# Patient Record
Sex: FEMALE | Race: WHITE | Employment: OTHER | URBAN - METROPOLITAN AREA
[De-identification: names, ages, dates, MRNs, and addresses within clinical notes are randomized per-mention and may not be internally consistent; named-entity substitution may affect disease eponyms.]

---

## 2021-04-29 ENCOUNTER — TRANSCRIBE ORDER (OUTPATIENT)
Dept: SCHEDULING | Age: 76
End: 2021-04-29

## 2021-04-29 DIAGNOSIS — R74.8 ELEVATED LIVER ENZYMES: Primary | ICD-10-CM

## 2021-05-10 ENCOUNTER — HOSPITAL ENCOUNTER (OUTPATIENT)
Dept: ULTRASOUND IMAGING | Age: 76
Discharge: HOME | End: 2021-05-10
Payer: MEDICARE

## 2021-05-10 DIAGNOSIS — R74.8 ELEVATED LIVER ENZYMES: ICD-10-CM

## 2021-05-10 PROCEDURE — 76700 US EXAM ABDOM COMPLETE: CPT

## 2021-06-07 ENCOUNTER — TRANSCRIBE ORDER (OUTPATIENT)
Dept: SCHEDULING | Age: 76
End: 2021-06-07

## 2021-06-07 DIAGNOSIS — R74.8 ACID PHOSPHATASE ELEVATED: Primary | ICD-10-CM

## 2021-06-23 ENCOUNTER — HOSPITAL ENCOUNTER (OUTPATIENT)
Dept: MRI IMAGING | Age: 76
Discharge: HOME | End: 2021-06-23
Payer: MEDICARE

## 2021-06-23 DIAGNOSIS — R74.8 ACID PHOSPHATASE ELEVATED: ICD-10-CM

## 2021-06-23 PROCEDURE — 74181 MRI ABDOMEN W/O CONTRAST: CPT

## 2021-08-16 ENCOUNTER — TRANSCRIBE ORDER (OUTPATIENT)
Dept: SCHEDULING | Age: 76
End: 2021-08-16

## 2021-08-16 DIAGNOSIS — R17 UNSPECIFIED JAUNDICE: ICD-10-CM

## 2021-08-16 DIAGNOSIS — R74.8 ELEVATED LIVER ENZYMES: Primary | ICD-10-CM

## 2021-10-21 ENCOUNTER — TRANSCRIBE ORDER (OUTPATIENT)
Dept: SCHEDULING | Age: 76
End: 2021-10-21

## 2021-10-21 DIAGNOSIS — R74.8 ELEVATED LIVER ENZYMES: Primary | ICD-10-CM

## 2021-10-21 DIAGNOSIS — R17 UNSPECIFIED JAUNDICE: ICD-10-CM

## 2021-11-02 RX ORDER — METFORMIN HYDROCHLORIDE 500 MG/1
500 TABLET ORAL
COMMUNITY
End: 2021-12-10

## 2021-11-02 RX ORDER — CANDESARTAN CILEXETIL AND HYDROCHLOROTHIAZIDE 32; 25 MG/1; MG/1
1 TABLET ORAL DAILY
COMMUNITY
End: 2021-12-10

## 2021-11-02 RX ORDER — METOPROLOL TARTRATE 100 MG/1
100 TABLET ORAL DAILY
COMMUNITY
End: 2021-12-10

## 2021-11-02 RX ORDER — HYDROXYCHLOROQUINE SULFATE 200 MG/1
200 TABLET, FILM COATED ORAL DAILY
COMMUNITY
End: 2022-10-19

## 2021-11-09 ENCOUNTER — HOSPITAL ENCOUNTER (OUTPATIENT)
Dept: INTERVENTIONAL RADIOLOGY/VASCULAR | Age: 76
Discharge: HOME | End: 2021-11-09
Payer: MEDICARE

## 2021-11-09 VITALS
WEIGHT: 130 LBS | BODY MASS INDEX: 25.52 KG/M2 | SYSTOLIC BLOOD PRESSURE: 102 MMHG | OXYGEN SATURATION: 96 % | DIASTOLIC BLOOD PRESSURE: 56 MMHG | TEMPERATURE: 97.8 F | RESPIRATION RATE: 16 BRPM | HEART RATE: 72 BPM | HEIGHT: 60 IN

## 2021-11-09 DIAGNOSIS — R79.89 ELEVATED LIVER FUNCTION TESTS: ICD-10-CM

## 2021-11-09 LAB
ANION GAP SERPL CALC-SCNC: 5 MMOL/L (ref 5–15)
BASOPHILS # BLD: 0.1 K/UL (ref 0–0.1)
BASOPHILS NFR BLD: 1 % (ref 0–1)
BUN SERPL-MCNC: 12 MG/DL (ref 6–20)
BUN/CREAT SERPL: 14 (ref 12–20)
CA-I BLD-MCNC: 8.8 MG/DL (ref 8.5–10.1)
CHLORIDE SERPL-SCNC: 106 MMOL/L (ref 97–108)
CO2 SERPL-SCNC: 25 MMOL/L (ref 21–32)
CREAT SERPL-MCNC: 0.84 MG/DL (ref 0.55–1.02)
DIFFERENTIAL METHOD BLD: ABNORMAL
EOSINOPHIL # BLD: 0.8 K/UL (ref 0–0.4)
EOSINOPHIL NFR BLD: 9 % (ref 0–7)
ERYTHROCYTE [DISTWIDTH] IN BLOOD BY AUTOMATED COUNT: 14.6 % (ref 11.5–14.5)
GLUCOSE BLD STRIP.AUTO-MCNC: 99 MG/DL (ref 65–117)
GLUCOSE SERPL-MCNC: 122 MG/DL (ref 65–100)
HCT VFR BLD AUTO: 37.5 % (ref 35–47)
HGB BLD-MCNC: 12.9 G/DL (ref 11.5–16)
IMM GRANULOCYTES # BLD AUTO: 0 K/UL (ref 0–0.04)
IMM GRANULOCYTES NFR BLD AUTO: 0 % (ref 0–0.5)
INR PPP: 1.4 (ref 0.9–1.1)
LYMPHOCYTES # BLD: 1.8 K/UL (ref 0.8–3.5)
LYMPHOCYTES NFR BLD: 20 % (ref 12–49)
MCH RBC QN AUTO: 32.9 PG (ref 26–34)
MCHC RBC AUTO-ENTMCNC: 34.4 G/DL (ref 30–36.5)
MCV RBC AUTO: 95.7 FL (ref 80–99)
MONOCYTES # BLD: 1.1 K/UL (ref 0–1)
MONOCYTES NFR BLD: 12 % (ref 5–13)
NEUTS SEG # BLD: 5.4 K/UL (ref 1.8–8)
NEUTS SEG NFR BLD: 58 % (ref 32–75)
NRBC # BLD: 0 K/UL (ref 0–0.01)
NRBC BLD-RTO: 0 PER 100 WBC
PERFORMED BY, TECHID: NORMAL
PLATELET # BLD AUTO: 210 K/UL (ref 150–400)
PMV BLD AUTO: 11 FL (ref 8.9–12.9)
POTASSIUM SERPL-SCNC: 3.9 MMOL/L (ref 3.5–5.1)
PROTHROMBIN TIME: 16.6 SEC (ref 11.9–14.7)
RBC # BLD AUTO: 3.92 M/UL (ref 3.8–5.2)
SODIUM SERPL-SCNC: 136 MMOL/L (ref 136–145)
WBC # BLD AUTO: 9.2 K/UL (ref 3.6–11)

## 2021-11-09 PROCEDURE — 88307 TISSUE EXAM BY PATHOLOGIST: CPT

## 2021-11-09 PROCEDURE — 99152 MOD SED SAME PHYS/QHP 5/>YRS: CPT

## 2021-11-09 PROCEDURE — 82962 GLUCOSE BLOOD TEST: CPT

## 2021-11-09 PROCEDURE — 80048 BASIC METABOLIC PNL TOTAL CA: CPT

## 2021-11-09 PROCEDURE — 74011250636 HC RX REV CODE- 250/636: Performed by: RADIOLOGY

## 2021-11-09 PROCEDURE — 36415 COLL VENOUS BLD VENIPUNCTURE: CPT

## 2021-11-09 PROCEDURE — 77030003503 IR BX LIVER PERCUTANEOUS

## 2021-11-09 PROCEDURE — 88333 PATH CONSLTJ SURG CYTO XM 1: CPT

## 2021-11-09 PROCEDURE — 85025 COMPLETE CBC W/AUTO DIFF WBC: CPT

## 2021-11-09 PROCEDURE — 85610 PROTHROMBIN TIME: CPT

## 2021-11-09 PROCEDURE — 88313 SPECIAL STAINS GROUP 2: CPT

## 2021-11-09 RX ORDER — FENTANYL CITRATE 50 UG/ML
12.5-5 INJECTION, SOLUTION INTRAMUSCULAR; INTRAVENOUS
Status: DISCONTINUED | OUTPATIENT
Start: 2021-11-09 | End: 2021-11-18 | Stop reason: HOSPADM

## 2021-11-09 RX ORDER — MIDAZOLAM HYDROCHLORIDE 1 MG/ML
.25-2 INJECTION, SOLUTION INTRAMUSCULAR; INTRAVENOUS
Status: DISCONTINUED | OUTPATIENT
Start: 2021-11-09 | End: 2021-11-18 | Stop reason: HOSPADM

## 2021-11-09 RX ADMIN — FENTANYL CITRATE 50 MCG: 0.05 INJECTION, SOLUTION INTRAMUSCULAR; INTRAVENOUS at 09:49

## 2021-11-09 RX ADMIN — MIDAZOLAM HYDROCHLORIDE 1 MG: 1 INJECTION, SOLUTION INTRAMUSCULAR; INTRAVENOUS at 09:49

## 2021-12-07 ENCOUNTER — HOSPITAL ENCOUNTER (INPATIENT)
Age: 76
LOS: 3 days | Discharge: HOME | DRG: 381 | End: 2021-12-10
Attending: EMERGENCY MEDICINE | Admitting: INTERNAL MEDICINE
Payer: MEDICARE

## 2021-12-07 ENCOUNTER — APPOINTMENT (OUTPATIENT)
Dept: GENERAL RADIOLOGY | Age: 76
DRG: 381 | End: 2021-12-07
Attending: EMERGENCY MEDICINE
Payer: MEDICARE

## 2021-12-07 DIAGNOSIS — K92.0 GASTROINTESTINAL HEMORRHAGE WITH HEMATEMESIS: Primary | ICD-10-CM

## 2021-12-07 PROBLEM — K92.2 GI BLEED: Status: ACTIVE | Noted: 2021-01-01

## 2021-12-07 LAB
ALBUMIN SERPL-MCNC: 1.9 G/DL (ref 3.5–5)
ALBUMIN/GLOB SERPL: 0.3 {RATIO} (ref 1.1–2.2)
ALP SERPL-CCNC: 183 U/L (ref 45–117)
ALT SERPL-CCNC: 345 U/L (ref 12–78)
ANION GAP SERPL CALC-SCNC: 12 MMOL/L (ref 5–15)
APPEARANCE UR: ABNORMAL
AST SERPL W P-5'-P-CCNC: 610 U/L (ref 15–37)
BACTERIA URNS QL MICRO: ABNORMAL /HPF
BASOPHILS # BLD: 0.1 K/UL (ref 0–0.1)
BASOPHILS NFR BLD: 1 % (ref 0–1)
BILIRUB SERPL-MCNC: 3.9 MG/DL (ref 0.2–1)
BILIRUB UR QL: NEGATIVE
BUN SERPL-MCNC: 43 MG/DL (ref 6–20)
BUN/CREAT SERPL: 38 (ref 12–20)
CA-I BLD-MCNC: 9.6 MG/DL (ref 8.5–10.1)
CHLORIDE SERPL-SCNC: 97 MMOL/L (ref 97–108)
CO2 SERPL-SCNC: 20 MMOL/L (ref 21–32)
COLOR UR: YELLOW
CREAT SERPL-MCNC: 1.14 MG/DL (ref 0.55–1.02)
DIFFERENTIAL METHOD BLD: ABNORMAL
EOSINOPHIL # BLD: 0 K/UL (ref 0–0.4)
EOSINOPHIL NFR BLD: 0 % (ref 0–7)
ERYTHROCYTE [DISTWIDTH] IN BLOOD BY AUTOMATED COUNT: 15.8 % (ref 11.5–14.5)
GLOBULIN SER CALC-MCNC: 6.8 G/DL (ref 2–4)
GLUCOSE SERPL-MCNC: 159 MG/DL (ref 65–100)
GLUCOSE UR STRIP.AUTO-MCNC: NEGATIVE MG/DL
HCT VFR BLD AUTO: 26.7 % (ref 35–47)
HCT VFR BLD AUTO: 30.5 % (ref 35–47)
HEMOCCULT SP1 STL QL: POSITIVE
HGB BLD-MCNC: 10 G/DL (ref 11.5–16)
HGB BLD-MCNC: 8.8 G/DL (ref 11.5–16)
HGB UR QL STRIP: ABNORMAL
IMM GRANULOCYTES # BLD AUTO: 0.2 K/UL (ref 0–0.04)
IMM GRANULOCYTES NFR BLD AUTO: 1 % (ref 0–0.5)
KETONES UR QL STRIP.AUTO: 5 MG/DL
LEUKOCYTE ESTERASE UR QL STRIP.AUTO: ABNORMAL
LYMPHOCYTES # BLD: 2 K/UL (ref 0.8–3.5)
LYMPHOCYTES NFR BLD: 11 % (ref 12–49)
MCH RBC QN AUTO: 33.1 PG (ref 26–34)
MCHC RBC AUTO-ENTMCNC: 32.8 G/DL (ref 30–36.5)
MCV RBC AUTO: 101 FL (ref 80–99)
MONOCYTES # BLD: 2 K/UL (ref 0–1)
MONOCYTES NFR BLD: 11 % (ref 5–13)
MUCOUS THREADS URNS QL MICRO: ABNORMAL /LPF
NEUTS SEG # BLD: 14 K/UL (ref 1.8–8)
NEUTS SEG NFR BLD: 76 % (ref 32–75)
NITRITE UR QL STRIP.AUTO: NEGATIVE
NRBC # BLD: 0.02 K/UL (ref 0–0.01)
NRBC BLD-RTO: 0.1 PER 100 WBC
PH UR STRIP: 5 [PH] (ref 5–8)
PLATELET # BLD AUTO: 208 K/UL (ref 150–400)
PMV BLD AUTO: 12.1 FL (ref 8.9–12.9)
POTASSIUM SERPL-SCNC: 4.9 MMOL/L (ref 3.5–5.1)
PROT SERPL-MCNC: 8.7 G/DL (ref 6.4–8.2)
PROT UR STRIP-MCNC: NEGATIVE MG/DL
RBC # BLD AUTO: 3.02 M/UL (ref 3.8–5.2)
RBC #/AREA URNS HPF: ABNORMAL /HPF (ref 0–5)
SODIUM SERPL-SCNC: 129 MMOL/L (ref 136–145)
SP GR UR REFRACTOMETRY: 1.02 (ref 1–1.03)
TROPONIN-HIGH SENSITIVITY: 45 NG/L (ref 0–51)
UROBILINOGEN UR QL STRIP.AUTO: 2 EU/DL (ref 0.1–1)
WBC # BLD AUTO: 18.3 K/UL (ref 3.6–11)
WBC URNS QL MICRO: ABNORMAL /HPF (ref 0–4)

## 2021-12-07 PROCEDURE — 99285 EMERGENCY DEPT VISIT HI MDM: CPT

## 2021-12-07 PROCEDURE — 85018 HEMOGLOBIN: CPT

## 2021-12-07 PROCEDURE — 96374 THER/PROPH/DIAG INJ IV PUSH: CPT

## 2021-12-07 PROCEDURE — 86901 BLOOD TYPING SEROLOGIC RH(D): CPT

## 2021-12-07 PROCEDURE — 84484 ASSAY OF TROPONIN QUANT: CPT

## 2021-12-07 PROCEDURE — 74011250636 HC RX REV CODE- 250/636: Performed by: EMERGENCY MEDICINE

## 2021-12-07 PROCEDURE — 94761 N-INVAS EAR/PLS OXIMETRY MLT: CPT

## 2021-12-07 PROCEDURE — 85025 COMPLETE CBC W/AUTO DIFF WBC: CPT

## 2021-12-07 PROCEDURE — 82272 OCCULT BLD FECES 1-3 TESTS: CPT

## 2021-12-07 PROCEDURE — 36415 COLL VENOUS BLD VENIPUNCTURE: CPT

## 2021-12-07 PROCEDURE — 71046 X-RAY EXAM CHEST 2 VIEWS: CPT

## 2021-12-07 PROCEDURE — 93005 ELECTROCARDIOGRAM TRACING: CPT

## 2021-12-07 PROCEDURE — 96375 TX/PRO/DX INJ NEW DRUG ADDON: CPT

## 2021-12-07 PROCEDURE — 65610000006 HC RM INTENSIVE CARE

## 2021-12-07 PROCEDURE — 80053 COMPREHEN METABOLIC PANEL: CPT

## 2021-12-07 PROCEDURE — 81001 URINALYSIS AUTO W/SCOPE: CPT

## 2021-12-07 PROCEDURE — 86923 COMPATIBILITY TEST ELECTRIC: CPT

## 2021-12-07 PROCEDURE — C9113 INJ PANTOPRAZOLE SODIUM, VIA: HCPCS | Performed by: EMERGENCY MEDICINE

## 2021-12-07 PROCEDURE — 74011000250 HC RX REV CODE- 250: Performed by: EMERGENCY MEDICINE

## 2021-12-07 RX ORDER — SODIUM CHLORIDE 0.9 % (FLUSH) 0.9 %
5-40 SYRINGE (ML) INJECTION EVERY 8 HOURS
Status: DISCONTINUED | OUTPATIENT
Start: 2021-12-07 | End: 2021-12-09

## 2021-12-07 RX ORDER — ACETAMINOPHEN 325 MG/1
650 TABLET ORAL
Status: DISCONTINUED | OUTPATIENT
Start: 2021-12-07 | End: 2021-12-10 | Stop reason: HOSPADM

## 2021-12-07 RX ORDER — SODIUM CHLORIDE 0.9 % (FLUSH) 0.9 %
5-40 SYRINGE (ML) INJECTION AS NEEDED
Status: DISCONTINUED | OUTPATIENT
Start: 2021-12-07 | End: 2021-12-10 | Stop reason: HOSPADM

## 2021-12-07 RX ORDER — HYDROXYCHLOROQUINE SULFATE 200 MG/1
200 TABLET, FILM COATED ORAL
Status: DISCONTINUED | OUTPATIENT
Start: 2021-12-08 | End: 2021-12-10 | Stop reason: HOSPADM

## 2021-12-07 RX ORDER — SODIUM CHLORIDE 0.9 % (FLUSH) 0.9 %
5-40 SYRINGE (ML) INJECTION AS NEEDED
Status: DISCONTINUED | OUTPATIENT
Start: 2021-12-07 | End: 2021-12-08

## 2021-12-07 RX ORDER — BUDESONIDE 3 MG/1
6 CAPSULE, COATED PELLETS ORAL
COMMUNITY
End: 2021-12-10

## 2021-12-07 RX ORDER — ACETAMINOPHEN 650 MG/1
650 SUPPOSITORY RECTAL
Status: DISCONTINUED | OUTPATIENT
Start: 2021-12-07 | End: 2021-12-10 | Stop reason: HOSPADM

## 2021-12-07 RX ORDER — SODIUM CHLORIDE 9 MG/ML
75 INJECTION, SOLUTION INTRAVENOUS AS NEEDED
Status: DISCONTINUED | OUTPATIENT
Start: 2021-12-07 | End: 2021-12-10 | Stop reason: HOSPADM

## 2021-12-07 RX ORDER — SODIUM CHLORIDE 9 MG/ML
250 INJECTION, SOLUTION INTRAVENOUS AS NEEDED
Status: DISCONTINUED | OUTPATIENT
Start: 2021-12-07 | End: 2021-12-10 | Stop reason: HOSPADM

## 2021-12-07 RX ORDER — LANOLIN ALCOHOL/MO/W.PET/CERES
3 CREAM (GRAM) TOPICAL
Status: DISCONTINUED | OUTPATIENT
Start: 2021-12-07 | End: 2021-12-10 | Stop reason: HOSPADM

## 2021-12-07 RX ORDER — ONDANSETRON 2 MG/ML
4 INJECTION INTRAMUSCULAR; INTRAVENOUS
Status: DISCONTINUED | OUTPATIENT
Start: 2021-12-07 | End: 2021-12-10 | Stop reason: HOSPADM

## 2021-12-07 RX ORDER — POLYETHYLENE GLYCOL 3350 17 G/17G
17 POWDER, FOR SOLUTION ORAL DAILY PRN
Status: DISCONTINUED | OUTPATIENT
Start: 2021-12-07 | End: 2021-12-10 | Stop reason: HOSPADM

## 2021-12-07 RX ORDER — SODIUM CHLORIDE 0.9 % (FLUSH) 0.9 %
5-40 SYRINGE (ML) INJECTION EVERY 8 HOURS
Status: DISCONTINUED | OUTPATIENT
Start: 2021-12-07 | End: 2021-12-08

## 2021-12-07 RX ORDER — ONDANSETRON 4 MG/1
4 TABLET, ORALLY DISINTEGRATING ORAL
Status: DISCONTINUED | OUTPATIENT
Start: 2021-12-07 | End: 2021-12-10 | Stop reason: HOSPADM

## 2021-12-07 RX ADMIN — SODIUM CHLORIDE 40 MG: 9 INJECTION, SOLUTION INTRAMUSCULAR; INTRAVENOUS; SUBCUTANEOUS at 19:53

## 2021-12-07 RX ADMIN — CEFTRIAXONE 1 G: 1 INJECTION, POWDER, FOR SOLUTION INTRAMUSCULAR; INTRAVENOUS at 19:53

## 2021-12-07 NOTE — ED TRIAGE NOTES
Pt recently started taken budesonide , 2 days ago developed right lower quad abd pain , bark stool and dark emesis, also states her urine is dark

## 2021-12-07 NOTE — Clinical Note
Status[de-identified] INPATIENT [101]   Type of Bed: Medical [8]   Inpatient Hospitalization Certified Necessary for the Following Reasons: 9.  Other (further clarification in H&P documentation)   Admitting Diagnosis: GI bleed [066416]   Admitting Physician: Sabrina Valerio   Attending Physician: Sabrina Valerio   Estimated Length of Stay: 2 Midnights   Discharge Plan[de-identified] Home with Office Follow-up

## 2021-12-07 NOTE — ED PROVIDER NOTES
EMERGENCY DEPARTMENT HISTORY AND PHYSICAL EXAM      Date: 12/7/2021  Patient Name: Mary Horton    History of Presenting Illness     Chief Complaint   Patient presents with    GI Problem       History Provided By: Patient    HPI: Mary Horton, 77 y.o. female with a past medical history significant diabetes and Lupus presents to the ED with cc of having black stools for the past couple of days and this morning vomited some black material that she is concerned might be blood. She denies being on a blood thinning medication or any recent trauma. She has no history of colon cancer at this point. She denies any fever, chills, nausea, vomiting otherwise, chest pain, shortness of breath, rash, diarrhea, headache, night sweats. There are no other complaints, changes, or physical findings at this time. PCP: Teo Cobos NP    No current facility-administered medications on file prior to encounter. Current Outpatient Medications on File Prior to Encounter   Medication Sig Dispense Refill    budesonide (ENTOCORT EC) 3 mg capsule Take 6 mg by mouth every morning.  metFORMIN (GLUCOPHAGE) 500 mg tablet Take 500 mg by mouth daily (with breakfast).  metoprolol tartrate (LOPRESSOR) 100 mg IR tablet Take 100 mg by mouth daily.  hydrOXYchloroQUINE (PlaqueniL) 200 mg tablet Take 200 mg by mouth daily.  candesartan-hydroCHLOROthiazide (ATACAND HCT) 32-25 mg tab per tablet Take 1 Tablet by mouth daily.          Past History     Past Medical History:  Past Medical History:   Diagnosis Date    Diabetes ([address]     Elevated liver enzymes     Hypertension     Lupus ([facility] [address]        Past Surgical History:  Past Surgical History:   Procedure Laterality Date    HX CARPAL TUNNEL RELEASE      HX ORTHOPAEDIC      back     HX OTHER SURGICAL      gallbladder removal    IR BX LIVER PERCUTANEOUS  11/9/2021    [place] BREAST SURGERY PROCEDURE UNLISTED Left     lumpectomy       Family History:  Family History   Problem Relation Age of Onset    Cancer Mother         breast    Lupus Mother     No Known Problems Father        Social History:  Social History     Tobacco Use    Smoking status: Never Smoker    Smokeless tobacco: Never Used   Vaping Use    Vaping Use: Never used   Substance Use Topics    Alcohol use: Yes     Comment: a glass of wine every 6 months    Drug use: Never       Allergies: Allergies   Allergen Reactions    Pcn [Penicillins] Rash         Review of Systems     Review of Systems   Constitutional: Negative. Negative for appetite change, chills, fatigue and fever. HENT: Negative. Negative for congestion and sinus pain. Eyes: Negative. Negative for pain and visual disturbance. Respiratory: Negative. Negative for chest tightness and shortness of breath. Cardiovascular: Negative. Negative for chest pain. Gastrointestinal: Positive for nausea and vomiting. Negative for abdominal pain and diarrhea. Black stools and black in her vomitus   Genitourinary: Negative. Negative for difficulty urinating. No discharge   Musculoskeletal: Negative. Negative for arthralgias. Skin: Negative. Negative for rash. Neurological: Negative. Negative for weakness and headaches. Hematological: Negative. Psychiatric/Behavioral: Negative. Negative for agitation. The patient is not nervous/anxious. All other systems reviewed and are negative. Physical Exam     Physical Exam  Vitals and nursing note reviewed. Constitutional:       General: She is not in acute distress. Appearance: She is well-developed. HENT:      Head: Normocephalic and atraumatic. Nose: Nose normal.      Mouth/Throat:      Mouth: Mucous membranes are moist.      Pharynx: Oropharynx is clear. No oropharyngeal exudate. Eyes:      General:         Right eye: No discharge. Left eye: No discharge.       Conjunctiva/sclera: Conjunctivae normal.      Pupils: Pupils are equal, round, and reactive to light. Cardiovascular:      Rate and Rhythm: Regular rhythm. Tachycardia present. Chest Wall: PMI is not displaced. No thrill. Heart sounds: Normal heart sounds. No murmur heard. No friction rub. No gallop. Pulmonary:      Effort: Pulmonary effort is normal. No respiratory distress. Breath sounds: Normal breath sounds. No wheezing or rales. Chest:      Chest wall: No tenderness. Abdominal:      General: Bowel sounds are normal. There is no distension. Palpations: Abdomen is soft. There is no mass. Tenderness: There is no abdominal tenderness. There is no guarding or rebound. Musculoskeletal:         General: Normal range of motion. Cervical back: Normal range of motion and neck supple. Lymphadenopathy:      Cervical: No cervical adenopathy. Skin:     General: Skin is warm and dry. Capillary Refill: Capillary refill takes less than 2 seconds. Findings: No erythema or rash. Neurological:      Mental Status: She is alert and oriented to person, place, and time. Cranial Nerves: No cranial nerve deficit.       Coordination: Coordination normal.   Psychiatric:         Mood and Affect: Mood normal.         Behavior: Behavior normal.         Lab and Diagnostic Study Results     Labs -     Recent Results (from the past 12 hour(s))   CBC WITH AUTOMATED DIFF    Collection Time: 12/07/21  1:15 PM   Result Value Ref Range    WBC 18.3 (H) 3.6 - 11.0 K/uL    RBC 3.02 (L) 3.80 - 5.20 M/uL    HGB 10.0 (L) 11.5 - 16.0 g/dL    HCT 30.5 (L) 35.0 - 47.0 %    .0 (H) 80.0 - 99.0 FL    MCH 33.1 26.0 - 34.0 PG    MCHC 32.8 30.0 - 36.5 g/dL    RDW 15.8 (H) 11.5 - 14.5 %    PLATELET 289 779 - 923 K/uL    MPV 12.1 8.9 - 12.9 FL    NRBC 0.1 (H) 0.0  WBC    ABSOLUTE NRBC 0.02 (H) 0.00 - 0.01 K/uL    NEUTROPHILS 76 (H) 32 - 75 %    LYMPHOCYTES 11 (L) 12 - 49 %    MONOCYTES 11 5 - 13 %    EOSINOPHILS 0 0 - 7 %    BASOPHILS 1 0 - 1 % IMMATURE GRANULOCYTES 1 (H) 0 - 0.5 %    ABS. NEUTROPHILS 14.0 (H) 1.8 - 8.0 K/UL    ABS. LYMPHOCYTES 2.0 0.8 - 3.5 K/UL    ABS. MONOCYTES 2.0 (H) 0.0 - 1.0 K/UL    ABS. EOSINOPHILS 0.0 0.0 - 0.4 K/UL    ABS. BASOPHILS 0.1 0.0 - 0.1 K/UL    ABS. IMM. GRANS. 0.2 (H) 0.00 - 0.04 K/UL    DF AUTOMATED     METABOLIC PANEL, COMPREHENSIVE    Collection Time: 12/07/21  1:15 PM   Result Value Ref Range    Sodium 129 (L) 136 - 145 mmol/L    Potassium 4.9 3.5 - 5.1 mmol/L    Chloride 97 97 - 108 mmol/L    CO2 20 (L) 21 - 32 mmol/L    Anion gap 12 5 - 15 mmol/L    Glucose 159 (H) 65 - 100 mg/dL    BUN 43 (H) 6 - 20 mg/dL    Creatinine 1.14 (H) 0.55 - 1.02 mg/dL    BUN/Creatinine ratio 38 (H) 12 - 20      GFR est AA 56 (L) >60 ml/min/1.73m2    GFR est non-AA 46 (L) >60 ml/min/1.73m2    Calcium 9.6 8.5 - 10.1 mg/dL    Bilirubin, total 3.9 (H) 0.2 - 1.0 mg/dL    AST (SGOT) 610 (H) 15 - 37 U/L    ALT (SGPT) 345 (H) 12 - 78 U/L    Alk.  phosphatase 183 (H) 45 - 117 U/L    Protein, total 8.7 (H) 6.4 - 8.2 g/dL    Albumin 1.9 (L) 3.5 - 5.0 g/dL    Globulin 6.8 (H) 2.0 - 4.0 g/dL    A-G Ratio 0.3 (L) 1.1 - 2.2     TYPE & SCREEN    Collection Time: 12/07/21  1:25 PM   Result Value Ref Range    Crossmatch Expiration 12/10/2021,2359     ABO/Rh(D) A Positive     Antibody screen Negative    TROPONIN-HIGH SENSITIVITY    Collection Time: 12/07/21  1:25 PM   Result Value Ref Range    Troponin-High Sensitivity 45 0 - 51 ng/L   OCCULT BLOOD, STOOL    Collection Time: 12/07/21  5:55 PM   Result Value Ref Range    Occult Blood,day 1 Positive (A) Negative     URINALYSIS W/MICROSCOPIC    Collection Time: 12/07/21  6:02 PM   Result Value Ref Range    Color Yellow      Appearance Turbid (A) Clear      Specific gravity 1.020 1.003 - 1.030      pH (UA) 5.0 5.0 - 8.0      Protein Negative Negative mg/dL    Glucose Negative Negative mg/dL    Ketone 5 (A) Negative mg/dL    Bilirubin Negative Negative      Blood Small (A) Negative      Urobilinogen 2.0 (H) 0.1 - 1.0 EU/dL    Nitrites Negative Negative      Leukocyte Esterase Small (A) Negative      WBC 0-5 0 - 4 /hpf    RBC 0-5 0 - 5 /hpf    Bacteria 1+ (A) Negative /hpf    Mucus Trace /lpf       Radiologic Studies -   @lastxrresult@  CT Results  (Last 48 hours)    None        CXR Results  (Last 48 hours)               12/07/21 1335  XR CHEST PA LAT Final result    Impression:  No acute cardiopulmonary abnormality. Narrative:  Clinical history: Shortness of breath       Comparison: None. Findings:    2 view chest. The lungs are adequately expanded and clear. No pleural effusion   or pneumothorax. The cardiac silhouette is normal in size. No pulmonary edema. The osseous structures are intact. Cholecystectomy clips noted. Medical Decision Making   - I am the first provider for this patient. - I reviewed the vital signs, available nursing notes, past medical history, past surgical history, family history and social history. - Initial assessment performed. The patients presenting problems have been discussed, and they are in agreement with the care plan formulated and outlined with them. I have encouraged them to ask questions as they arise throughout their visit. Vital Signs-Reviewed the patient's vital signs. Patient Vitals for the past 12 hrs:   Temp Pulse Resp BP SpO2   12/07/21 1909 -- 91 15 109/68 100 %   12/07/21 1729 -- 94 21 109/63 100 %   12/07/21 1315 -- -- -- -- 99 %   12/07/21 1311 98.4 °F (36.9 °C) (!) 122 18 133/75 99 %       Records Reviewed: Nursing Notes    The patient presents with dark stool with a differential diagnosis of GI bleed upper versus lower. She denies any other symptoms at this time. We will proceed with work-up as well as occult blood testing.       ED Course:          Provider Notes (Medical Decision Making):   78-year-old female dropped her hemoglobin 3.7 less than 30 days now presents with black tarry stools that is heme positive as well as vomiting of blood. Patient is hemodynamically stable with the exception of the fact that she has a heart rate of in the 120s. Will be admitting her given this and her other comorbid status which make her increased risk for morbidity and mortality. I discussed the case with Dr. Apoorva Barone. I reached out to GI for consultation. MDM       Procedures   Medical Decision Makingedical Decision Making  Performed by: Mansi Nesbitt MD  PROCEDURES:  Procedures       Disposition   Disposition: Condition stable    Admitted    DISCHARGE PLAN:  1. Current Discharge Medication List      CONTINUE these medications which have NOT CHANGED    Details   budesonide (ENTOCORT EC) 3 mg capsule Take 6 mg by mouth every morning. metFORMIN (GLUCOPHAGE) 500 mg tablet Take 500 mg by mouth daily (with breakfast). metoprolol tartrate (LOPRESSOR) 100 mg IR tablet Take 100 mg by mouth daily. hydrOXYchloroQUINE (PlaqueniL) 200 mg tablet Take 200 mg by mouth daily. candesartan-hydroCHLOROthiazide (ATACAND HCT) 32-25 mg tab per tablet Take 1 Tablet by mouth daily. 2.   Follow-up Information    None       3. Return to ED if worse   4. Current Discharge Medication List            Diagnosis     Clinical Impression:   1. Gastrointestinal hemorrhage with hematemesis        Attestations:    Mansi Nesbitt MD    Please note that this dictation was completed with thinkingphones, the computer voice recognition software. Quite often unanticipated grammatical, syntax, homophones, and other interpretive errors are inadvertently transcribed by the computer software. Please disregard these errors. Please excuse any errors that have escaped final proofreading. Thank you.

## 2021-12-08 ENCOUNTER — ANESTHESIA (OUTPATIENT)
Dept: ENDOSCOPY | Age: 76
DRG: 381 | End: 2021-12-08
Payer: MEDICARE

## 2021-12-08 ENCOUNTER — APPOINTMENT (OUTPATIENT)
Dept: ENDOSCOPY | Age: 76
DRG: 381 | End: 2021-12-08
Attending: INTERNAL MEDICINE
Payer: MEDICARE

## 2021-12-08 ENCOUNTER — ANESTHESIA EVENT (OUTPATIENT)
Dept: ENDOSCOPY | Age: 76
DRG: 381 | End: 2021-12-08
Payer: MEDICARE

## 2021-12-08 LAB
ALBUMIN SERPL-MCNC: 1.6 G/DL (ref 3.5–5)
ALBUMIN/GLOB SERPL: 0.3 {RATIO} (ref 1.1–2.2)
ALP SERPL-CCNC: 138 U/L (ref 45–117)
ALT SERPL-CCNC: 328 U/L (ref 12–78)
ANION GAP SERPL CALC-SCNC: 6 MMOL/L (ref 5–15)
AST SERPL W P-5'-P-CCNC: 550 U/L (ref 15–37)
BILIRUB SERPL-MCNC: 2.6 MG/DL (ref 0.2–1)
BUN SERPL-MCNC: 41 MG/DL (ref 6–20)
BUN/CREAT SERPL: 48 (ref 12–20)
CA-I BLD-MCNC: 8.7 MG/DL (ref 8.5–10.1)
CHLORIDE SERPL-SCNC: 99 MMOL/L (ref 97–108)
CO2 SERPL-SCNC: 27 MMOL/L (ref 21–32)
CREAT SERPL-MCNC: 0.86 MG/DL (ref 0.55–1.02)
GLOBULIN SER CALC-MCNC: 5.4 G/DL (ref 2–4)
GLUCOSE BLD STRIP.AUTO-MCNC: 145 MG/DL (ref 65–117)
GLUCOSE BLD STRIP.AUTO-MCNC: 176 MG/DL (ref 65–117)
GLUCOSE SERPL-MCNC: 151 MG/DL (ref 65–100)
HCT VFR BLD AUTO: 21.9 % (ref 35–47)
HCT VFR BLD AUTO: 29.4 % (ref 35–47)
HCT VFR BLD AUTO: 36.3 % (ref 35–47)
HGB BLD-MCNC: 12.3 G/DL (ref 11.5–16)
HGB BLD-MCNC: 7.3 G/DL (ref 11.5–16)
HGB BLD-MCNC: 9.7 G/DL (ref 11.5–16)
INR PPP: 1.8 (ref 0.9–1.1)
MRSA DNA SPEC QL NAA+PROBE: NOT DETECTED
PERFORMED BY, TECHID: ABNORMAL
PERFORMED BY, TECHID: ABNORMAL
POTASSIUM SERPL-SCNC: 4.3 MMOL/L (ref 3.5–5.1)
PROT SERPL-MCNC: 7 G/DL (ref 6.4–8.2)
PROTHROMBIN TIME: 21 SEC (ref 11.9–14.7)
SODIUM SERPL-SCNC: 132 MMOL/L (ref 136–145)

## 2021-12-08 PROCEDURE — 80053 COMPREHEN METABOLIC PANEL: CPT

## 2021-12-08 PROCEDURE — 30233N1 TRANSFUSION OF NONAUTOLOGOUS RED BLOOD CELLS INTO PERIPHERAL VEIN, PERCUTANEOUS APPROACH: ICD-10-PCS | Performed by: INTERNAL MEDICINE

## 2021-12-08 PROCEDURE — 2709999900 HC NON-CHARGEABLE SUPPLY: Performed by: INTERNAL MEDICINE

## 2021-12-08 PROCEDURE — 74011250636 HC RX REV CODE- 250/636: Performed by: NURSE ANESTHETIST, CERTIFIED REGISTERED

## 2021-12-08 PROCEDURE — 85014 HEMATOCRIT: CPT

## 2021-12-08 PROCEDURE — 82962 GLUCOSE BLOOD TEST: CPT

## 2021-12-08 PROCEDURE — 74011250636 HC RX REV CODE- 250/636: Performed by: INTERNAL MEDICINE

## 2021-12-08 PROCEDURE — 77030021593 HC FCPS BIOP ENDOSC BSC -A: Performed by: INTERNAL MEDICINE

## 2021-12-08 PROCEDURE — 74011000250 HC RX REV CODE- 250: Performed by: INTERNAL MEDICINE

## 2021-12-08 PROCEDURE — 76040000019: Performed by: INTERNAL MEDICINE

## 2021-12-08 PROCEDURE — 0DB58ZX EXCISION OF ESOPHAGUS, VIA NATURAL OR ARTIFICIAL OPENING ENDOSCOPIC, DIAGNOSTIC: ICD-10-PCS | Performed by: INTERNAL MEDICINE

## 2021-12-08 PROCEDURE — P9016 RBC LEUKOCYTES REDUCED: HCPCS

## 2021-12-08 PROCEDURE — 74011000250 HC RX REV CODE- 250: Performed by: NURSE ANESTHETIST, CERTIFIED REGISTERED

## 2021-12-08 PROCEDURE — 65610000006 HC RM INTENSIVE CARE

## 2021-12-08 PROCEDURE — 36430 TRANSFUSION BLD/BLD COMPNT: CPT

## 2021-12-08 PROCEDURE — 85610 PROTHROMBIN TIME: CPT

## 2021-12-08 PROCEDURE — 88305 TISSUE EXAM BY PATHOLOGIST: CPT

## 2021-12-08 PROCEDURE — C9113 INJ PANTOPRAZOLE SODIUM, VIA: HCPCS | Performed by: INTERNAL MEDICINE

## 2021-12-08 PROCEDURE — 87641 MR-STAPH DNA AMP PROBE: CPT

## 2021-12-08 PROCEDURE — 76060000031 HC ANESTHESIA FIRST 0.5 HR: Performed by: INTERNAL MEDICINE

## 2021-12-08 RX ORDER — FENTANYL CITRATE 50 UG/ML
50 INJECTION, SOLUTION INTRAMUSCULAR; INTRAVENOUS
Status: DISCONTINUED | OUTPATIENT
Start: 2021-12-08 | End: 2021-12-08

## 2021-12-08 RX ORDER — PHYTONADIONE 10 MG/ML
10 INJECTION, EMULSION INTRAMUSCULAR; INTRAVENOUS; SUBCUTANEOUS ONCE
Status: COMPLETED | OUTPATIENT
Start: 2021-12-08 | End: 2021-12-08

## 2021-12-08 RX ORDER — SODIUM CHLORIDE 0.9 % (FLUSH) 0.9 %
5-40 SYRINGE (ML) INJECTION EVERY 8 HOURS
Status: DISCONTINUED | OUTPATIENT
Start: 2021-12-08 | End: 2021-12-08

## 2021-12-08 RX ORDER — SODIUM CHLORIDE 0.9 % (FLUSH) 0.9 %
5-40 SYRINGE (ML) INJECTION AS NEEDED
Status: DISCONTINUED | OUTPATIENT
Start: 2021-12-08 | End: 2021-12-08

## 2021-12-08 RX ORDER — DIPHENHYDRAMINE HYDROCHLORIDE 50 MG/ML
12.5 INJECTION, SOLUTION INTRAMUSCULAR; INTRAVENOUS AS NEEDED
Status: DISCONTINUED | OUTPATIENT
Start: 2021-12-08 | End: 2021-12-08

## 2021-12-08 RX ORDER — PROPOFOL 10 MG/ML
INJECTION, EMULSION INTRAVENOUS AS NEEDED
Status: DISCONTINUED | OUTPATIENT
Start: 2021-12-08 | End: 2021-12-08 | Stop reason: HOSPADM

## 2021-12-08 RX ORDER — MIDAZOLAM HYDROCHLORIDE 1 MG/ML
1 INJECTION, SOLUTION INTRAMUSCULAR; INTRAVENOUS AS NEEDED
Status: DISCONTINUED | OUTPATIENT
Start: 2021-12-08 | End: 2021-12-08

## 2021-12-08 RX ORDER — MIDAZOLAM HYDROCHLORIDE 1 MG/ML
0.5 INJECTION, SOLUTION INTRAMUSCULAR; INTRAVENOUS
Status: DISCONTINUED | OUTPATIENT
Start: 2021-12-08 | End: 2021-12-08

## 2021-12-08 RX ORDER — HYDROCODONE BITARTRATE AND ACETAMINOPHEN 5; 325 MG/1; MG/1
1 TABLET ORAL AS NEEDED
Status: DISCONTINUED | OUTPATIENT
Start: 2021-12-08 | End: 2021-12-08

## 2021-12-08 RX ORDER — LIDOCAINE HYDROCHLORIDE 10 MG/ML
0.1 INJECTION, SOLUTION EPIDURAL; INFILTRATION; INTRACAUDAL; PERINEURAL AS NEEDED
Status: DISCONTINUED | OUTPATIENT
Start: 2021-12-08 | End: 2021-12-08

## 2021-12-08 RX ORDER — LIDOCAINE HYDROCHLORIDE 20 MG/ML
INJECTION, SOLUTION EPIDURAL; INFILTRATION; INTRACAUDAL; PERINEURAL AS NEEDED
Status: DISCONTINUED | OUTPATIENT
Start: 2021-12-08 | End: 2021-12-08 | Stop reason: HOSPADM

## 2021-12-08 RX ORDER — HYDROMORPHONE HYDROCHLORIDE 1 MG/ML
0.4 INJECTION, SOLUTION INTRAMUSCULAR; INTRAVENOUS; SUBCUTANEOUS
Status: DISCONTINUED | OUTPATIENT
Start: 2021-12-08 | End: 2021-12-08

## 2021-12-08 RX ORDER — ONDANSETRON 2 MG/ML
4 INJECTION INTRAMUSCULAR; INTRAVENOUS AS NEEDED
Status: DISCONTINUED | OUTPATIENT
Start: 2021-12-08 | End: 2021-12-08

## 2021-12-08 RX ORDER — EPHEDRINE SULFATE/0.9% NACL/PF 50 MG/5 ML
5 SYRINGE (ML) INTRAVENOUS AS NEEDED
Status: DISCONTINUED | OUTPATIENT
Start: 2021-12-08 | End: 2021-12-08

## 2021-12-08 RX ORDER — FENTANYL CITRATE 50 UG/ML
50 INJECTION, SOLUTION INTRAMUSCULAR; INTRAVENOUS AS NEEDED
Status: DISCONTINUED | OUTPATIENT
Start: 2021-12-08 | End: 2021-12-08

## 2021-12-08 RX ORDER — SODIUM CHLORIDE 9 MG/ML
250 INJECTION, SOLUTION INTRAVENOUS AS NEEDED
Status: DISCONTINUED | OUTPATIENT
Start: 2021-12-08 | End: 2021-12-08

## 2021-12-08 RX ADMIN — PHYTONADIONE 10 MG: 10 INJECTION, EMULSION INTRAMUSCULAR; INTRAVENOUS; SUBCUTANEOUS at 18:09

## 2021-12-08 RX ADMIN — SODIUM CHLORIDE 40 MG: 9 INJECTION, SOLUTION INTRAMUSCULAR; INTRAVENOUS; SUBCUTANEOUS at 23:20

## 2021-12-08 RX ADMIN — LIDOCAINE HYDROCHLORIDE 100 MG: 20 INJECTION, SOLUTION EPIDURAL; INFILTRATION; INTRACAUDAL; PERINEURAL at 15:00

## 2021-12-08 RX ADMIN — SODIUM CHLORIDE 40 MG: 9 INJECTION, SOLUTION INTRAMUSCULAR; INTRAVENOUS; SUBCUTANEOUS at 09:00

## 2021-12-08 RX ADMIN — PROPOFOL 70 MG: 10 INJECTION, EMULSION INTRAVENOUS at 15:00

## 2021-12-08 RX ADMIN — CEFTRIAXONE 1 G: 1 INJECTION, POWDER, FOR SOLUTION INTRAMUSCULAR; INTRAVENOUS at 20:51

## 2021-12-08 RX ADMIN — SODIUM CHLORIDE: 9 INJECTION, SOLUTION INTRAVENOUS at 14:55

## 2021-12-08 NOTE — ANESTHESIA PREPROCEDURE EVALUATION
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

## 2021-12-08 NOTE — ROUTINE PROCESS
TRANSFER - OUT REPORT:    Verbal report given to maynor on Josemanuel Knight  being transferred to icu nurse for routine progression of care       Report consisted of patients Situation, Background, Assessment and   Recommendations(SBAR). Information from the following report(s) SBAR was reviewed with the receiving nurse. Lines:   Peripheral IV 12/08/21 Right Antecubital (Active)       Peripheral IV 12/08/21 Anterior; Left Forearm (Active)        Opportunity for questions and clarification was provided.       Patient transported with:   Registered Nurse

## 2021-12-08 NOTE — ACP (ADVANCE CARE PLANNING)
Advance Care Planning   Healthcare Decision Maker:       Primary Decision Maker: Bryce Mcclain - [facility] - [phone]

## 2021-12-08 NOTE — PROGRESS NOTES
Reason for Admission:    GIB                   RUR Score:    12%                 Plan for utilizing home health: None/uses no DME/no home health. PCP: First and Last name:  Sona Ayala NP     Name of Practice:    Are you a current patient: Yes/No: Yes    Approximate date of last visit: Seen 4 mos ago. Can you participate in a virtual visit with your PCP: Yes/Call/Has cell phone. Current Advanced Directive/Advance Care Plan: Full Code      Healthcare Decision Maker:              Primary Decision Maker: Kinjal Yu - Spouse - [phone]                  Transition of Care Plan:   D/C Plan is home with  & he will transport pt home upon discharge.

## 2021-12-08 NOTE — CONSULTS
Consult    Patient: Mel Gama MRN: [id number]  SSN: [id number]    YOB: 1945  Age: 77 y.o. Sex: female      Subjective:      Mel Gama is a 77 y.o. female who is being seen for   Hematemesis, melena    Patient was seen in emergency room,   25 room. Able to give a detailed history, reliable.         Past Medical History:   Diagnosis Date    Diabetes ([address]     Elevated liver enzymes     Hypertension     Lupus ([facility])      Past Surgical History:   Procedure Laterality Date    HX CARPAL TUNNEL RELEASE      HX ORTHOPAEDIC      back     HX OTHER SURGICAL      gallbladder removal    IR BX LIVER PERCUTANEOUS  11/9/2021    [place] BREAST SURGERY PROCEDURE UNLISTED Left     lumpectomy      Family History   Problem Relation Age of Onset    Cancer Mother         breast    Lupus Mother     No Known Problems Father      Social History     Tobacco Use    Smoking status: Never Smoker    Smokeless tobacco: Never Used   Substance Use Topics    Alcohol use: Yes     Comment: a glass of wine every 6 months      Current Facility-Administered Medications   Medication Dose Route Frequency Provider Last Rate Last Admin    0.9% sodium chloride infusion 250 mL  250 mL IntraVENous PRN Radha CORONADO MD        sodium chloride (NS) flush 5-40 mL  5-40 mL IntraVENous Q8H Raymon Carrasco MD        sodium chloride (NS) flush 5-40 mL  5-40 mL IntraVENous PRN Radha CORONADO MD        ondansetron (ZOFRAN) injection 4 mg  4 mg IntraVENous Q4H PRN Radha CORONADO MD        pantoprazole (PROTONIX) 40 mg in 0.9% sodium chloride 10 mL injection  40 mg IntraVENous Q12H Radha CORONADO MD   40 mg at 12/08/21 0900    melatonin tablet 3 mg  3 mg Oral QHS PRN Radha CORONADO MD        0.9% sodium chloride infusion  75 mL/hr IntraVENous PRN Precious Vanegas MD   New Bag at 12/08/21 1455    cefTRIAXone (ROCEPHIN) 1 g in sterile water (preservative free) 10 mL IV syringe  1 g IntraVENous Q24H Parminder Bella MD        sodium chloride (NS) flush 5-40 mL  5-40 mL IntraVENous Q8H Kvng CORONADO MD        sodium chloride (NS) flush 5-40 mL  5-40 mL IntraVENous PRN Kvng CORONADO MD        acetaminophen (TYLENOL) tablet 650 mg  650 mg Oral Q6H PRN Parminder Bella MD        Or   [facility] acetaminophen (TYLENOL) suppository 650 mg  650 mg Rectal Q6H PRN Parminder Bella MD        polyethylene glycol (MIRALAX) packet 17 g  17 g Oral DAILY PRN Parminder Bella MD        ondansetron (ZOFRAN ODT) tablet 4 mg  4 mg Oral Q8H PRN Kvng CORONADO MD        Or    ondansetron (ZOFRAN) injection 4 mg  4 mg IntraVENous Q6H PRN Parminder Bella MD        hydrOXYchloroQUINE (PLAQUENIL) tablet 200 mg  200 mg Oral DAILY WITH BREAKFAST Kvng CORONADO MD        0.9% sodium chloride infusion 250 mL  250 mL IntraVENous PRN Kvng CORONADO MD            Allergies   Allergen Reactions    Pcn [Penicillins] Rash       Review of Systems:  ROS     Objective:     Vitals:    12/08/21 1340 12/08/21 1430 12/08/21 1505 12/08/21 1519   BP: 119/67 122/64 (!) 143/97 (!) 169/79   Pulse: 81 68 89 88   Resp: 20 20 16 18   Temp: 97.6 °F (36.4 °C) 97.8 °F (36.6 °C) 97 °F (36.1 °C) 97 °F (36.1 °C)   SpO2: 100% 100% 100% 100%   Weight:       Height:            Physical Exam:  Physical Exam     Recent Results (from the past 24 hour(s))   OCCULT BLOOD, STOOL    Collection Time: 12/07/21  5:55 PM   Result Value Ref Range    Occult Blood,day 1 Positive (A) Negative     URINALYSIS W/MICROSCOPIC    Collection Time: 12/07/21  6:02 PM   Result Value Ref Range    Color Yellow      Appearance Turbid (A) Clear      Specific gravity 1.020 1.003 - 1.030      pH (UA) 5.0 5.0 - 8.0      Protein Negative Negative mg/dL    Glucose Negative Negative mg/dL    Ketone 5 (A) Negative mg/dL    Bilirubin Negative Negative      Blood Small (A) Negative      Urobilinogen 2.0 (H) 0.1 - 1.0 EU/dL    Nitrites Negative Negative      Leukocyte Esterase Small (A) Negative      WBC 0-5 0 - 4 /hpf    RBC 0-5 0 - 5 /hpf    Bacteria 1+ (A) Negative /hpf    Mucus Trace /lpf   HGB & HCT    Collection Time: 12/07/21 10:23 PM   Result Value Ref Range    HGB 8.8 (L) 11.5 - 16.0 g/dL    HCT 26.7 (L) 35.0 - 35.0 %   METABOLIC PANEL, COMPREHENSIVE    Collection Time: 12/08/21  4:19 AM   Result Value Ref Range    Sodium 132 (L) 136 - 145 mmol/L    Potassium 4.3 3.5 - 5.1 mmol/L    Chloride 99 97 - 108 mmol/L    CO2 27 21 - 32 mmol/L    Anion gap 6 5 - 15 mmol/L    Glucose 151 (H) 65 - 100 mg/dL    BUN 41 (H) 6 - 20 mg/dL    Creatinine 0.86 0.55 - 1.02 mg/dL    BUN/Creatinine ratio 48 (H) 12 - 20      GFR est AA >60 >60 ml/min/1.73m2    GFR est non-AA >60 >60 ml/min/1.73m2    Calcium 8.7 8.5 - 10.1 mg/dL    Bilirubin, total 2.6 (H) 0.2 - 1.0 mg/dL    AST (SGOT) 550 (H) 15 - 37 U/L    ALT (SGPT) 328 (H) 12 - 78 U/L    Alk. phosphatase 138 (H) 45 - 117 U/L    Protein, total 7.0 6.4 - 8.2 g/dL    Albumin 1.6 (L) 3.5 - 5.0 g/dL    Globulin 5.4 (H) 2.0 - 4.0 g/dL    A-G Ratio 0.3 (L) 1.1 - 2.2     PROTHROMBIN TIME + INR    Collection Time: 12/08/21  4:19 AM   Result Value Ref Range    Prothrombin time 21.0 (H) 11.9 - 14.7 sec    INR 1.8 (H) 0.9 - 1.1     HGB & HCT    Collection Time: 12/08/21  4:19 AM   Result Value Ref Range    HGB 7.3 (L) 11.5 - 16.0 g/dL    HCT 21.9 (L) 35.0 - 47.0 %   GLUCOSE, POC    Collection Time: 12/08/21  8:19 AM   Result Value Ref Range    Glucose (POC) 176 (H) 65 - 117 mg/dL    Performed by Enmanuel Lauren    HGB & HCT    Collection Time: 12/08/21 11:18 AM   Result Value Ref Range    HGB 9.7 (L) 11.5 - 16.0 g/dL    HCT 29.4 (L) 35.0 - 47.0 %   GLUCOSE, POC    Collection Time: 12/08/21  2:28 PM   Result Value Ref Range    Glucose (POC) 145 (H) 65 - 117 mg/dL    Performed by NONA AZEVEDO         XR CHEST PA LAT   Final Result   No acute cardiopulmonary abnormality.          Assessment:     Hospital Problems  Date Reviewed: 12/8/2021          Codes Class Noted POA GI bleed ICD-10-CM: K92.2  ICD-9-CM: 578.9  12/7/2021 Unknown              Plan:       Signed By: Mike Arellano MD     December 8, 2021         Thank you for allowing me to participate in this patients care  Cc Referring Physician   Caitlin Horne NP

## 2021-12-08 NOTE — ANESTHESIA POSTPROCEDURE EVALUATION
Procedure(s):  ESOPHAGOGASTRODUODENOSCOPY (EGD)  ESOPHAGOGASTRODUODENAL (EGD) BIOPSY.     total IV anesthesia, MAC    Anesthesia Post Evaluation      Multimodal analgesia: multimodal analgesia not used between 6 hours prior to anesthesia start to PACU discharge  Patient participation: complete - patient participated  Level of consciousness: awake and alert  Pain score: 0  Pain management: adequate  Airway patency: patent  Anesthetic complications: no  Cardiovascular status: acceptable, blood pressure returned to baseline and hemodynamically stable  Hydration status: acceptable  Post anesthesia nausea and vomiting:  none  Final Post Anesthesia Temperature Assessment:  Normothermia (36.0-37.5 degrees C)      INITIAL Post-op Vital signs:   Vitals Value Taken Time   /97 12/08/21 1505   Temp 36.1 °C (97 °F) 12/08/21 1505   Pulse 89 12/08/21 1505   Resp 16 12/08/21 1505   SpO2 100 % 12/08/21 1505

## 2021-12-08 NOTE — ED NOTES
Bedside and Verbal shift change report given to Ivy Tolbert RN (oncoming nurse) by Lucia Mandel RN (offgoing nurse). Report included the following information SBAR, ED Summary and MAR.

## 2021-12-08 NOTE — CONSULTS
Consult    Patient: Lubna Sheikh MRN: [id number]  SSN: [id number]    YOB: 1945  Age: 77 y.o. Sex: female      Subjective:      Lubna Sheikh is a 77 y.o. female who is being seen for melena and hematemesis. History from patient, is reliable      Patient has a history of lupus, autoimmune hepatitis,who presented emergency room with several black bowel movement, one-time hematemesis, no similar episode before. patient has been budesonide  on for couple days for the treatment of autoimmune hepatitis, with increase of transaminase, after starting medication patient have some abdominal discomfort, started has melena day before the patient not on NSAIDs, not on aspirin, not on any anticoagulations,  In emergency patient was found to have anemia, hemoglobin dropped 3 g from the baseline, with mild abdominal discomfort but no severe abdominal, chest pain, no dysphagia, no fever no chills. Patient has been with the ICU due to the hematemesis episode, tachycardia episode in the emergency room.   ER evaluation was noted, no orthostatic episode,  Past Medical History:   Diagnosis Date    Diabetes ([address]     Elevated liver enzymes     Hypertension     Lupus (HCC)      Past Surgical History:   Procedure Laterality Date    HX CARPAL TUNNEL RELEASE      HX ORTHOPAEDIC      back     HX OTHER SURGICAL      gallbladder removal    IR BX LIVER PERCUTANEOUS  11/9/2021    [place] BREAST SURGERY PROCEDURE UNLISTED Left     lumpectomy      Family History   Problem Relation Age of Onset    Cancer Mother         breast    Lupus Mother     No Known Problems Father      Social History     Tobacco Use    Smoking status: Never Smoker    Smokeless tobacco: Never Used   Substance Use Topics    Alcohol use: Yes     Comment: a glass of wine every 6 months      Current Facility-Administered Medications   Medication Dose Route Frequency Provider Last Rate Last Admin    0.9% sodium chloride infusion 250 mL  250 mL IntraVENous PRN Wyatt Collet I, MD        sodium chloride (NS) flush 5-40 mL  5-40 mL IntraVENous Q8H Wyatt Collet I, MD        sodium chloride (NS) flush 5-40 mL  5-40 mL IntraVENous PRN Wyatt Collet I, MD        ondansetron [facility]) injection 4 mg  4 mg IntraVENous Q4H PRN Elvi Fabian MD        pantoprazole (PROTONIX) 40 mg in 0.9% sodium chloride 10 mL injection  40 mg IntraVENous Q12H Wyatt Collet I, MD   40 mg at 12/08/21 0900    melatonin tablet 3 mg  3 mg Oral QHS PRN Elvi Fabian MD        0.9% sodium chloride infusion  75 mL/hr IntraVENous PRN Elvi Fabian MD   New Bag at 12/08/21 1455    cefTRIAXone (ROCEPHIN) 1 g in sterile water (preservative free) 10 mL IV syringe  1 g IntraVENous Q24H Wyatt Collet I, MD        sodium chloride (NS) flush 5-40 mL  5-40 mL IntraVENous Q8H Wyatt Collet I, MD        sodium chloride (NS) flush 5-40 mL  5-40 mL IntraVENous PRN Elvi Fabian MD        acetaminophen (TYLENOL) tablet 650 mg  650 mg Oral Q6H PRN Elvi Fabian MD        Or   [employer] acetaminophen (TYLENOL) suppository 650 mg  650 mg Rectal Q6H PRN Elvi Fabian MD        polyethylene glycol (MIRALAX) packet 17 g  17 g Oral DAILY PRN Wyatt Collet I, MD        ondansetron (ZOFRAN ODT) tablet 4 mg  4 mg Oral Q8H PRN Wyatt Collet I, MD        Or    ondansetron (ZOFRAN) injection 4 mg  4 mg IntraVENous Q6H PRN Wyatt Collet I, MD        hydrOXYchloroQUINE (PLAQUENIL) tablet 200 mg  200 mg Oral DAILY WITH BREAKFAST Raymon Carrasco MD        0.9% sodium chloride infusion 250 mL  250 mL IntraVENous PRN Elvi Fabian MD            Allergies   Allergen Reactions    Pcn [Penicillins] Rash       Review of Systems:  Constitutional: Negative. Negative for appetite change, chills, fatigue and fever. HENT: Negative. Negative for congestion and sinus pain. Eyes: Negative. Negative for pain and visual disturbance. Respiratory: Negative.   Negative for chest tightness and shortness of breath. Cardiovascular: Negative. Negative for chest pain. Gastrointestinal: Positive for nausea and vomiting. Negative for abdominal pain and diarrhea. + melena       Black stools and black in her vomitus   Genitourinary: Negative. Negative for difficulty urinating. No discharge   Musculoskeletal:  Negative for arthralgias. Skin: Negative. Negative for rash. Neurological: Negative. Negative for weakness and headaches. Hematological:  no prior  bleeding episode     Objective:     Vitals:    12/08/21 1340 12/08/21 1430 12/08/21 1505 12/08/21 1519   BP: 119/67 122/64 (!) 143/97 (!) 169/79   Pulse: 81 68 89 88   Resp: 20 20 16 18   Temp: 97.6 °F (36.4 °C) 97.8 °F (36.6 °C) 97 °F (36.1 °C) 97 °F (36.1 °C)   SpO2: 100% 100% 100% 100%   Weight:       Height:            Physical Exam:  Physical Exam  Constitutional:       Appearance: She is ill-appearing. HENT:      Head: Atraumatic. Mouth/Throat:      Mouth: Mucous membranes are dry. Eyes:      Extraocular Movements: Extraocular movements intact. Pupils: Pupils are equal, round, and reactive to light. Cardiovascular:      Rate and Rhythm: Normal rate and regular rhythm. Pulses: Normal pulses. Pulmonary:      Effort: Pulmonary effort is normal.      Breath sounds: No wheezing, rhonchi or rales. Abdominal:      General: Bowel sounds are normal.      Tenderness: There is abdominal tenderness. There is no rebound. Hernia: No hernia is present. Musculoskeletal:         General: Normal range of motion. Cervical back: Normal range of motion and neck supple. Skin:     General: Skin is warm. Neurological:      General: No focal deficit present. Mental Status: She is oriented to person, place, and time.    Psychiatric:         Mood and Affect: Mood normal.          Recent Results (from the past 24 hour(s))   OCCULT BLOOD, STOOL    Collection Time: 12/07/21  5:55 PM   Result Value Ref Range    Occult Blood,day 1 Positive (A) Negative     URINALYSIS W/MICROSCOPIC    Collection Time: 12/07/21  6:02 PM   Result Value Ref Range    Color Yellow      Appearance Turbid (A) Clear      Specific gravity 1.020 1.003 - 1.030      pH (UA) 5.0 5.0 - 8.0      Protein Negative Negative mg/dL    Glucose Negative Negative mg/dL    Ketone 5 (A) Negative mg/dL    Bilirubin Negative Negative      Blood Small (A) Negative      Urobilinogen 2.0 (H) 0.1 - 1.0 EU/dL    Nitrites Negative Negative      Leukocyte Esterase Small (A) Negative      WBC 0-5 0 - 4 /hpf    RBC 0-5 0 - 5 /hpf    Bacteria 1+ (A) Negative /hpf    Mucus Trace /lpf   HGB & HCT    Collection Time: 12/07/21 10:23 PM   Result Value Ref Range    HGB 8.8 (L) 11.5 - 16.0 g/dL    HCT 26.7 (L) 35.0 - 88.5 %   METABOLIC PANEL, COMPREHENSIVE    Collection Time: 12/08/21  4:19 AM   Result Value Ref Range    Sodium 132 (L) 136 - 145 mmol/L    Potassium 4.3 3.5 - 5.1 mmol/L    Chloride 99 97 - 108 mmol/L    CO2 27 21 - 32 mmol/L    Anion gap 6 5 - 15 mmol/L    Glucose 151 (H) 65 - 100 mg/dL    BUN 41 (H) 6 - 20 mg/dL    Creatinine 0.86 0.55 - 1.02 mg/dL    BUN/Creatinine ratio 48 (H) 12 - 20      GFR est AA >60 >60 ml/min/1.73m2    GFR est non-AA >60 >60 ml/min/1.73m2    Calcium 8.7 8.5 - 10.1 mg/dL    Bilirubin, total 2.6 (H) 0.2 - 1.0 mg/dL    AST (SGOT) 550 (H) 15 - 37 U/L    ALT (SGPT) 328 (H) 12 - 78 U/L    Alk.  phosphatase 138 (H) 45 - 117 U/L    Protein, total 7.0 6.4 - 8.2 g/dL    Albumin 1.6 (L) 3.5 - 5.0 g/dL    Globulin 5.4 (H) 2.0 - 4.0 g/dL    A-G Ratio 0.3 (L) 1.1 - 2.2     PROTHROMBIN TIME + INR    Collection Time: 12/08/21  4:19 AM   Result Value Ref Range    Prothrombin time 21.0 (H) 11.9 - 14.7 sec    INR 1.8 (H) 0.9 - 1.1     HGB & HCT    Collection Time: 12/08/21  4:19 AM   Result Value Ref Range    HGB 7.3 (L) 11.5 - 16.0 g/dL    HCT 21.9 (L) 35.0 - 47.0 %   GLUCOSE, POC    Collection Time: 12/08/21  8:19 AM   Result Value Ref Range    Glucose (POC) 176 (H) 65 - 117 mg/dL    Performed by Cori Single    HGB & HCT    Collection Time: 12/08/21 11:18 AM   Result Value Ref Range    HGB 9.7 (L) 11.5 - 16.0 g/dL    HCT 29.4 (L) 35.0 - 47.0 %   GLUCOSE, POC    Collection Time: 12/08/21  2:28 PM   Result Value Ref Range    Glucose (POC) 145 (H) 65 - 117 mg/dL    Performed by NONA AZEVEDO         XR CHEST PA LAT   Final Result   No acute cardiopulmonary abnormality. Assessment:   5/21  US      IMPRESSION  Nodular, heterogeneous liver which could indicate hepatic  parenchymal disease. Status post cholecystectomy. Common bile duct dilatation  noted. MRI    IMPRESSION  Dilatation of the common bile duct likely represents this patient's  postoperative baseline. Suspect early diffuse liver disease      Hospital Problems  Date Reviewed: 12/8/2021          Codes Class Noted POA    GI bleed ICD-10-CM: K92.2  ICD-9-CM: 578.9  12/7/2021 Unknown          Melena, hematemesis, upper GI bleeding likely,    Long  autoimmune hepatitis, chronic, possible liver failure, with slight increase of PT/INR    MELD score 20, unknown baseline score, she was not on liver transplant list,   no documented portal hypertension, varices, mild history of hepatic encephalopathy,    History of lupus, increased INR from antiphospholipid syndrome not necessary from liver failure.       Jaundice, likely from the intrahepatic cholestasis, small bile duct inflammatory process,  part of autoimmune hepatc  disease,  No extrahepatic bile obstruction, based on the ultrasound MRI which performed earlier this year    Plan:   Patient admitted to ICU for monitoring  Follow the patient hemoglobin closely, may need blood transfusion if it drops further   Hold budesonide for now, may start on IV Solu-Medrol if needed if transaminase increase dramatically  Will give vit k 10 mg subcu, to differentiate if INR elevations is  from intrahepatic cholestasis or from liver cell dysfunction    Monitoring patient PT/INR, liver panel, renal function close, daily MELD score, if patient has MELD score increasing dramatically, may need liver center referral    Continue IV Protonix  1 g Rocephin has been given, continue on daily basis  N.p.o. for now    An EGD is scheduled for  further evaluation for treatment of upper GI bleeding urgently,  The indications, risks, options, limitation of the test discussed with patient.     Case discussed with ER, admitting physician   Signed By: Nila Mcgee MD     December 8, 2021         Thank you for allowing me to participate in this patients care  Cc Referring Physician   Lexus Montaño, SRIDHAR

## 2021-12-08 NOTE — PROGRESS NOTES
12/8/21. PCP is JIMMY Mitchell - seen 4 mos ago. D/C Plan is home with  Jabari Martinez @ [phone]) & he will transport pt home upon discharge. Pt uses no DME/no home health.

## 2021-12-08 NOTE — ED NOTES
1900 Assumed care of pt. Pt denies pain currently. No signs of acute or respiratory distress noted. Pt resting comfortably, bed in lowest position and call bell within reach. Pt daughter at bedside.

## 2021-12-08 NOTE — H&P
History and Physical    Patient: Kvng Danielson MRN: [id number]  SSN: [id number]    YOB: 1945  Age: 77 y.o. Sex: female      Subjective:      Kvng Danielson is a 77 y.o. female who has a history of diabetes, elevated liver enzymes due to autoimmune hepatitis hypertension and lupus recently started on budesonide about 5 days ago and developed passage of black stool with a one-time hematemesis black substance hemoglobin is down to 10 with a 3 g drop. White blood cell count is elevated and UA suggestive of UTI. Patient had multiple episodes of diarrhea with black stool    Past Medical History:   Diagnosis Date    Diabetes ([address]     Elevated liver enzymes     Hypertension     [facility]      Past Surgical History:   Procedure Laterality Date    HX CARPAL TUNNEL RELEASE      HX ORTHOPAEDIC      back     HX OTHER SURGICAL      gallbladder removal    IR BX LIVER PERCUTANEOUS  11/9/2021    [place] BREAST SURGERY PROCEDURE UNLISTED Left     lumpectomy      Family History   Problem Relation Age of Onset    Cancer Mother         breast    Lupus Mother     No Known Problems Father      Social History     Tobacco Use    Smoking status: Never Smoker    Smokeless tobacco: Never Used   Substance Use Topics    Alcohol use: Yes     Comment: a glass of wine every 6 months      Prior to Admission medications    Medication Sig Start Date End Date Taking? Authorizing Provider   budesonide (ENTOCORT EC) 3 mg capsule Take 6 mg by mouth every morning. Yes Other, MD Nuvia   metFORMIN (GLUCOPHAGE) 500 mg tablet Take 500 mg by mouth daily (with breakfast). Provider, Historical   metoprolol tartrate (LOPRESSOR) 100 mg IR tablet Take 100 mg by mouth daily. Provider, Historical   hydrOXYchloroQUINE (PlaqueniL) 200 mg tablet Take 200 mg by mouth daily. Provider, Historical   candesartan-hydroCHLOROthiazide (ATACAND HCT) 32-25 mg tab per tablet Take 1 Tablet by mouth daily.     Provider, Historical        Allergies   Allergen Reactions    Pcn [Penicillins] Rash       Review of Systems:  A comprehensive review of systems was negative except for that written in the History of Present Illness. Objective:     Vitals:    12/07/21 1311 12/07/21 1315 12/07/21 1729 12/07/21 1909   BP: 133/75  109/63 109/68   Pulse: (!) 122  94 91   Resp: 18  21 15   Temp: 98.4 °F (36.9 °C)      SpO2: 99% 99% 100% 100%   Weight: 58.5 kg (129 lb)      Height: 5' (1.524 m)           Physical Exam:  General:  Alert, cooperative, no distress, appears stated age. Eyes:  Conjunctivae/corneas clear. PERRL, EOMs intact. Fundi benign   Ears:  Normal TMs and external ear canals both ears. Nose: Nares normal. Septum midline. Mucosa normal. No drainage or sinus tenderness. Mouth/Throat: Lips, mucosa, and tongue dry. Teeth and gums normal.   Neck: Supple, symmetrical, trachea midline, no adenopathy, thyroid: no enlargment/tenderness/nodules, no carotid bruit and no JVD. Back:   Symmetric, no curvature. ROM normal. No CVA tenderness. Lungs:   Clear to auscultation bilaterally. Heart:  Regular rate and rhythm, S1, S2 normal, no murmur, click, rub or gallop. Abdomen:   Soft, non-tender. Bowel sounds normal. No masses,  No organomegaly. Extremities: Extremities normal, atraumatic, no cyanosis or edema. Pulses: 2+ and symmetric all extremities. Skin: Skin color, texture, turgor normal. No rashes or lesions   Lymph nodes: Cervical, supraclavicular, and axillary nodes normal.   Neurologic: CNII-XII intact. Normal strength, sensation and reflexes throughout.      Recent Results (from the past 24 hour(s))   CBC WITH AUTOMATED DIFF    Collection Time: 12/07/21  1:15 PM   Result Value Ref Range    WBC 18.3 (H) 3.6 - 11.0 K/uL    RBC 3.02 (L) 3.80 - 5.20 M/uL    HGB 10.0 (L) 11.5 - 16.0 g/dL    HCT 30.5 (L) 35.0 - 47.0 %    .0 (H) 80.0 - 99.0 FL    MCH 33.1 26.0 - 34.0 PG    MCHC 32.8 30.0 - 36.5 g/dL    RDW 15.8 (H) 11.5 - 14.5 %    PLATELET 658 412 - 715 K/uL    MPV 12.1 8.9 - 12.9 FL    NRBC 0.1 (H) 0.0  WBC    ABSOLUTE NRBC 0.02 (H) 0.00 - 0.01 K/uL    NEUTROPHILS 76 (H) 32 - 75 %    LYMPHOCYTES 11 (L) 12 - 49 %    MONOCYTES 11 5 - 13 %    EOSINOPHILS 0 0 - 7 %    BASOPHILS 1 0 - 1 %    IMMATURE GRANULOCYTES 1 (H) 0 - 0.5 %    ABS. NEUTROPHILS 14.0 (H) 1.8 - 8.0 K/UL    ABS. LYMPHOCYTES 2.0 0.8 - 3.5 K/UL    ABS. MONOCYTES 2.0 (H) 0.0 - 1.0 K/UL    ABS. EOSINOPHILS 0.0 0.0 - 0.4 K/UL    ABS. BASOPHILS 0.1 0.0 - 0.1 K/UL    ABS. IMM. GRANS. 0.2 (H) 0.00 - 0.04 K/UL    DF AUTOMATED     METABOLIC PANEL, COMPREHENSIVE    Collection Time: 12/07/21  1:15 PM   Result Value Ref Range    Sodium 129 (L) 136 - 145 mmol/L    Potassium 4.9 3.5 - 5.1 mmol/L    Chloride 97 97 - 108 mmol/L    CO2 20 (L) 21 - 32 mmol/L    Anion gap 12 5 - 15 mmol/L    Glucose 159 (H) 65 - 100 mg/dL    BUN 43 (H) 6 - 20 mg/dL    Creatinine 1.14 (H) 0.55 - 1.02 mg/dL    BUN/Creatinine ratio 38 (H) 12 - 20      GFR est AA 56 (L) >60 ml/min/1.73m2    GFR est non-AA 46 (L) >60 ml/min/1.73m2    Calcium 9.6 8.5 - 10.1 mg/dL    Bilirubin, total 3.9 (H) 0.2 - 1.0 mg/dL    AST (SGOT) 610 (H) 15 - 37 U/L    ALT (SGPT) 345 (H) 12 - 78 U/L    Alk.  phosphatase 183 (H) 45 - 117 U/L    Protein, total 8.7 (H) 6.4 - 8.2 g/dL    Albumin 1.9 (L) 3.5 - 5.0 g/dL    Globulin 6.8 (H) 2.0 - 4.0 g/dL    A-G Ratio 0.3 (L) 1.1 - 2.2     TYPE & SCREEN    Collection Time: 12/07/21  1:25 PM   Result Value Ref Range    Crossmatch Expiration 12/10/2021,2359     ABO/Rh(D) A Positive     Antibody screen Negative    TROPONIN-HIGH SENSITIVITY    Collection Time: 12/07/21  1:25 PM   Result Value Ref Range    Troponin-High Sensitivity 45 0 - 51 ng/L   OCCULT BLOOD, STOOL    Collection Time: 12/07/21  5:55 PM   Result Value Ref Range    Occult Blood,day 1 Positive (A) Negative     URINALYSIS W/MICROSCOPIC    Collection Time: 12/07/21  6:02 PM   Result Value Ref Range    Color Yellow Appearance Turbid (A) Clear      Specific gravity 1.020 1.003 - 1.030      pH (UA) 5.0 5.0 - 8.0      Protein Negative Negative mg/dL    Glucose Negative Negative mg/dL    Ketone 5 (A) Negative mg/dL    Bilirubin Negative Negative      Blood Small (A) Negative      Urobilinogen 2.0 (H) 0.1 - 1.0 EU/dL    Nitrites Negative Negative      Leukocyte Esterase Small (A) Negative      WBC 0-5 0 - 4 /hpf    RBC 0-5 0 - 5 /hpf    Bacteria 1+ (A) Negative /hpf    Mucus Trace /lpf         Assessment:     Hospital Problems  Never Reviewed          Codes Class Noted POA    GI bleed ICD-10-CM: K92.2  ICD-9-CM: 578.9  12/7/2021 Unknown          Acute GI bleed upper possibly due to oral steroids  Anemia secondary to upper GI bleed acute  Hematemesis x1  UTI  Volume depletion from recurrent passage of black stool  History of lupus  Autoimmune hepatitis recently started on budesonide we will keep that on hold      Plan:     Admit patient to ICU group and crossmatch 2 units of packed red blood cells  IV normal saline  IV antibiotics  GI doctor was already consulted by the emergency room physician  Resume hydroxychloroquine by critical care time of 60 minutes  I discussed with the patient and the daughter at the bedside extensively    Signed By: Iza Funez MD     December 7, 2021

## 2021-12-09 LAB
ABO + RH BLD: NORMAL
ALBUMIN SERPL-MCNC: 1.7 G/DL (ref 3.5–5)
ALBUMIN/GLOB SERPL: 0.3 {RATIO} (ref 1.1–2.2)
ALP SERPL-CCNC: 157 U/L (ref 45–117)
ALT SERPL-CCNC: 320 U/L (ref 12–78)
ANION GAP SERPL CALC-SCNC: 6 MMOL/L (ref 5–15)
AST SERPL W P-5'-P-CCNC: 431 U/L (ref 15–37)
BILIRUB SERPL-MCNC: 3.6 MG/DL (ref 0.2–1)
BLD PROD TYP BPU: NORMAL
BLD PROD TYP BPU: NORMAL
BLOOD GROUP ANTIBODIES SERPL: NEGATIVE
BPU ID: NORMAL
BPU ID: NORMAL
BUN SERPL-MCNC: 27 MG/DL (ref 6–20)
BUN/CREAT SERPL: 36 (ref 12–20)
CA-I BLD-MCNC: 8.6 MG/DL (ref 8.5–10.1)
CHLORIDE SERPL-SCNC: 109 MMOL/L (ref 97–108)
CO2 SERPL-SCNC: 24 MMOL/L (ref 21–32)
CREAT SERPL-MCNC: 0.75 MG/DL (ref 0.55–1.02)
CROSSMATCH RESULT,%XM: NORMAL
CROSSMATCH RESULT,%XM: NORMAL
GLOBULIN SER CALC-MCNC: 5.7 G/DL (ref 2–4)
GLUCOSE SERPL-MCNC: 107 MG/DL (ref 65–100)
HCT VFR BLD AUTO: 33.2 % (ref 35–47)
HCT VFR BLD AUTO: 34.2 % (ref 35–47)
HCT VFR BLD AUTO: 34.6 % (ref 35–47)
HGB BLD-MCNC: 11.4 G/DL (ref 11.5–16)
HGB BLD-MCNC: 11.6 G/DL (ref 11.5–16)
HGB BLD-MCNC: 11.7 G/DL (ref 11.5–16)
INR PPP: 1.7 (ref 0.9–1.1)
POTASSIUM SERPL-SCNC: 4 MMOL/L (ref 3.5–5.1)
PROT SERPL-MCNC: 7.4 G/DL (ref 6.4–8.2)
PROTHROMBIN TIME: 19.9 SEC (ref 11.9–14.7)
SODIUM SERPL-SCNC: 139 MMOL/L (ref 136–145)
SPECIMEN EXP DATE BLD: NORMAL
STATUS OF UNIT,%ST: NORMAL
STATUS OF UNIT,%ST: NORMAL
TRANSFUSION STATUS PATIENT QL: NORMAL
TRANSFUSION STATUS PATIENT QL: NORMAL
UNIT DIVISION, %UDIV: 0
UNIT DIVISION, %UDIV: 0

## 2021-12-09 PROCEDURE — C9113 INJ PANTOPRAZOLE SODIUM, VIA: HCPCS | Performed by: INTERNAL MEDICINE

## 2021-12-09 PROCEDURE — 74011250636 HC RX REV CODE- 250/636: Performed by: INTERNAL MEDICINE

## 2021-12-09 PROCEDURE — 85014 HEMATOCRIT: CPT

## 2021-12-09 PROCEDURE — 74011250637 HC RX REV CODE- 250/637: Performed by: INTERNAL MEDICINE

## 2021-12-09 PROCEDURE — 80053 COMPREHEN METABOLIC PANEL: CPT

## 2021-12-09 PROCEDURE — 65270000029 HC RM PRIVATE

## 2021-12-09 PROCEDURE — 85610 PROTHROMBIN TIME: CPT

## 2021-12-09 PROCEDURE — 36415 COLL VENOUS BLD VENIPUNCTURE: CPT

## 2021-12-09 PROCEDURE — 74011000250 HC RX REV CODE- 250: Performed by: INTERNAL MEDICINE

## 2021-12-09 RX ORDER — FAMOTIDINE 20 MG/1
20 TABLET, FILM COATED ORAL 2 TIMES DAILY
Status: DISCONTINUED | OUTPATIENT
Start: 2021-12-09 | End: 2021-12-10 | Stop reason: HOSPADM

## 2021-12-09 RX ADMIN — HYDROXYCHLOROQUINE SULFATE 200 MG: 200 TABLET, FILM COATED ORAL at 09:00

## 2021-12-09 RX ADMIN — Medication 5 ML: at 06:00

## 2021-12-09 RX ADMIN — CEFTRIAXONE 1 G: 1 INJECTION, POWDER, FOR SOLUTION INTRAMUSCULAR; INTRAVENOUS at 21:20

## 2021-12-09 RX ADMIN — FAMOTIDINE 20 MG: 20 TABLET ORAL at 21:20

## 2021-12-09 RX ADMIN — SODIUM CHLORIDE 40 MG: 9 INJECTION, SOLUTION INTRAMUSCULAR; INTRAVENOUS; SUBCUTANEOUS at 09:00

## 2021-12-09 NOTE — PROGRESS NOTES
Progress Note    Patient: Shawnee Barakat MRN: [id number]  SSN: [id number]    YOB: 1945  Age: 77 y.o.   Sex: female      Admit Date: 12/7/2021    LOS: 2 days     Subjective:   Patient examined, seen, no hematemesis, mild nausea, no vomiting, no abdominal pain,  Past Medical History:   Diagnosis Date    Diabetes ([facility]     Elevated liver enzymes     Hypertension     Lupus ([facility]         Current Facility-Administered Medications:     ondansetron (ZOFRAN) injection 4 mg, 4 mg, IntraVENous, Q4H PRN, Raymon Young MD    pantoprazole (PROTONIX) 40 mg in 0.9% sodium chloride 10 mL injection, 40 mg, IntraVENous, Q12H, Raymon Carrasco MD, 40 mg at 12/09/21 0900    melatonin tablet 3 mg, 3 mg, Oral, QHS PRN, Ashley CORONADO MD    0.9% sodium chloride infusion, 75 mL/hr, IntraVENous, PRN, Ashley CORONADO MD, Stopped at 12/09/21 0000    cefTRIAXone (ROCEPHIN) 1 g in sterile water (preservative free) 10 mL IV syringe, 1 g, IntraVENous, Q24H, Raymon Carrasco MD, 1 g at 12/08/21 2051    sodium chloride (NS) flush 5-40 mL, 5-40 mL, IntraVENous, Q8H, Raymon Carrasco MD, 5 mL at 12/09/21 0600    sodium chloride (NS) flush 5-40 mL, 5-40 mL, IntraVENous, PRN, Ashley CORONADO MD    acetaminophen (TYLENOL) tablet 650 mg, 650 mg, Oral, Q6H PRN **OR** acetaminophen (TYLENOL) suppository 650 mg, 650 mg, Rectal, Q6H PRN, Raymon Young MD    polyethylene glycol (MIRALAX) packet 17 g, 17 g, Oral, DAILY PRN, Raymon Young MD    ondansetron (ZOFRAN ODT) tablet 4 mg, 4 mg, Oral, Q8H PRN **OR** ondansetron (ZOFRAN) injection 4 mg, 4 mg, IntraVENous, Q6H PRN, Raymon Carrasco MD    hydrOXYchloroQUINE (PLAQUENIL) tablet 200 mg, 200 mg, Oral, DAILY WITH BREAKFAST, Raymon Carrasco MD, 200 mg at 12/09/21 0900    0.9% sodium chloride infusion 250 mL, 250 mL, IntraVENous, PRN, Ashley CORONADO MD    Objective:     Vitals:    12/09/21 1200 12/09/21 1300 12/09/21 1400 12/09/21 1500   BP: 126/70 111/70 120/63 119/73   Pulse: 73      Resp: 17 14 16 16   Temp:    97.7 °F (36.5 °C)   SpO2: 99% 99% 99% 99%   Weight:       Height:            Intake and Output:  Current Shift: 12/09 0701 - 12/09 1900  In: 400 [P.O.:400]  Out: -   Last three shifts: 12/07 1901 - 12/09 0700  In: 1360 [I.V.:60]  Out: -     Physical Exam:   Physical Exam  HENT:      Head: Atraumatic. Mouth/Throat:      Mouth: Mucous membranes are dry. Eyes:      General:         Left eye: No discharge. Cardiovascular:      Rate and Rhythm: Regular rhythm. Heart sounds: Normal heart sounds. Pulmonary:      Effort: Pulmonary effort is normal.      Breath sounds: Normal breath sounds. Abdominal:      General: Abdomen is flat. Tenderness: There is no abdominal tenderness. Hernia: No hernia is present. Skin:     General: Skin is warm. Neurological:      General: No focal deficit present. Mental Status: Mental status is at baseline. Sensory: No sensory deficit. Motor: No weakness.       Coordination: Coordination normal.   Psychiatric:         Mood and Affect: Mood normal.          Lab/Data Review:  Recent Results (from the past 24 hour(s))   HGB & HCT    Collection Time: 12/08/21  6:04 PM   Result Value Ref Range    HGB 12.3 11.5 - 16.0 g/dL    HCT 36.3 35.0 - 47.0 %   HGB & HCT    Collection Time: 12/08/21 11:40 PM   Result Value Ref Range    HGB 11.6 11.5 - 16.0 g/dL    HCT 34.2 (L) 35.0 - 38.5 %   METABOLIC PANEL, COMPREHENSIVE    Collection Time: 12/09/21  5:45 AM   Result Value Ref Range    Sodium 139 136 - 145 mmol/L    Potassium 4.0 3.5 - 5.1 mmol/L    Chloride 109 (H) 97 - 108 mmol/L    CO2 24 21 - 32 mmol/L    Anion gap 6 5 - 15 mmol/L    Glucose 107 (H) 65 - 100 mg/dL    BUN 27 (H) 6 - 20 mg/dL    Creatinine 0.75 0.55 - 1.02 mg/dL    BUN/Creatinine ratio 36 (H) 12 - 20      GFR est AA >60 >60 ml/min/1.73m2    GFR est non-AA >60 >60 ml/min/1.73m2    Calcium 8.6 8.5 - 10.1 mg/dL    Bilirubin, total 3.6 (H) 0.2 - 1.0 mg/dL    AST (SGOT) 431 (H) 15 - 37 U/L    ALT (SGPT) 320 (H) 12 - 78 U/L    Alk. phosphatase 157 (H) 45 - 117 U/L    Protein, total 7.4 6.4 - 8.2 g/dL    Albumin 1.7 (L) 3.5 - 5.0 g/dL    Globulin 5.7 (H) 2.0 - 4.0 g/dL    A-G Ratio 0.3 (L) 1.1 - 2.2     HGB & HCT    Collection Time: 12/09/21  5:45 AM   Result Value Ref Range    HGB 11.4 (L) 11.5 - 16.0 g/dL    HCT 33.2 (L) 35.0 - 47.0 %   PROTHROMBIN TIME + INR    Collection Time: 12/09/21  5:45 AM   Result Value Ref Range    Prothrombin time 19.9 (H) 11.9 - 14.7 sec    INR 1.7 (H) 0.9 - 1.1          XR CHEST PA LAT   Final Result   No acute cardiopulmonary abnormality.        Melena, hematemesis, large esophageal ulcer, biopsy taken yesterday to rule out CMV infection,   hemoglobin stable   Long  autoimmune hepatitis, chronic, possible liver failure, with slight Improvement PT/INR after vitamin K injection, indicated patient hyper coagulopathy is from liver failure,mild to moderate rather than from the intrahepatic cholestasis     MELD score 20, unknown baseline score, she was not on liver transplant list,   no documented portal hypertension, varices, mild history of hepatic encephalopathy,     History of lupus, increased INR from antiphospholipid syndrome not necessary from liver failure.        Jaundice, likely from the intrahepatic cholestasis, small bile duct inflammatory process,  part of autoimmune hepatc  disease,  No extrahepatic bile obstruction, based on the ultrasound MRI which performed earlier this year     Plan:     Follow the patient hemoglobin  Hold budesonide for now, may start on IV Solu-Medrol if needed if transaminase increase dramatically    Monitoring patient PT/INR, liver panel, renal function close, daily MELD score, if patient has MELD score increasing dramatically, may need liver center referral     Continue po Protonix  1 g Rocephin has been given, continue on daily basis  Full liquid diet, advance as tolerated  Follow the biopsy report  Patient can be transferred out of the ICU         Active Problems:    GI bleed (12/7/2021)        Plan:       Signed By: Melvi Mcleod MD     December 9, 2021        Thank you for allowing me to participate in this patients care  Cc Referring Physician   Albin Esteves NP

## 2021-12-09 NOTE — PROGRESS NOTES
Problem: Falls - Risk of  Goal: *Absence of Falls  Description: Document Constantino Dias Fall Risk and appropriate interventions in the flowsheet.   Outcome: Progressing Towards Goal  Note: Fall Risk Interventions:  Mobility Interventions: Bed/chair exit alarm         Medication Interventions: Evaluate medications/consider consulting pharmacy         History of Falls Interventions: Bed/chair exit alarm         Problem: Patient Education: Go to Patient Education Activity  Goal: Patient/Family Education  Outcome: Progressing Towards Goal

## 2021-12-09 NOTE — PROGRESS NOTES
Progress Note    Patient: Abimael Mejia MRN: [id number]  SSN: [id number]    YOB: 1945  Age: 77 y.o. Sex: female      Admit Date: 12/7/2021    LOS: 1 day     Subjective:     77years old with GI bleed, acute severe anemia, history of autoimmune hepatitis recently started on steroids. Feels better today had 2 units of packed red blood cells yesterday hemoglobin fell down to 7 g from 10 g so patient was admitted to ICU for intensive monitoring and upper endoscopy    Objective:     Vitals:    12/08/21 1600 12/08/21 1700 12/08/21 1800 12/08/21 1920   BP: (!) 109/94 134/69 120/67    Pulse: 73 72 79    Resp: 18 16 17    Temp:    97.6 °F (36.4 °C)   SpO2: 100% 99% 100%    Weight:       Height:            Intake and Output:  Current Shift: No intake/output data recorded. Last three shifts: 12/07 0701 - 12/08 1900  In: 1350 [I.V.:50]  Out: -     Physical Exam:   General:  Alert, cooperative, no distress, appears stated age. Eyes:  Conjunctivae/corneas clear. PERRL, EOMs intact. Fundi benign   Ears:  Normal TMs and external ear canals both ears. Nose: Nares normal. Septum midline. Mucosa normal. No drainage or sinus tenderness. Mouth/Throat: Lips, mucosa, and tongue normal. Teeth and gums normal.   Neck: Supple, symmetrical, trachea midline, no adenopathy, thyroid: no enlargment/tenderness/nodules, no carotid bruit and no JVD. Back:   Symmetric, no curvature. ROM normal. No CVA tenderness. Lungs:   Clear to auscultation bilaterally. Heart:  Regular rate and rhythm, S1, S2 normal, no murmur, click, rub or gallop. Abdomen:   Soft, non-tender. Bowel sounds normal. No masses,  No organomegaly. Extremities: Extremities normal, atraumatic, no cyanosis or edema. Pulses: 2+ and symmetric all extremities. Skin: Skin color, texture, turgor normal. No rashes or lesions   Lymph nodes: Cervical, supraclavicular, and axillary nodes normal.   Neurologic: CNII-XII intact.  Normal strength, sensation and reflexes throughout. Lab/Data Review: All lab results for the last 24 hours reviewed. Recent Results (from the past 24 hour(s))   HGB & HCT    Collection Time: 12/07/21 10:23 PM   Result Value Ref Range    HGB 8.8 (L) 11.5 - 16.0 g/dL    HCT 26.7 (L) 35.0 - 12.7 %   METABOLIC PANEL, COMPREHENSIVE    Collection Time: 12/08/21  4:19 AM   Result Value Ref Range    Sodium 132 (L) 136 - 145 mmol/L    Potassium 4.3 3.5 - 5.1 mmol/L    Chloride 99 97 - 108 mmol/L    CO2 27 21 - 32 mmol/L    Anion gap 6 5 - 15 mmol/L    Glucose 151 (H) 65 - 100 mg/dL    BUN 41 (H) 6 - 20 mg/dL    Creatinine 0.86 0.55 - 1.02 mg/dL    BUN/Creatinine ratio 48 (H) 12 - 20      GFR est AA >60 >60 ml/min/1.73m2    GFR est non-AA >60 >60 ml/min/1.73m2    Calcium 8.7 8.5 - 10.1 mg/dL    Bilirubin, total 2.6 (H) 0.2 - 1.0 mg/dL    AST (SGOT) 550 (H) 15 - 37 U/L    ALT (SGPT) 328 (H) 12 - 78 U/L    Alk.  phosphatase 138 (H) 45 - 117 U/L    Protein, total 7.0 6.4 - 8.2 g/dL    Albumin 1.6 (L) 3.5 - 5.0 g/dL    Globulin 5.4 (H) 2.0 - 4.0 g/dL    A-G Ratio 0.3 (L) 1.1 - 2.2     PROTHROMBIN TIME + INR    Collection Time: 12/08/21  4:19 AM   Result Value Ref Range    Prothrombin time 21.0 (H) 11.9 - 14.7 sec    INR 1.8 (H) 0.9 - 1.1     HGB & HCT    Collection Time: 12/08/21  4:19 AM   Result Value Ref Range    HGB 7.3 (L) 11.5 - 16.0 g/dL    HCT 21.9 (L) 35.0 - 47.0 %   GLUCOSE, POC    Collection Time: 12/08/21  8:19 AM   Result Value Ref Range    Glucose (POC) 176 (H) 65 - 117 mg/dL    Performed by Jade Madera    HGB & HCT    Collection Time: 12/08/21 11:18 AM   Result Value Ref Range    HGB 9.7 (L) 11.5 - 16.0 g/dL    HCT 29.4 (L) 35.0 - 47.0 %   GLUCOSE, POC    Collection Time: 12/08/21  2:28 PM   Result Value Ref Range    Glucose (POC) 145 (H) 65 - 117 mg/dL    Performed by Jade Madera    HGB & HCT    Collection Time: 12/08/21  6:04 PM   Result Value Ref Range    HGB 12.3 11.5 - 16.0 g/dL    HCT 36.3 35.0 - 47.0 % Assessment:     Active Problems:    GI bleed (12/7/2021)      Acute GI bleed  Acute severe anemia  Autoimmune hepatitis  Plan:     CBC in a.m.     Signed By: Ignacio Barreto MD     December 8, 2021

## 2021-12-10 VITALS
BODY MASS INDEX: 25.32 KG/M2 | HEIGHT: 60 IN | RESPIRATION RATE: 16 BRPM | HEART RATE: 111 BPM | DIASTOLIC BLOOD PRESSURE: 71 MMHG | WEIGHT: 129 LBS | SYSTOLIC BLOOD PRESSURE: 125 MMHG | TEMPERATURE: 97.5 F | OXYGEN SATURATION: 97 %

## 2021-12-10 LAB
ALBUMIN SERPL-MCNC: 1.5 G/DL (ref 3.5–5)
ALBUMIN/GLOB SERPL: 0.3 {RATIO} (ref 1.1–2.2)
ALP SERPL-CCNC: 148 U/L (ref 45–117)
ALT SERPL-CCNC: 246 U/L (ref 12–78)
ANION GAP SERPL CALC-SCNC: 5 MMOL/L (ref 5–15)
AST SERPL W P-5'-P-CCNC: 271 U/L (ref 15–37)
BILIRUB SERPL-MCNC: 3.2 MG/DL (ref 0.2–1)
BUN SERPL-MCNC: 19 MG/DL (ref 6–20)
BUN/CREAT SERPL: 26 (ref 12–20)
CA-I BLD-MCNC: 8 MG/DL (ref 8.5–10.1)
CHLORIDE SERPL-SCNC: 109 MMOL/L (ref 97–108)
CO2 SERPL-SCNC: 22 MMOL/L (ref 21–32)
CREAT SERPL-MCNC: 0.72 MG/DL (ref 0.55–1.02)
GLOBULIN SER CALC-MCNC: 5.4 G/DL (ref 2–4)
GLUCOSE SERPL-MCNC: 89 MG/DL (ref 65–100)
HCT VFR BLD AUTO: 32.2 % (ref 35–47)
HGB BLD-MCNC: 11 G/DL (ref 11.5–16)
POTASSIUM SERPL-SCNC: 4.2 MMOL/L (ref 3.5–5.1)
PROT SERPL-MCNC: 6.9 G/DL (ref 6.4–8.2)
SODIUM SERPL-SCNC: 136 MMOL/L (ref 136–145)

## 2021-12-10 PROCEDURE — 74011250637 HC RX REV CODE- 250/637: Performed by: INTERNAL MEDICINE

## 2021-12-10 PROCEDURE — 80053 COMPREHEN METABOLIC PANEL: CPT

## 2021-12-10 PROCEDURE — 36415 COLL VENOUS BLD VENIPUNCTURE: CPT

## 2021-12-10 PROCEDURE — 85018 HEMOGLOBIN: CPT

## 2021-12-10 RX ORDER — POLYETHYLENE GLYCOL 3350 17 G/17G
17 POWDER, FOR SOLUTION ORAL DAILY
Qty: 30 PACKET | Refills: 0 | Status: SHIPPED | OUTPATIENT
Start: 2021-12-10 | End: 2022-06-24

## 2021-12-10 RX ORDER — LANOLIN ALCOHOL/MO/W.PET/CERES
3 CREAM (GRAM) TOPICAL
Qty: 30 TABLET | Refills: 0 | Status: SHIPPED | OUTPATIENT
Start: 2021-12-10 | End: 2022-06-24

## 2021-12-10 RX ORDER — PANTOPRAZOLE SODIUM 40 MG/1
40 TABLET, DELAYED RELEASE ORAL DAILY
Qty: 30 TABLET | Refills: 0 | Status: SHIPPED | OUTPATIENT
Start: 2021-12-10

## 2021-12-10 RX ADMIN — HYDROXYCHLOROQUINE SULFATE 200 MG: 200 TABLET, FILM COATED ORAL at 08:36

## 2021-12-10 RX ADMIN — FAMOTIDINE 20 MG: 20 TABLET ORAL at 08:36

## 2021-12-10 NOTE — DISCHARGE SUMMARY
Admit date: 12/7/2021   Admitting Provider: Sean Avila MD    Discharge date: 12/10/2021  Discharging Provider: Sean Avila MD      * Admission Diagnoses: GI bleed [K92.2]    * Discharge Diagnoses:    Hospital Problems as of 12/10/2021 Date Reviewed: 12/8/2021          Codes Class Noted - Resolved POA    GI bleed ICD-10-CM: K92.2  ICD-9-CM: 578.9  12/7/2021 - Present Unknown          Acute severe anemia  Severe esophageal ulcer  Gastritis  Hiatal hernia  Autoimmune hepatitis    * Hospital Course: Patient was admitted with severe anemia. She was seen by the gastroenterologist and had upper endoscopy done which showed the findings above. Patient was on desonide for autoimmune hepatitis she has been told to hold the medication until she is seen by hepatologist    * Procedures:   Procedure(s):  ESOPHAGOGASTRODUODENOSCOPY (EGD)  ESOPHAGOGASTRODUODENAL (EGD) BIOPSY      Consults: GI    Significant Diagnostic Studies: labs:   Recent Results (from the past 24 hour(s))   HGB & HCT    Collection Time: 12/09/21  8:14 PM   Result Value Ref Range    HGB 11.7 11.5 - 16.0 g/dL    HCT 34.6 (L) 35.0 - 57.2 %   METABOLIC PANEL, COMPREHENSIVE    Collection Time: 12/10/21  7:17 AM   Result Value Ref Range    Sodium 136 136 - 145 mmol/L    Potassium 4.2 3.5 - 5.1 mmol/L    Chloride 109 (H) 97 - 108 mmol/L    CO2 22 21 - 32 mmol/L    Anion gap 5 5 - 15 mmol/L    Glucose 89 65 - 100 mg/dL    BUN 19 6 - 20 mg/dL    Creatinine 0.72 0.55 - 1.02 mg/dL    BUN/Creatinine ratio 26 (H) 12 - 20      GFR est AA >60 >60 ml/min/1.73m2    GFR est non-AA >60 >60 ml/min/1.73m2    Calcium 8.0 (L) 8.5 - 10.1 mg/dL    Bilirubin, total 3.2 (H) 0.2 - 1.0 mg/dL    AST (SGOT) 271 (H) 15 - 37 U/L    ALT (SGPT) 246 (H) 12 - 78 U/L    Alk.  phosphatase 148 (H) 45 - 117 U/L    Protein, total 6.9 6.4 - 8.2 g/dL    Albumin 1.5 (L) 3.5 - 5.0 g/dL    Globulin 5.4 (H) 2.0 - 4.0 g/dL    A-G Ratio 0.3 (L) 1.1 - 2.2     HGB & HCT    Collection Time: 12/10/21 8:16 AM   Result Value Ref Range    HGB 11.0 (L) 11.5 - 16.0 g/dL    HCT 32.2 (L) 35.0 - 47.0 %         Discharge Exam:  Visit Vitals  /76 (BP 1 Location: Left upper arm, BP Patient Position: At rest)   Pulse 91   Temp 98 °F (36.7 °C)   Resp 18   Ht 5' (1.524 m)   Wt 58.5 kg (129 lb)   SpO2 98%   BMI 25.19 kg/m²     General:  Alert, cooperative, no distress, appears stated age. Head:  Normocephalic, without obvious abnormality, atraumatic. Eyes:  Conjunctivae/corneas clear. PERRL, EOMs intact. Fundi benign. Ears:  Normal TMs and external ear canals both ears. Nose: Nares normal. Septum midline. Mucosa normal. No drainage or sinus tenderness. Throat: Lips, mucosa, and tongue normal. Teeth and gums normal.   Neck: Supple, symmetrical, trachea midline, no adenopathy, thyroid: no enlargement/tenderness/nodules, no carotid bruit and no JVD. Back:   Symmetric, no curvature. ROM normal. No CVA tenderness. Lungs:   Clear to auscultation bilaterally. Chest wall:  No tenderness or deformity. Heart:  Regular rate and rhythm, S1, S2 normal, no murmur, click, rub or gallop. Breast Exam:  No tenderness, masses, or nipple abnormality. Abdomen:   Soft, non-tender. Bowel sounds normal. No masses,  No organomegaly. Genitalia:  Normal female without lesion, discharge or tenderness. Rectal:  Normal tone,  no masses or tenderness  Guaiac negative stool. Extremities: Extremities normal, atraumatic, no cyanosis or edema. Pulses: 2+ and symmetric all extremities. Skin: Skin color, texture, turgor normal. No rashes or lesions. Lymph nodes: Cervical, supraclavicular, and axillary nodes normal.   Neurologic: CNII-XII intact. Normal strength, sensation and reflexes throughout. * Discharge Condition: good  * Disposition: Home    Discharge Medications:  Current Discharge Medication List          * Follow-up Care/Patient Instructions:   Activity: Activity as tolerated  Diet: Resume previous diet  Wound Care: None needed  Discharge if okay with GI  Patient has been advised to follow-up with the hepatologist at [facility] which she acknowledges she will do as soon as possible  Follow-up Information    None         Signed:  Eduardo Willett MD  12/10/2021  10:37 AM .

## 2021-12-10 NOTE — PROGRESS NOTES
Problem: Falls - Risk of  Goal: *Absence of Falls  Description: Document Berry Rojas Fall Risk and appropriate interventions in the flowsheet.   Note: Fall Risk Interventions:  Mobility Interventions: Bed/chair exit alarm         Medication Interventions: Bed/chair exit alarm         History of Falls Interventions: Bed/chair exit alarm

## 2021-12-10 NOTE — PROGRESS NOTES
CM discussed discharge plans with patient and . Patient will discharge home with  transport. Patient able to complete ADL/IADL care.  will assist as needed.

## 2021-12-10 NOTE — MED STUDENT NOTES
Medical Progress Note      NAME: Mary Horton   :  1945  MRM:  [id number]    Date/Time: 12/10/2021  10:08 AM         Subjective:   Ms. Татьяна Tomlin is doing much better, she is in good spirits and denies any compliants such as fever, diahreaa, constipation, nausea, vomitting, dizziness, chest pain, palpitations and shortness of breath. Waiting on recent H/H labs, EGD shows hiatal hernia at the level of the esophagus, and a 3cm large deep ulcer at the level of the esophagus above the hiatal hernia. Past Medical History reviewed and unchanged from Admission History and Physical    Review of Systems   Constitutional: Negative. HENT: Negative. Eyes: Negative. Respiratory: Negative. Cardiovascular: Negative. Gastrointestinal: Negative. Endocrine: Negative. Genitourinary: Negative. Musculoskeletal: Negative. Skin: Positive for pallor. Allergic/Immunologic: Negative. Neurological: Negative. Hematological: Negative. Psychiatric/Behavioral: Negative. Objective:       Vitals:      Last 24hrs VS reviewed since prior progress note. Most recent are:    Visit Vitals  /76 (BP 1 Location: Left upper arm, BP Patient Position: At rest)   Pulse 91   Temp 98 °F (36.7 °C)   Resp 18   Ht 5' (1.524 m)   Wt 129 lb (58.5 kg)   SpO2 98%   BMI 25.19 kg/m²     SpO2 Readings from Last 6 Encounters:   12/10/21 98%   21 96%            Intake/Output Summary (Last 24 hours) at 12/10/2021 1008  Last data filed at 2021 1300  Gross per 24 hour   Intake 400 ml   Output --   Net 400 ml          Exam:      Physical Exam  Vitals and nursing note reviewed. Constitutional:       General: She is awake. Appearance: Normal appearance. She is well-developed and well-groomed. Pulmonary:      Effort: Pulmonary effort is normal.   Skin:     General: Skin is warm and dry. Coloration: Skin is pale (patient is anemic). Neurological:      General: No focal deficit present. Mental Status: She is alert and oriented to person, place, and time. Mental status is at baseline. Cranial Nerves: Cranial nerves are intact. Psychiatric:         Attention and Perception: Attention and perception normal.         Mood and Affect: Mood normal.         Speech: Speech normal.         Behavior: Behavior normal. Behavior is cooperative. Thought Content: Thought content normal.         Cognition and Memory: Cognition normal.         Judgment: Judgment normal.             Lab Data Reviewed: (see below)      Medications:  Current Facility-Administered Medications   Medication Dose Route Frequency    famotidine (PEPCID) tablet 20 mg  20 mg Oral BID    ondansetron (ZOFRAN) injection 4 mg  4 mg IntraVENous Q4H PRN    melatonin tablet 3 mg  3 mg Oral QHS PRN    0.9% sodium chloride infusion  75 mL/hr IntraVENous PRN    cefTRIAXone (ROCEPHIN) 1 g in sterile water (preservative free) 10 mL IV syringe  1 g IntraVENous Q24H    sodium chloride (NS) flush 5-40 mL  5-40 mL IntraVENous PRN    acetaminophen (TYLENOL) tablet 650 mg  650 mg Oral Q6H PRN    Or    acetaminophen (TYLENOL) suppository 650 mg  650 mg Rectal Q6H PRN    polyethylene glycol (MIRALAX) packet 17 g  17 g Oral DAILY PRN    ondansetron (ZOFRAN ODT) tablet 4 mg  4 mg Oral Q8H PRN    Or    ondansetron (ZOFRAN) injection 4 mg  4 mg IntraVENous Q6H PRN    hydrOXYchloroQUINE (PLAQUENIL) tablet 200 mg  200 mg Oral DAILY WITH BREAKFAST    0.9% sodium chloride infusion 250 mL  250 mL IntraVENous PRN       ______________________________________________________________________      Lab Review:     Recent Labs     12/10/21  0816 12/09/21 2014 12/09/21  0545 12/07/21 2223 12/07/21  1315   WBC  --   --   --   --  18.3*   HGB 11.0* 11.7 11.4*   < > 10.0*   HCT 32.2* 34.6* 33.2*   < > 30.5*   PLT  --   --   --   --  208    < > = values in this interval not displayed.      Recent Labs     12/10/21  0717 12/09/21  0545 12/08/21  0419  139 132*   K 4.2 4.0 4.3   * 109* 99   CO2 22 24 27   GLU 89 107* 151*   BUN 19 27* 41*   CREA 0.72 0.75 0.86   CA 8.0* 8.6 8.7   ALB 1.5* 1.7* 1.6*   * 320* 328*   INR  --  1.7* 1.8*     No components found for: GLPOC  No results for input(s): PH, PCO2, PO2, HCO3, FIO2 in the last 72 hours. Recent Labs     12/09/21  0545 12/08/21  0419   INR 1.7* 1.8*                Assessment:     Active Problems:    GI bleed (12/7/2021)           Plan:         1. Continue to monitor, start on PPI for gastritis, plan for discharge and schedule outpatient appointments with PCP, and GI.             ___________________________________________________    Medical Student: Talon Colby         *ATTENTION:  This note has been created by a medical student for educational purposes only. Please do not refer to the content of this note for clinical decision-making, billing, or other purposes. Please see attending physicians note to obtain clinical information on this patient. *

## 2021-12-10 NOTE — PROGRESS NOTES
Progress Note    Patient: Navarro Patel MRN: [id number]  SSN: [id number]    YOB: 1945  Age: 77 y.o.   Sex: female      Admit Date: 12/7/2021    LOS: 3 days     Subjective:   Patient examined, seen, no hematemesis,nausea, no vomiting, no abdominal pain,  Has been discharged by admitting physician  Past Medical History:   Diagnosis Date    Diabetes ([facility]     Elevated liver enzymes     Hypertension     Lupus ([facility]         Current Facility-Administered Medications:     famotidine (PEPCID) tablet 20 mg, 20 mg, Oral, BID, Mickey Montana MD, 20 mg at 12/10/21 0836    ondansetron (ZOFRAN) injection 4 mg, 4 mg, IntraVENous, Q4H PRN, Raymon Gutiérrez MD    melatonin tablet 3 mg, 3 mg, Oral, QHS PRN, Terry CORONADO MD    0.9% sodium chloride infusion, 75 mL/hr, IntraVENous, PRN, Terry CORONADO MD, Stopped at 12/09/21 0000    cefTRIAXone (ROCEPHIN) 1 g in sterile water (preservative free) 10 mL IV syringe, 1 g, IntraVENous, Q24H, Raymon Carrasco MD, 1 g at 12/09/21 2120    sodium chloride (NS) flush 5-40 mL, 5-40 mL, IntraVENous, PRN, Terry CORONADO MD    acetaminophen (TYLENOL) tablet 650 mg, 650 mg, Oral, Q6H PRN **OR** acetaminophen (TYLENOL) suppository 650 mg, 650 mg, Rectal, Q6H PRN, Terry CORONADO MD    polyethylene glycol (MIRALAX) packet 17 g, 17 g, Oral, DAILY PRN, Terry CORONADO MD    ondansetron (ZOFRAN ODT) tablet 4 mg, 4 mg, Oral, Q8H PRN **OR** ondansetron (ZOFRAN) injection 4 mg, 4 mg, IntraVENous, Q6H PRN, Raymon Gutiérrez MD    hydrOXYchloroQUINE (PLAQUENIL) tablet 200 mg, 200 mg, Oral, DAILY WITH BREAKFAST, Raymon Carrasco MD, 200 mg at 12/10/21 0836    0.9% sodium chloride infusion 250 mL, 250 mL, IntraVENous, PRN, Terry CORONADO MD    Objective:     Vitals:    12/09/21 1908 12/09/21 2337 12/10/21 0820 12/10/21 1548   BP: (!) 132/92 (!) 152/78 129/76 125/71   Pulse:   91 (!) 111   Resp: 20 20 18 16   Temp: 97.6 °F (36.4 °C) 98.1 °F (36.7 °C) 98 °F (36.7 °C) 97.5 °F (36.4 °C)   SpO2: 100% 100% 98% 97%   Weight:       Height:            Intake and Output:  Current Shift: No intake/output data recorded. Last three shifts: 12/08 1901 - 12/10 0700  In: 410 [P.O.:400; I.V.:10]  Out: -     Physical Exam:   Physical Exam  HENT:      Head: Atraumatic. Mouth/Throat:      Mouth: Mucous membranes are dry. Eyes:      General:         Left eye: No discharge. Cardiovascular:      Rate and Rhythm: Regular rhythm. Heart sounds: Normal heart sounds. Pulmonary:      Effort: Pulmonary effort is normal.      Breath sounds: Normal breath sounds. Abdominal:      General: Abdomen is flat. Tenderness: There is no abdominal tenderness. Hernia: No hernia is present. Skin:     General: Skin is warm. Neurological:      General: No focal deficit present. Mental Status: Mental status is at baseline. Sensory: No sensory deficit. Motor: No weakness. Coordination: Coordination normal.   Psychiatric:         Mood and Affect: Mood normal.          Lab/Data Review:  Recent Results (from the past 24 hour(s))   HGB & HCT    Collection Time: 12/09/21  8:14 PM   Result Value Ref Range    HGB 11.7 11.5 - 16.0 g/dL    HCT 34.6 (L) 35.0 - 49.5 %   METABOLIC PANEL, COMPREHENSIVE    Collection Time: 12/10/21  7:17 AM   Result Value Ref Range    Sodium 136 136 - 145 mmol/L    Potassium 4.2 3.5 - 5.1 mmol/L    Chloride 109 (H) 97 - 108 mmol/L    CO2 22 21 - 32 mmol/L    Anion gap 5 5 - 15 mmol/L    Glucose 89 65 - 100 mg/dL    BUN 19 6 - 20 mg/dL    Creatinine 0.72 0.55 - 1.02 mg/dL    BUN/Creatinine ratio 26 (H) 12 - 20      GFR est AA >60 >60 ml/min/1.73m2    GFR est non-AA >60 >60 ml/min/1.73m2    Calcium 8.0 (L) 8.5 - 10.1 mg/dL    Bilirubin, total 3.2 (H) 0.2 - 1.0 mg/dL    AST (SGOT) 271 (H) 15 - 37 U/L    ALT (SGPT) 246 (H) 12 - 78 U/L    Alk.  phosphatase 148 (H) 45 - 117 U/L    Protein, total 6.9 6.4 - 8.2 g/dL    Albumin 1.5 (L) 3.5 - 5.0 g/dL Globulin 5.4 (H) 2.0 - 4.0 g/dL    A-G Ratio 0.3 (L) 1.1 - 2.2     HGB & HCT    Collection Time: 12/10/21  8:16 AM   Result Value Ref Range    HGB 11.0 (L) 11.5 - 16.0 g/dL    HCT 32.2 (L) 35.0 - 47.0 %        XR CHEST PA LAT   Final Result   No acute cardiopulmonary abnormality. Melena, hematemesis,   large esophageal ulcer,            Acutely and chronically inflamed gastric cardia- type mucosa with   hemorrhage and reactive epithelial changes. No evidence of intestinal metaplasia or dysplasia. Talked with Dr. Dr. Elaine Henao pathologist,  he was sent sample for CMV infection stain     hemoglobin stable, no new episode of bleeding, may go home today      Long  autoimmune hepatitis, chronic, possible liver failure, with slight Improvement PT/INR   MELD score 20, unknown baseline score, stable in last 3 daysHistory of lupus, increased INR from antiphospholipid syndrome not necessary from liver failure.     Jaundice, likely from the intrahepatic cholestasis, small bile duct inflammatory process,  part of autoimmune hepatc  disease,  No extrahepatic bile obstruction, based on the ultrasound MRI which performed earlier this year     Plan:   Continue on soft diet  Continue po Protonix  Follow-up with primary care primary, primary gastroenterologist in 1 week,   may need to go back to  budesonide soon if no signs of the CMV infection             Signed By: Ashwini Limon MD     December 10, 2021        Thank you for allowing me to participate in this patients care  Cc Referring Physician   Daniel Altamirano NP

## 2021-12-10 NOTE — PROGRESS NOTES
Pt transferred to room 562 after report given to [facility]. Pt has patent IVL in right AC. Belongings sent with pt and family went with pt to room.

## 2021-12-10 NOTE — PROGRESS NOTES
Discussed discharge instructions with patient including medications and follow up appointments. Patient verbalized understanding. Primary nurse at bedside and aware of discharge. Patient discharged via wheelchair with  to transport home. Discharge plan of care/case management plan validated with provider discharge order.

## 2021-12-30 NOTE — PERIOP NOTES
Patient name stated on voicemail; Left generalized message regarding COVID requirements, a COVID test needs to be completed in order to proceed with procedures at [facility]. Left message regarding curbside COVID swabbing at [facility] Monday, January 3rd from [phone]. Call [facility] Endoscopy at [phone] Monday thru Friday with questions or concerns.

## 2022-01-01 ENCOUNTER — APPOINTMENT (OUTPATIENT)
Dept: MRI IMAGING | Age: 77
DRG: 871 | End: 2022-01-01
Attending: PSYCHIATRY & NEUROLOGY
Payer: MEDICARE

## 2022-01-01 ENCOUNTER — TELEPHONE (OUTPATIENT)
Dept: HEMATOLOGY | Age: 77
End: 2022-01-01

## 2022-01-01 ENCOUNTER — APPOINTMENT (OUTPATIENT)
Dept: GENERAL RADIOLOGY | Age: 77
DRG: 871 | End: 2022-01-01
Attending: PSYCHIATRY & NEUROLOGY
Payer: MEDICARE

## 2022-01-01 ENCOUNTER — TELEPHONE (OUTPATIENT)
Dept: NEUROLOGY | Age: 77
End: 2022-01-01

## 2022-01-01 ENCOUNTER — HOSPITAL ENCOUNTER (OUTPATIENT)
Dept: LAB | Age: 77
Discharge: HOME | End: 2022-11-02

## 2022-01-01 ENCOUNTER — HOSPITAL ENCOUNTER (OUTPATIENT)
Dept: LAB | Age: 77
Discharge: HOME | End: 2022-11-05

## 2022-01-01 ENCOUNTER — APPOINTMENT (OUTPATIENT)
Dept: GENERAL RADIOLOGY | Age: 77
DRG: 871 | End: 2022-01-01
Attending: STUDENT IN AN ORGANIZED HEALTH CARE EDUCATION/TRAINING PROGRAM
Payer: MEDICARE

## 2022-01-01 ENCOUNTER — HOSPITAL ENCOUNTER (OUTPATIENT)
Dept: LAB | Age: 77
Discharge: HOME | End: 2022-10-31

## 2022-01-01 ENCOUNTER — HOSPITAL ENCOUNTER (INPATIENT)
Age: 77
LOS: 9 days | Discharge: NURSING, REHAB OR LONG-TERM CARE | DRG: 871 | End: 2022-10-27
Attending: STUDENT IN AN ORGANIZED HEALTH CARE EDUCATION/TRAINING PROGRAM | Admitting: INTERNAL MEDICINE
Payer: MEDICARE

## 2022-01-01 VITALS
BODY MASS INDEX: 22.58 KG/M2 | WEIGHT: 115 LBS | OXYGEN SATURATION: 100 % | SYSTOLIC BLOOD PRESSURE: 143 MMHG | TEMPERATURE: 97.8 F | RESPIRATION RATE: 18 BRPM | DIASTOLIC BLOOD PRESSURE: 75 MMHG | HEIGHT: 60 IN | HEART RATE: 95 BPM

## 2022-01-01 DIAGNOSIS — G61.0 GBS (GUILLAIN BARRE SYNDROME) (HCC): ICD-10-CM

## 2022-01-01 DIAGNOSIS — K75.4 AUTOIMMUNE HEPATITIS (HCC): ICD-10-CM

## 2022-01-01 DIAGNOSIS — E11.65 TYPE 2 DIABETES MELLITUS WITH HYPERGLYCEMIA, WITHOUT LONG-TERM CURRENT USE OF INSULIN (HCC): ICD-10-CM

## 2022-01-01 DIAGNOSIS — R53.83 MALAISE AND FATIGUE: ICD-10-CM

## 2022-01-01 DIAGNOSIS — R53.81 MALAISE AND FATIGUE: ICD-10-CM

## 2022-01-01 DIAGNOSIS — J18.9 PNEUMONIA OF BOTH LOWER LOBES DUE TO INFECTIOUS ORGANISM: ICD-10-CM

## 2022-01-01 DIAGNOSIS — R73.9 HYPERGLYCEMIA: ICD-10-CM

## 2022-01-01 DIAGNOSIS — R65.10 SIRS (SYSTEMIC INFLAMMATORY RESPONSE SYNDROME) (HCC): Primary | ICD-10-CM

## 2022-01-01 LAB
25(OH)D2 SERPL-MCNC: <1 NG/ML
25(OH)D3 SERPL-MCNC: 14 NG/ML
25(OH)D3+25(OH)D2 SERPL-MCNC: 14 NG/ML
ALBUMIN SERPL-MCNC: 1.9 G/DL (ref 3.5–5)
ALBUMIN SERPL-MCNC: 3.1 G/DL (ref 3.5–5)
ALBUMIN/GLOB SERPL: 0.4 {RATIO} (ref 1.1–2.2)
ALBUMIN/GLOB SERPL: 0.7 {RATIO} (ref 1.1–2.2)
ALP SERPL-CCNC: 167 U/L (ref 45–117)
ALP SERPL-CCNC: 169 U/L (ref 45–117)
ALT SERPL-CCNC: 77 U/L (ref 12–78)
ALT SERPL-CCNC: 89 U/L (ref 12–78)
AMMONIA PLAS-SCNC: 12 UMOL/L
ANION GAP SERPL CALC-SCNC: 12 MMOL/L (ref 5–15)
ANION GAP SERPL CALC-SCNC: 4 MMOL/L (ref 5–15)
ANION GAP SERPL CALC-SCNC: 5 MMOL/L (ref 5–15)
ANION GAP SERPL CALC-SCNC: 5 MMOL/L (ref 5–15)
ANION GAP SERPL CALC-SCNC: 6 MMOL/L (ref 5–15)
ANION GAP SERPL CALC-SCNC: 8 MMOL/L (ref 5–15)
APPEARANCE CSF: CLEAR
APPEARANCE UR: CLEAR
AST SERPL W P-5'-P-CCNC: 67 U/L (ref 15–37)
AST SERPL-CCNC: 38 U/L (ref 15–37)
ATRIAL RATE: 109 BPM
BACTERIA SPEC CULT: NORMAL
BACTERIA SPEC CULT: NORMAL
BACTERIA URNS QL MICRO: NEGATIVE /HPF
BASOPHILS # BLD: 0 K/UL (ref 0–0.1)
BASOPHILS # BLD: 0.1 K/UL (ref 0–0.1)
BASOPHILS NFR BLD: 0 % (ref 0–1)
BASOPHILS NFR BLD: 1 % (ref 0–1)
BILIRUB SERPL-MCNC: 1.1 MG/DL (ref 0.2–1)
BILIRUB SERPL-MCNC: 1.5 MG/DL (ref 0.2–1)
BILIRUB UR QL: NEGATIVE
BUN SERPL-MCNC: 24 MG/DL (ref 6–20)
BUN SERPL-MCNC: 26 MG/DL (ref 6–20)
BUN SERPL-MCNC: 28 MG/DL (ref 6–20)
BUN SERPL-MCNC: 29 MG/DL (ref 6–20)
BUN SERPL-MCNC: 36 MG/DL (ref 6–20)
BUN SERPL-MCNC: 37 MG/DL (ref 6–20)
BUN SERPL-MCNC: 38 MG/DL (ref 6–20)
BUN SERPL-MCNC: 41 MG/DL (ref 6–20)
BUN/CREAT SERPL: 28 (ref 12–20)
BUN/CREAT SERPL: 29 (ref 12–20)
BUN/CREAT SERPL: 34 (ref 12–20)
BUN/CREAT SERPL: 35 (ref 12–20)
BUN/CREAT SERPL: 39 (ref 12–20)
BUN/CREAT SERPL: 51 (ref 12–20)
BUN/CREAT SERPL: 51 (ref 12–20)
BUN/CREAT SERPL: 58 (ref 12–20)
CA-I BLD-MCNC: 8.6 MG/DL (ref 8.5–10.1)
CA-I BLD-MCNC: 8.9 MG/DL (ref 8.5–10.1)
CA-I BLD-MCNC: 9.1 MG/DL (ref 8.5–10.1)
CALCIUM SERPL-MCNC: 8.1 MG/DL (ref 8.5–10.1)
CALCIUM SERPL-MCNC: 8.5 MG/DL (ref 8.5–10.1)
CALCIUM SERPL-MCNC: 9.7 MG/DL (ref 8.5–10.1)
CALCULATED P AXIS, ECG09: 47 DEGREES
CALCULATED R AXIS, ECG10: -22 DEGREES
CALCULATED T AXIS, ECG11: 43 DEGREES
CHLORIDE SERPL-SCNC: 100 MMOL/L (ref 97–108)
CHLORIDE SERPL-SCNC: 103 MMOL/L (ref 97–108)
CHLORIDE SERPL-SCNC: 104 MMOL/L (ref 97–108)
CHLORIDE SERPL-SCNC: 104 MMOL/L (ref 97–108)
CHLORIDE SERPL-SCNC: 105 MMOL/L (ref 97–108)
CHLORIDE SERPL-SCNC: 106 MMOL/L (ref 97–108)
CHLORIDE SERPL-SCNC: 96 MMOL/L (ref 97–108)
CHLORIDE SERPL-SCNC: 99 MMOL/L (ref 97–108)
CO2 SERPL-SCNC: 18 MMOL/L (ref 21–32)
CO2 SERPL-SCNC: 19 MMOL/L (ref 21–32)
CO2 SERPL-SCNC: 20 MMOL/L (ref 21–32)
CO2 SERPL-SCNC: 22 MMOL/L (ref 21–32)
CO2 SERPL-SCNC: 23 MMOL/L (ref 21–32)
CO2 SERPL-SCNC: 25 MMOL/L (ref 21–32)
CO2 SERPL-SCNC: 27 MMOL/L (ref 21–32)
CO2 SERPL-SCNC: 28 MMOL/L (ref 21–32)
COLOR CSF: ABNORMAL
COLOR SPUN CSF: ABNORMAL
COLOR UR: ABNORMAL
COMMENT, HOLDF: NORMAL
CREAT SERPL-MCNC: 0.62 MG/DL (ref 0.55–1.02)
CREAT SERPL-MCNC: 0.65 MG/DL (ref 0.55–1.02)
CREAT SERPL-MCNC: 0.7 MG/DL (ref 0.55–1.02)
CREAT SERPL-MCNC: 0.81 MG/DL (ref 0.55–1.02)
CREAT SERPL-MCNC: 0.83 MG/DL (ref 0.55–1.02)
CREAT SERPL-MCNC: 0.84 MG/DL (ref 0.55–1.02)
CREAT SERPL-MCNC: 0.94 MG/DL (ref 0.55–1.02)
CREAT SERPL-MCNC: 1.28 MG/DL (ref 0.55–1.02)
CRYPTOCOCCUS NEOFORMANS/GATTII, CRNEOG: NOT DETECTED
CYTOMEGALOVIRUS, CYMEG: NOT DETECTED
DIAGNOSIS, 93000: NORMAL
DIFFERENTIAL METHOD BLD: ABNORMAL
ENTEROVIRUS, ENTVIR: NOT DETECTED
EOSINOPHIL # BLD: 0 K/UL (ref 0–0.4)
EOSINOPHIL # BLD: 0 K/UL (ref 0–0.4)
EOSINOPHIL # BLD: 0.1 K/UL (ref 0–0.4)
EOSINOPHIL # BLD: 0.2 K/UL (ref 0–0.4)
EOSINOPHIL # BLD: 0.3 K/UL (ref 0–0.4)
EOSINOPHIL NFR BLD: 0 % (ref 0–7)
EOSINOPHIL NFR BLD: 0 % (ref 0–7)
EOSINOPHIL NFR BLD: 1 % (ref 0–7)
EOSINOPHIL NFR BLD: 1 % (ref 0–7)
EOSINOPHIL NFR BLD: 2 % (ref 0–7)
EPITH CASTS URNS QL MICRO: ABNORMAL /LPF
ERYTHROCYTE [DISTWIDTH] IN BLOOD BY AUTOMATED COUNT: 14.6 % (ref 11.5–14.5)
ERYTHROCYTE [DISTWIDTH] IN BLOOD BY AUTOMATED COUNT: 14.6 % (ref 11.5–14.5)
ERYTHROCYTE [DISTWIDTH] IN BLOOD BY AUTOMATED COUNT: 14.7 % (ref 11.5–14.5)
ERYTHROCYTE [DISTWIDTH] IN BLOOD BY AUTOMATED COUNT: 14.7 % (ref 11.5–14.5)
ERYTHROCYTE [DISTWIDTH] IN BLOOD BY AUTOMATED COUNT: 14.9 % (ref 11.5–14.5)
ESCHERICHIA COLI K1, ECK1: NOT DETECTED
EST. AVERAGE GLUCOSE BLD GHB EST-MCNC: 189 MG/DL
FOLATE SERPL-MCNC: 12.1 NG/ML (ref 5–21)
GLOBULIN SER CALC-MCNC: 4.5 G/DL (ref 2–4)
GLOBULIN SER CALC-MCNC: 4.7 G/DL (ref 2–4)
GLUCOSE BLD STRIP.AUTO-MCNC: 102 MG/DL (ref 65–117)
GLUCOSE BLD STRIP.AUTO-MCNC: 106 MG/DL (ref 65–117)
GLUCOSE BLD STRIP.AUTO-MCNC: 118 MG/DL (ref 65–117)
GLUCOSE BLD STRIP.AUTO-MCNC: 129 MG/DL (ref 65–117)
GLUCOSE BLD STRIP.AUTO-MCNC: 143 MG/DL (ref 65–117)
GLUCOSE BLD STRIP.AUTO-MCNC: 158 MG/DL (ref 65–117)
GLUCOSE BLD STRIP.AUTO-MCNC: 162 MG/DL (ref 65–117)
GLUCOSE BLD STRIP.AUTO-MCNC: 164 MG/DL (ref 65–117)
GLUCOSE BLD STRIP.AUTO-MCNC: 164 MG/DL (ref 65–117)
GLUCOSE BLD STRIP.AUTO-MCNC: 176 MG/DL (ref 65–117)
GLUCOSE BLD STRIP.AUTO-MCNC: 185 MG/DL (ref 65–117)
GLUCOSE BLD STRIP.AUTO-MCNC: 187 MG/DL (ref 65–117)
GLUCOSE BLD STRIP.AUTO-MCNC: 191 MG/DL (ref 65–117)
GLUCOSE BLD STRIP.AUTO-MCNC: 205 MG/DL (ref 65–117)
GLUCOSE BLD STRIP.AUTO-MCNC: 207 MG/DL (ref 65–117)
GLUCOSE BLD STRIP.AUTO-MCNC: 212 MG/DL (ref 65–117)
GLUCOSE BLD STRIP.AUTO-MCNC: 239 MG/DL (ref 65–117)
GLUCOSE BLD STRIP.AUTO-MCNC: 247 MG/DL (ref 65–117)
GLUCOSE BLD STRIP.AUTO-MCNC: 249 MG/DL (ref 65–117)
GLUCOSE BLD STRIP.AUTO-MCNC: 254 MG/DL (ref 65–117)
GLUCOSE BLD STRIP.AUTO-MCNC: 258 MG/DL (ref 65–117)
GLUCOSE BLD STRIP.AUTO-MCNC: 260 MG/DL (ref 65–117)
GLUCOSE BLD STRIP.AUTO-MCNC: 264 MG/DL (ref 65–117)
GLUCOSE BLD STRIP.AUTO-MCNC: 265 MG/DL (ref 65–117)
GLUCOSE BLD STRIP.AUTO-MCNC: 302 MG/DL (ref 65–117)
GLUCOSE BLD STRIP.AUTO-MCNC: 307 MG/DL (ref 65–117)
GLUCOSE BLD STRIP.AUTO-MCNC: 307 MG/DL (ref 65–117)
GLUCOSE BLD STRIP.AUTO-MCNC: 333 MG/DL (ref 65–117)
GLUCOSE BLD STRIP.AUTO-MCNC: 350 MG/DL (ref 65–117)
GLUCOSE BLD STRIP.AUTO-MCNC: 393 MG/DL (ref 65–117)
GLUCOSE BLD STRIP.AUTO-MCNC: 412 MG/DL (ref 65–117)
GLUCOSE BLD STRIP.AUTO-MCNC: 457 MG/DL (ref 65–117)
GLUCOSE BLD STRIP.AUTO-MCNC: 81 MG/DL (ref 65–117)
GLUCOSE BLD STRIP.AUTO-MCNC: 82 MG/DL (ref 65–117)
GLUCOSE CSF-MCNC: 111 MG/DL (ref 40–70)
GLUCOSE SERPL-MCNC: 132 MG/DL (ref 65–100)
GLUCOSE SERPL-MCNC: 175 MG/DL (ref 65–100)
GLUCOSE SERPL-MCNC: 201 MG/DL (ref 65–100)
GLUCOSE SERPL-MCNC: 239 MG/DL (ref 65–100)
GLUCOSE SERPL-MCNC: 272 MG/DL (ref 65–100)
GLUCOSE SERPL-MCNC: 325 MG/DL (ref 65–100)
GLUCOSE SERPL-MCNC: 65 MG/DL (ref 65–100)
GLUCOSE SERPL-MCNC: 88 MG/DL (ref 65–100)
GLUCOSE UR STRIP.AUTO-MCNC: >1000 MG/DL
GRAM STN SPEC: NORMAL
GRAM STN SPEC: NORMAL
HAEMOPHILUS INFLUENZAE, HAEFLU: NOT DETECTED
HAV IGM SER QL: NONREACTIVE
HBA1C MFR BLD: 8.2 % (ref 4–5.6)
HBV CORE IGM SER QL: NONREACTIVE
HBV SURFACE AG SER QL: <0.1 INDEX
HBV SURFACE AG SER QL: NEGATIVE
HCT VFR BLD AUTO: 32.5 % (ref 35–47)
HCT VFR BLD AUTO: 34.8 % (ref 35–47)
HCT VFR BLD AUTO: 34.8 % (ref 35–47)
HCT VFR BLD AUTO: 35.7 % (ref 35–47)
HCT VFR BLD AUTO: 42.9 % (ref 35–47)
HCV AB SER IA-ACNC: 0.11 INDEX
HCV AB SERPL QL IA: NONREACTIVE
HERPES SIMPLEX VIRUS 2, HSIMV2: NOT DETECTED
HGB BLD-MCNC: 11.2 G/DL (ref 11.5–16)
HGB BLD-MCNC: 11.8 G/DL (ref 11.5–16)
HGB BLD-MCNC: 12.2 G/DL (ref 11.5–16)
HGB BLD-MCNC: 12.3 G/DL (ref 11.5–16)
HGB BLD-MCNC: 14.9 G/DL (ref 11.5–16)
HGB UR QL STRIP: ABNORMAL
HSV1 DNA CSF QL NAA+PROBE: NOT DETECTED
HUMAN HERPESVIRUS 6, HUHV6: NOT DETECTED
HUMAN PARECHOVIRUS, HUPARV: NOT DETECTED
HYALINE CASTS URNS QL MICRO: ABNORMAL /LPF (ref 0–5)
IGA SERPL-MCNC: 454 MG/DL (ref 70–400)
IMM GRANULOCYTES # BLD AUTO: 0 K/UL
IMM GRANULOCYTES # BLD AUTO: 0.2 K/UL (ref 0–0.04)
IMM GRANULOCYTES NFR BLD AUTO: 0 %
IMM GRANULOCYTES NFR BLD AUTO: 2 % (ref 0–0.5)
KETONES UR QL STRIP.AUTO: NEGATIVE MG/DL
LACTATE SERPL-SCNC: 2.1 MMOL/L (ref 0.4–2)
LACTATE SERPL-SCNC: 4.8 MMOL/L (ref 0.4–2)
LEUKOCYTE ESTERASE UR QL STRIP.AUTO: NEGATIVE
LISTERIA MONOCYTOGENES, LISMON: NOT DETECTED
LYMPHOCYTES # BLD: 0.4 K/UL (ref 0.8–3.5)
LYMPHOCYTES # BLD: 0.6 K/UL (ref 0.8–3.5)
LYMPHOCYTES # BLD: 0.9 K/UL (ref 0.8–3.5)
LYMPHOCYTES # BLD: 1 K/UL (ref 0.8–3.5)
LYMPHOCYTES # BLD: 1.2 K/UL (ref 0.8–3.5)
LYMPHOCYTES NFR BLD: 3 % (ref 12–49)
LYMPHOCYTES NFR BLD: 5 % (ref 12–49)
LYMPHOCYTES NFR BLD: 7 % (ref 12–49)
LYMPHOCYTES NFR BLD: 8 % (ref 12–49)
LYMPHOCYTES NFR BLD: 8 % (ref 12–49)
MAGNESIUM SERPL-MCNC: 1.5 MG/DL (ref 1.6–2.4)
MAGNESIUM SERPL-MCNC: 1.7 MG/DL (ref 1.6–2.4)
MAGNESIUM SERPL-MCNC: 1.7 MG/DL (ref 1.6–2.4)
MAGNESIUM SERPL-MCNC: 1.8 MG/DL (ref 1.6–2.4)
MAGNESIUM SERPL-MCNC: 1.9 MG/DL (ref 1.6–2.4)
MAGNESIUM SERPL-MCNC: 1.9 MG/DL (ref 1.6–2.4)
MCH RBC QN AUTO: 32.6 PG (ref 26–34)
MCH RBC QN AUTO: 32.6 PG (ref 26–34)
MCH RBC QN AUTO: 33.2 PG (ref 26–34)
MCH RBC QN AUTO: 33.2 PG (ref 26–34)
MCH RBC QN AUTO: 33.4 PG (ref 26–34)
MCHC RBC AUTO-ENTMCNC: 33.9 G/DL (ref 30–36.5)
MCHC RBC AUTO-ENTMCNC: 34.5 G/DL (ref 30–36.5)
MCHC RBC AUTO-ENTMCNC: 34.5 G/DL (ref 30–36.5)
MCHC RBC AUTO-ENTMCNC: 34.7 G/DL (ref 30–36.5)
MCHC RBC AUTO-ENTMCNC: 35.1 G/DL (ref 30–36.5)
MCV RBC AUTO: 93.9 FL (ref 80–99)
MCV RBC AUTO: 94.7 FL (ref 80–99)
MCV RBC AUTO: 95.3 FL (ref 80–99)
MCV RBC AUTO: 96.4 FL (ref 80–99)
MCV RBC AUTO: 98 FL (ref 80–99)
METAMYELOCYTES NFR BLD MANUAL: 1 %
MONOCYTES # BLD: 0.3 K/UL (ref 0–1)
MONOCYTES # BLD: 0.5 K/UL (ref 0–1)
MONOCYTES # BLD: 0.6 K/UL (ref 0–1)
MONOCYTES # BLD: 0.8 K/UL (ref 0–1)
MONOCYTES # BLD: 1.3 K/UL (ref 0–1)
MONOCYTES NFR BLD: 3 % (ref 5–13)
MONOCYTES NFR BLD: 4 % (ref 5–13)
MONOCYTES NFR BLD: 4 % (ref 5–13)
MONOCYTES NFR BLD: 7 % (ref 5–13)
MONOCYTES NFR BLD: 9 % (ref 5–13)
MYELOCYTES NFR BLD MANUAL: 1 %
MYELOCYTES NFR BLD MANUAL: 1 %
MYELOCYTES NFR BLD MANUAL: 2 %
NEISSERIA MENINGITIDIS, NEIMEN: NOT DETECTED
NEUTS BAND NFR BLD MANUAL: 3 % (ref 0–6)
NEUTS SEG # BLD: 10.2 K/UL (ref 1.8–8)
NEUTS SEG # BLD: 10.8 K/UL (ref 1.8–8)
NEUTS SEG # BLD: 11.8 K/UL (ref 1.8–8)
NEUTS SEG # BLD: 12.9 K/UL (ref 1.8–8)
NEUTS SEG # BLD: 9.1 K/UL (ref 1.8–8)
NEUTS SEG NFR BLD: 77 % (ref 32–75)
NEUTS SEG NFR BLD: 81 % (ref 32–75)
NEUTS SEG NFR BLD: 86 % (ref 32–75)
NEUTS SEG NFR BLD: 90 % (ref 32–75)
NEUTS SEG NFR BLD: 93 % (ref 32–75)
NITRITE UR QL STRIP.AUTO: NEGATIVE
NRBC # BLD: 0 K/UL (ref 0–0.01)
NRBC BLD-RTO: 0 PER 100 WBC
P-R INTERVAL, ECG05: 136 MS
PH UR STRIP: 5 [PH] (ref 5–8)
PHOSPHATE SERPL-MCNC: 1.9 MG/DL (ref 2.6–4.7)
PHOSPHATE SERPL-MCNC: 2.2 MG/DL (ref 2.6–4.7)
PHOSPHATE SERPL-MCNC: 2.3 MG/DL (ref 2.6–4.7)
PHOSPHATE SERPL-MCNC: 2.4 MG/DL (ref 2.6–4.7)
PHOSPHATE SERPL-MCNC: 2.4 MG/DL (ref 2.6–4.7)
PHOSPHATE SERPL-MCNC: 2.9 MG/DL (ref 2.6–4.7)
PHOSPHATE SERPL-MCNC: 3.1 MG/DL (ref 2.6–4.7)
PLATELET # BLD AUTO: 101 K/UL (ref 150–400)
PLATELET # BLD AUTO: 101 K/UL (ref 150–400)
PLATELET # BLD AUTO: 105 K/UL (ref 150–400)
PLATELET # BLD AUTO: 158 K/UL (ref 150–400)
PLATELET # BLD AUTO: 94 K/UL (ref 150–400)
PMV BLD AUTO: 10.3 FL (ref 8.9–12.9)
PMV BLD AUTO: 9.8 FL (ref 8.9–12.9)
PMV BLD AUTO: 9.8 FL (ref 8.9–12.9)
PMV BLD AUTO: 9.9 FL (ref 8.9–12.9)
PMV BLD AUTO: 9.9 FL (ref 8.9–12.9)
POTASSIUM SERPL-SCNC: 4.4 MMOL/L (ref 3.5–5.1)
POTASSIUM SERPL-SCNC: 4.5 MMOL/L (ref 3.5–5.1)
POTASSIUM SERPL-SCNC: 4.7 MMOL/L (ref 3.5–5.1)
POTASSIUM SERPL-SCNC: 4.8 MMOL/L (ref 3.5–5.1)
POTASSIUM SERPL-SCNC: 4.8 MMOL/L (ref 3.5–5.1)
POTASSIUM SERPL-SCNC: 4.9 MMOL/L (ref 3.5–5.1)
POTASSIUM SERPL-SCNC: 4.9 MMOL/L (ref 3.5–5.1)
POTASSIUM SERPL-SCNC: 5 MMOL/L (ref 3.5–5.1)
PROT CSF-MCNC: 107 MG/DL (ref 15–45)
PROT SERPL-MCNC: 6.6 G/DL (ref 6.4–8.2)
PROT SERPL-MCNC: 7.6 G/DL (ref 6.4–8.2)
PROT UR STRIP-MCNC: NEGATIVE MG/DL
Q-T INTERVAL, ECG07: 310 MS
QRS DURATION, ECG06: 76 MS
QTC CALCULATION (BEZET), ECG08: 417 MS
RBC # BLD AUTO: 3.37 M/UL (ref 3.8–5.2)
RBC # BLD AUTO: 3.55 M/UL (ref 3.8–5.2)
RBC # BLD AUTO: 3.65 M/UL (ref 3.8–5.2)
RBC # BLD AUTO: 3.77 M/UL (ref 3.8–5.2)
RBC # BLD AUTO: 4.57 M/UL (ref 3.8–5.2)
RBC # CSF: 49 /CU MM
RBC #/AREA URNS HPF: ABNORMAL /HPF (ref 0–5)
RBC MORPH BLD: ABNORMAL
SAMPLES BEING HELD,HOLD: NORMAL
SERVICE CMNT-IMP: ABNORMAL
SERVICE CMNT-IMP: NORMAL
SODIUM SERPL-SCNC: 128 MMOL/L (ref 136–145)
SODIUM SERPL-SCNC: 130 MMOL/L (ref 136–145)
SODIUM SERPL-SCNC: 130 MMOL/L (ref 136–145)
SODIUM SERPL-SCNC: 131 MMOL/L (ref 136–145)
SODIUM SERPL-SCNC: 132 MMOL/L (ref 136–145)
SODIUM SERPL-SCNC: 133 MMOL/L (ref 136–145)
SODIUM SERPL-SCNC: 133 MMOL/L (ref 136–145)
SODIUM SERPL-SCNC: 134 MMOL/L (ref 136–145)
SP GR UR REFRACTOMETRY: 1.03 (ref 1–1.03)
SP1: NORMAL
SP2: NORMAL
SP3: NORMAL
STREPTOCOCCUS AGALACTIAE, SAGA: NOT DETECTED
STREPTOCOCCUS PNEUMONIAE, STRPNE: NOT DETECTED
TROPONIN-HIGH SENSITIVITY: 48 NG/L (ref 0–51)
TROPONIN-HIGH SENSITIVITY: 56 NG/L (ref 0–51)
TSH SERPL DL<=0.05 MIU/L-ACNC: 2.11 UIU/ML (ref 0.36–3.74)
TUBE # CSF: 1
UR CULT HOLD, URHOLD: NORMAL
UROBILINOGEN UR QL STRIP.AUTO: 0.2 EU/DL (ref 0.2–1)
VARICELLA ZOSTER VIRUS, VAZOVI: NOT DETECTED
VENTRICULAR RATE, ECG03: 109 BPM
VIT B12 SERPL-MCNC: 753 PG/ML (ref 232–1245)
VIT B12 SERPL-MCNC: 797 PG/ML (ref 193–986)
VITAMIN B12 DEFICIENCY INTERPRETATION, RINT1T: NORMAL
WBC # BLD AUTO: 11.2 K/UL (ref 3.6–11)
WBC # BLD AUTO: 11.3 K/UL (ref 3.6–11)
WBC # BLD AUTO: 11.7 K/UL (ref 3.6–11)
WBC # BLD AUTO: 14.7 K/UL (ref 3.6–11)
WBC # BLD AUTO: 15 K/UL (ref 3.6–11)
WBC # CSF: 0 /CU MM (ref 0–5)
WBC URNS QL MICRO: ABNORMAL /HPF (ref 0–4)

## 2022-01-01 PROCEDURE — 80074 ACUTE HEPATITIS PANEL: CPT

## 2022-01-01 PROCEDURE — 85025 COMPLETE CBC W/AUTO DIFF WBC: CPT

## 2022-01-01 PROCEDURE — 74011636637 HC RX REV CODE- 636/637: Performed by: INTERNAL MEDICINE

## 2022-01-01 PROCEDURE — 82962 GLUCOSE BLOOD TEST: CPT

## 2022-01-01 PROCEDURE — 74011250636 HC RX REV CODE- 250/636: Performed by: INTERNAL MEDICINE

## 2022-01-01 PROCEDURE — 83735 ASSAY OF MAGNESIUM: CPT

## 2022-01-01 PROCEDURE — 36415 COLL VENOUS BLD VENIPUNCTURE: CPT

## 2022-01-01 PROCEDURE — 80053 COMPREHEN METABOLIC PANEL: CPT

## 2022-01-01 PROCEDURE — 74011250636 HC RX REV CODE- 250/636: Performed by: PSYCHIATRY & NEUROLOGY

## 2022-01-01 PROCEDURE — 82945 GLUCOSE OTHER FLUID: CPT

## 2022-01-01 PROCEDURE — 84100 ASSAY OF PHOSPHORUS: CPT

## 2022-01-01 PROCEDURE — 99232 SBSQ HOSP IP/OBS MODERATE 35: CPT | Performed by: NURSE PRACTITIONER

## 2022-01-01 PROCEDURE — 65270000029 HC RM PRIVATE

## 2022-01-01 PROCEDURE — 74011000250 HC RX REV CODE- 250: Performed by: INTERNAL MEDICINE

## 2022-01-01 PROCEDURE — 74011250637 HC RX REV CODE- 250/637: Performed by: STUDENT IN AN ORGANIZED HEALTH CARE EDUCATION/TRAINING PROGRAM

## 2022-01-01 PROCEDURE — 80048 BASIC METABOLIC PNL TOTAL CA: CPT

## 2022-01-01 PROCEDURE — 97535 SELF CARE MNGMENT TRAINING: CPT

## 2022-01-01 PROCEDURE — 94760 N-INVAS EAR/PLS OXIMETRY 1: CPT

## 2022-01-01 PROCEDURE — 99233 SBSQ HOSP IP/OBS HIGH 50: CPT | Performed by: CLINICAL NURSE SPECIALIST

## 2022-01-01 PROCEDURE — 97530 THERAPEUTIC ACTIVITIES: CPT

## 2022-01-01 PROCEDURE — 77030038269 HC DRN EXT URIN PURWCK BARD -A

## 2022-01-01 PROCEDURE — 99231 SBSQ HOSP IP/OBS SF/LOW 25: CPT | Performed by: CLINICAL NURSE SPECIALIST

## 2022-01-01 PROCEDURE — 84443 ASSAY THYROID STIM HORMONE: CPT

## 2022-01-01 PROCEDURE — 96365 THER/PROPH/DIAG IV INF INIT: CPT

## 2022-01-01 PROCEDURE — 99223 1ST HOSP IP/OBS HIGH 75: CPT | Performed by: PSYCHIATRY & NEUROLOGY

## 2022-01-01 PROCEDURE — 74011250636 HC RX REV CODE- 250/636: Performed by: STUDENT IN AN ORGANIZED HEALTH CARE EDUCATION/TRAINING PROGRAM

## 2022-01-01 PROCEDURE — 74011636637 HC RX REV CODE- 636/637: Performed by: STUDENT IN AN ORGANIZED HEALTH CARE EDUCATION/TRAINING PROGRAM

## 2022-01-01 PROCEDURE — 99233 SBSQ HOSP IP/OBS HIGH 50: CPT | Performed by: PSYCHIATRY & NEUROLOGY

## 2022-01-01 PROCEDURE — 74011250637 HC RX REV CODE- 250/637: Performed by: INTERNAL MEDICINE

## 2022-01-01 PROCEDURE — 97116 GAIT TRAINING THERAPY: CPT

## 2022-01-01 PROCEDURE — 87483 CNS DNA AMP PROBE TYPE 12-25: CPT

## 2022-01-01 PROCEDURE — 74011000250 HC RX REV CODE- 250: Performed by: STUDENT IN AN ORGANIZED HEALTH CARE EDUCATION/TRAINING PROGRAM

## 2022-01-01 PROCEDURE — 009U3ZX DRAINAGE OF SPINAL CANAL, PERCUTANEOUS APPROACH, DIAGNOSTIC: ICD-10-PCS | Performed by: RADIOLOGY

## 2022-01-01 PROCEDURE — 76937 US GUIDE VASCULAR ACCESS: CPT

## 2022-01-01 PROCEDURE — 82140 ASSAY OF AMMONIA: CPT

## 2022-01-01 PROCEDURE — 81001 URINALYSIS AUTO W/SCOPE: CPT

## 2022-01-01 PROCEDURE — 74011636637 HC RX REV CODE- 636/637: Performed by: PHYSICIAN ASSISTANT

## 2022-01-01 PROCEDURE — 77030020365 HC SOL INJ SOD CL 0.9% 50ML

## 2022-01-01 PROCEDURE — 89050 BODY FLUID CELL COUNT: CPT

## 2022-01-01 PROCEDURE — 72158 MRI LUMBAR SPINE W/O & W/DYE: CPT

## 2022-01-01 PROCEDURE — 71046 X-RAY EXAM CHEST 2 VIEWS: CPT

## 2022-01-01 PROCEDURE — 87205 SMEAR GRAM STAIN: CPT

## 2022-01-01 PROCEDURE — 83605 ASSAY OF LACTIC ACID: CPT

## 2022-01-01 PROCEDURE — 97110 THERAPEUTIC EXERCISES: CPT

## 2022-01-01 PROCEDURE — 96375 TX/PRO/DX INJ NEW DRUG ADDON: CPT

## 2022-01-01 PROCEDURE — A9576 INJ PROHANCE MULTIPACK: HCPCS

## 2022-01-01 PROCEDURE — 77030014143 XR SPINAL PUNC LUMB DX

## 2022-01-01 PROCEDURE — 97165 OT EVAL LOW COMPLEX 30 MIN: CPT

## 2022-01-01 PROCEDURE — 82784 ASSAY IGA/IGD/IGG/IGM EACH: CPT

## 2022-01-01 PROCEDURE — 82746 ASSAY OF FOLIC ACID SERUM: CPT

## 2022-01-01 PROCEDURE — 84484 ASSAY OF TROPONIN QUANT: CPT

## 2022-01-01 PROCEDURE — 77030037878 HC DRSG MEPILEX >48IN BORD MOLN -B

## 2022-01-01 PROCEDURE — 82607 VITAMIN B-12: CPT

## 2022-01-01 PROCEDURE — C1751 CATH, INF, PER/CENT/MIDLINE: HCPCS

## 2022-01-01 PROCEDURE — 97161 PT EVAL LOW COMPLEX 20 MIN: CPT

## 2022-01-01 PROCEDURE — 84157 ASSAY OF PROTEIN OTHER: CPT

## 2022-01-01 PROCEDURE — 99285 EMERGENCY DEPT VISIT HI MDM: CPT

## 2022-01-01 PROCEDURE — 87040 BLOOD CULTURE FOR BACTERIA: CPT

## 2022-01-01 PROCEDURE — 82306 VITAMIN D 25 HYDROXY: CPT

## 2022-01-01 PROCEDURE — 65270000032 HC RM SEMIPRIVATE

## 2022-01-01 PROCEDURE — 83036 HEMOGLOBIN GLYCOSYLATED A1C: CPT

## 2022-01-01 PROCEDURE — 74011250636 HC RX REV CODE- 250/636

## 2022-01-01 PROCEDURE — 93005 ELECTROCARDIOGRAM TRACING: CPT

## 2022-01-01 RX ORDER — INSULIN GLARGINE 100 [IU]/ML
0.2 INJECTION, SOLUTION SUBCUTANEOUS DAILY
Status: DISCONTINUED | OUTPATIENT
Start: 2022-01-01 | End: 2022-01-01

## 2022-01-01 RX ORDER — MYCOPHENOLATE MOFETIL 250 MG/1
500 CAPSULE ORAL
Status: DISCONTINUED | OUTPATIENT
Start: 2022-01-01 | End: 2022-01-01 | Stop reason: HOSPADM

## 2022-01-01 RX ORDER — ONDANSETRON 4 MG/1
4 TABLET, ORALLY DISINTEGRATING ORAL
Status: CANCELLED | OUTPATIENT
Start: 2022-01-01

## 2022-01-01 RX ORDER — TRAMADOL HYDROCHLORIDE 50 MG/1
50 TABLET ORAL
Status: DISCONTINUED | OUTPATIENT
Start: 2022-01-01 | End: 2022-01-01 | Stop reason: HOSPADM

## 2022-01-01 RX ORDER — METFORMIN HYDROCHLORIDE 500 MG/1
500 TABLET ORAL
Status: SHIPPED | COMMUNITY
Start: 2022-01-01

## 2022-01-01 RX ORDER — SODIUM CHLORIDE 9 MG/ML
100 INJECTION, SOLUTION INTRAVENOUS CONTINUOUS
Status: DISCONTINUED | OUTPATIENT
Start: 2022-01-01 | End: 2022-01-01

## 2022-01-01 RX ORDER — INSULIN LISPRO 100 [IU]/ML
6 INJECTION, SOLUTION INTRAVENOUS; SUBCUTANEOUS
Status: COMPLETED | OUTPATIENT
Start: 2022-01-01 | End: 2022-01-01

## 2022-01-01 RX ORDER — SODIUM CHLORIDE 0.9 % (FLUSH) 0.9 %
10 SYRINGE (ML) INJECTION
Status: COMPLETED | OUTPATIENT
Start: 2022-01-01 | End: 2022-01-01

## 2022-01-01 RX ORDER — SODIUM CHLORIDE 0.9 % (FLUSH) 0.9 %
5-40 SYRINGE (ML) INJECTION EVERY 8 HOURS
Status: CANCELLED | OUTPATIENT
Start: 2022-01-01

## 2022-01-01 RX ORDER — SODIUM CHLORIDE 0.9 % (FLUSH) 0.9 %
5-40 SYRINGE (ML) INJECTION EVERY 8 HOURS
Status: DISCONTINUED | OUTPATIENT
Start: 2022-01-01 | End: 2022-01-01 | Stop reason: HOSPADM

## 2022-01-01 RX ORDER — SPIRONOLACTONE 100 MG/1
100 TABLET, FILM COATED ORAL DAILY
Status: DISCONTINUED | OUTPATIENT
Start: 2022-01-01 | End: 2022-01-01 | Stop reason: HOSPADM

## 2022-01-01 RX ORDER — HYDROXYCHLOROQUINE SULFATE 200 MG/1
200 TABLET, FILM COATED ORAL DAILY
Status: DISCONTINUED | OUTPATIENT
Start: 2022-01-01 | End: 2022-01-01

## 2022-01-01 RX ORDER — SODIUM CHLORIDE 0.9 % (FLUSH) 0.9 %
5-40 SYRINGE (ML) INJECTION AS NEEDED
Status: CANCELLED | OUTPATIENT
Start: 2022-01-01

## 2022-01-01 RX ORDER — SODIUM CHLORIDE 9 MG/ML
30 INJECTION, SOLUTION INTRAVENOUS ONCE
Status: COMPLETED | OUTPATIENT
Start: 2022-01-01 | End: 2022-01-01

## 2022-01-01 RX ORDER — ENOXAPARIN SODIUM 100 MG/ML
40 INJECTION SUBCUTANEOUS DAILY
Status: CANCELLED | OUTPATIENT
Start: 2022-01-01

## 2022-01-01 RX ORDER — INSULIN LISPRO 100 [IU]/ML
0.05 INJECTION, SOLUTION INTRAVENOUS; SUBCUTANEOUS
Status: DISCONTINUED | OUTPATIENT
Start: 2022-01-01 | End: 2022-01-01

## 2022-01-01 RX ORDER — ACETAMINOPHEN 650 MG/1
650 SUPPOSITORY RECTAL
Status: CANCELLED | OUTPATIENT
Start: 2022-01-01

## 2022-01-01 RX ORDER — INSULIN GLARGINE 100 [IU]/ML
20 INJECTION, SOLUTION SUBCUTANEOUS DAILY
Status: DISCONTINUED | OUTPATIENT
Start: 2022-01-01 | End: 2022-01-01 | Stop reason: HOSPADM

## 2022-01-01 RX ORDER — ACETAMINOPHEN 325 MG/1
650 TABLET ORAL
Status: CANCELLED | OUTPATIENT
Start: 2022-01-01

## 2022-01-01 RX ORDER — ACETAMINOPHEN 650 MG/1
650 SUPPOSITORY RECTAL
Status: DISCONTINUED | OUTPATIENT
Start: 2022-01-01 | End: 2022-01-01 | Stop reason: HOSPADM

## 2022-01-01 RX ORDER — SODIUM BICARBONATE 42 MG/ML
1 INJECTION, SOLUTION INTRAVENOUS
Status: ACTIVE | OUTPATIENT
Start: 2022-01-01 | End: 2022-01-01

## 2022-01-01 RX ORDER — SODIUM CHLORIDE 0.9 % (FLUSH) 0.9 %
5-40 SYRINGE (ML) INJECTION AS NEEDED
Status: DISCONTINUED | OUTPATIENT
Start: 2022-01-01 | End: 2022-01-01 | Stop reason: HOSPADM

## 2022-01-01 RX ORDER — INSULIN LISPRO 100 [IU]/ML
13 INJECTION, SOLUTION INTRAVENOUS; SUBCUTANEOUS ONCE
Status: COMPLETED | OUTPATIENT
Start: 2022-01-01 | End: 2022-01-01

## 2022-01-01 RX ORDER — POLYETHYLENE GLYCOL 3350 17 G/17G
17 POWDER, FOR SOLUTION ORAL DAILY PRN
Status: CANCELLED | OUTPATIENT
Start: 2022-01-01

## 2022-01-01 RX ORDER — LANOLIN ALCOHOL/MO/W.PET/CERES
6 CREAM (GRAM) TOPICAL
Status: DISCONTINUED | OUTPATIENT
Start: 2022-01-01 | End: 2022-01-01 | Stop reason: HOSPADM

## 2022-01-01 RX ORDER — HYDROXYCHLOROQUINE SULFATE 200 MG/1
200 TABLET, FILM COATED ORAL DAILY
COMMUNITY
End: 2022-01-01

## 2022-01-01 RX ORDER — INSULIN LISPRO 100 [IU]/ML
INJECTION, SOLUTION INTRAVENOUS; SUBCUTANEOUS
Status: DISCONTINUED | OUTPATIENT
Start: 2022-01-01 | End: 2022-01-01 | Stop reason: HOSPADM

## 2022-01-01 RX ORDER — LIDOCAINE HYDROCHLORIDE 20 MG/ML
5 INJECTION, SOLUTION EPIDURAL; INFILTRATION; INTRACAUDAL; PERINEURAL
Status: ACTIVE | OUTPATIENT
Start: 2022-01-01 | End: 2022-01-01

## 2022-01-01 RX ORDER — MAGNESIUM SULFATE 100 %
4 CRYSTALS MISCELLANEOUS AS NEEDED
Status: DISCONTINUED | OUTPATIENT
Start: 2022-01-01 | End: 2022-01-01 | Stop reason: HOSPADM

## 2022-01-01 RX ORDER — INSULIN GLARGINE 100 [IU]/ML
20 INJECTION, SOLUTION SUBCUTANEOUS DAILY
Status: DISCONTINUED | OUTPATIENT
Start: 2022-01-01 | End: 2022-01-01

## 2022-01-01 RX ORDER — INSULIN LISPRO 100 [IU]/ML
6 INJECTION, SOLUTION INTRAVENOUS; SUBCUTANEOUS
Status: DISCONTINUED | OUTPATIENT
Start: 2022-01-01 | End: 2022-01-01 | Stop reason: HOSPADM

## 2022-01-01 RX ORDER — ONDANSETRON 2 MG/ML
4 INJECTION INTRAMUSCULAR; INTRAVENOUS
Status: CANCELLED | OUTPATIENT
Start: 2022-01-01

## 2022-01-01 RX ORDER — INSULIN GLARGINE 100 [IU]/ML
INJECTION, SOLUTION SUBCUTANEOUS
Qty: 1 ML | Status: SHIPPED | COMMUNITY
Start: 2022-01-01

## 2022-01-01 RX ORDER — LIDOCAINE HYDROCHLORIDE 20 MG/ML
INJECTION, SOLUTION EPIDURAL; INFILTRATION; INTRACAUDAL; PERINEURAL
Status: DISPENSED
Start: 2022-01-01 | End: 2022-01-01

## 2022-01-01 RX ORDER — ONDANSETRON 2 MG/ML
4 INJECTION INTRAMUSCULAR; INTRAVENOUS
Status: DISCONTINUED | OUTPATIENT
Start: 2022-01-01 | End: 2022-01-01 | Stop reason: HOSPADM

## 2022-01-01 RX ORDER — TRAMADOL HYDROCHLORIDE 50 MG/1
50 TABLET ORAL
Qty: 12 TABLET | Refills: 0 | Status: SHIPPED | OUTPATIENT
Start: 2022-01-01 | End: 2022-01-01

## 2022-01-01 RX ORDER — POLYETHYLENE GLYCOL 3350 17 G/17G
17 POWDER, FOR SOLUTION ORAL DAILY PRN
Status: DISCONTINUED | OUTPATIENT
Start: 2022-01-01 | End: 2022-01-01 | Stop reason: HOSPADM

## 2022-01-01 RX ORDER — GLIMEPIRIDE 1 MG/1
1 TABLET ORAL
Status: SHIPPED | COMMUNITY
Start: 2022-01-01

## 2022-01-01 RX ORDER — BUDESONIDE 3 MG/1
9 CAPSULE, COATED PELLETS ORAL DAILY
Status: DISCONTINUED | OUTPATIENT
Start: 2022-01-01 | End: 2022-01-01 | Stop reason: HOSPADM

## 2022-01-01 RX ORDER — PANTOPRAZOLE SODIUM 40 MG/1
40 TABLET, DELAYED RELEASE ORAL DAILY
Status: DISCONTINUED | OUTPATIENT
Start: 2022-01-01 | End: 2022-01-01 | Stop reason: HOSPADM

## 2022-01-01 RX ORDER — ACETAMINOPHEN 325 MG/1
650 TABLET ORAL
Status: DISCONTINUED | OUTPATIENT
Start: 2022-01-01 | End: 2022-01-01 | Stop reason: HOSPADM

## 2022-01-01 RX ORDER — SODIUM BICARBONATE 42 MG/ML
INJECTION, SOLUTION INTRAVENOUS
Status: DISPENSED
Start: 2022-01-01 | End: 2022-01-01

## 2022-01-01 RX ORDER — PREGABALIN 50 MG/1
50 CAPSULE ORAL DAILY
Qty: 30 CAPSULE | Refills: 0 | Status: SHIPPED | OUTPATIENT
Start: 2022-01-01

## 2022-01-01 RX ORDER — FUROSEMIDE 40 MG/1
40 TABLET ORAL DAILY
Status: DISCONTINUED | OUTPATIENT
Start: 2022-01-01 | End: 2022-01-01

## 2022-01-01 RX ORDER — ONDANSETRON 4 MG/1
4 TABLET, ORALLY DISINTEGRATING ORAL
Status: DISCONTINUED | OUTPATIENT
Start: 2022-01-01 | End: 2022-01-01 | Stop reason: HOSPADM

## 2022-01-01 RX ADMIN — Medication 6 UNITS: at 12:52

## 2022-01-01 RX ADMIN — GADOTERIDOL 10 ML: 279.3 INJECTION, SOLUTION INTRAVENOUS at 22:44

## 2022-01-01 RX ADMIN — MYCOPHENOLATE MOFETIL 500 MG: 250 CAPSULE ORAL at 07:06

## 2022-01-01 RX ADMIN — BUDESONIDE 9 MG: 3 CAPSULE, GELATIN COATED ORAL at 11:38

## 2022-01-01 RX ADMIN — Medication 10 ML: at 07:15

## 2022-01-01 RX ADMIN — SPIRONOLACTONE 100 MG: 100 TABLET ORAL at 09:28

## 2022-01-01 RX ADMIN — Medication 3 UNITS: at 13:17

## 2022-01-01 RX ADMIN — Medication 6 UNITS: at 09:14

## 2022-01-01 RX ADMIN — SODIUM CHLORIDE 1467 ML: 9 INJECTION, SOLUTION INTRAVENOUS at 21:36

## 2022-01-01 RX ADMIN — Medication 3 UNITS: at 17:21

## 2022-01-01 RX ADMIN — Medication 2 UNITS: at 21:58

## 2022-01-01 RX ADMIN — SODIUM CHLORIDE, PRESERVATIVE FREE 1 G: 5 INJECTION INTRAVENOUS at 20:46

## 2022-01-01 RX ADMIN — Medication 10 ML: at 18:02

## 2022-01-01 RX ADMIN — Medication 2 UNITS: at 11:38

## 2022-01-01 RX ADMIN — Medication 2 UNITS: at 13:15

## 2022-01-01 RX ADMIN — MYCOPHENOLATE MOFETIL 500 MG: 250 CAPSULE ORAL at 08:02

## 2022-01-01 RX ADMIN — SODIUM CHLORIDE 100 ML/HR: 900 INJECTION, SOLUTION INTRAVENOUS at 05:00

## 2022-01-01 RX ADMIN — PANTOPRAZOLE SODIUM 40 MG: 40 TABLET, DELAYED RELEASE ORAL at 08:26

## 2022-01-01 RX ADMIN — BUDESONIDE 9 MG: 3 CAPSULE, GELATIN COATED ORAL at 09:40

## 2022-01-01 RX ADMIN — Medication 3 UNITS: at 08:40

## 2022-01-01 RX ADMIN — Medication 4 UNITS: at 23:02

## 2022-01-01 RX ADMIN — PANTOPRAZOLE SODIUM 40 MG: 40 TABLET, DELAYED RELEASE ORAL at 08:40

## 2022-01-01 RX ADMIN — SODIUM CHLORIDE, PRESERVATIVE FREE 1 G: 5 INJECTION INTRAVENOUS at 21:06

## 2022-01-01 RX ADMIN — MYCOPHENOLATE MOFETIL 500 MG: 250 CAPSULE ORAL at 17:32

## 2022-01-01 RX ADMIN — PANTOPRAZOLE SODIUM 40 MG: 40 TABLET, DELAYED RELEASE ORAL at 09:40

## 2022-01-01 RX ADMIN — Medication 13 UNITS: at 14:43

## 2022-01-01 RX ADMIN — Medication 5 UNITS: at 12:53

## 2022-01-01 RX ADMIN — Medication 6 UNITS: at 12:48

## 2022-01-01 RX ADMIN — Medication 2 UNITS: at 18:49

## 2022-01-01 RX ADMIN — BUDESONIDE 9 MG: 3 CAPSULE, GELATIN COATED ORAL at 09:41

## 2022-01-01 RX ADMIN — Medication 10 ML: at 22:09

## 2022-01-01 RX ADMIN — SODIUM CHLORIDE, PRESERVATIVE FREE 1 G: 5 INJECTION INTRAVENOUS at 21:21

## 2022-01-01 RX ADMIN — SPIRONOLACTONE 100 MG: 100 TABLET ORAL at 09:58

## 2022-01-01 RX ADMIN — INSULIN GLARGINE 20 UNITS: 100 INJECTION, SOLUTION SUBCUTANEOUS at 09:41

## 2022-01-01 RX ADMIN — Medication 10 ML: at 14:19

## 2022-01-01 RX ADMIN — PANTOPRAZOLE SODIUM 40 MG: 40 TABLET, DELAYED RELEASE ORAL at 08:28

## 2022-01-01 RX ADMIN — Medication 3 UNITS: at 17:19

## 2022-01-01 RX ADMIN — PANTOPRAZOLE SODIUM 40 MG: 40 TABLET, DELAYED RELEASE ORAL at 09:08

## 2022-01-01 RX ADMIN — Medication 20 UNITS: at 09:16

## 2022-01-01 RX ADMIN — Medication 10 ML: at 22:00

## 2022-01-01 RX ADMIN — SPIRONOLACTONE 100 MG: 100 TABLET ORAL at 08:40

## 2022-01-01 RX ADMIN — Medication 6 UNITS: at 09:40

## 2022-01-01 RX ADMIN — INSULIN GLARGINE 20 UNITS: 100 INJECTION, SOLUTION SUBCUTANEOUS at 09:58

## 2022-01-01 RX ADMIN — SODIUM CHLORIDE 100 ML/HR: 900 INJECTION, SOLUTION INTRAVENOUS at 08:35

## 2022-01-01 RX ADMIN — SODIUM CHLORIDE 250 MG: 9 INJECTION, SOLUTION INTRAVENOUS at 18:02

## 2022-01-01 RX ADMIN — SPIRONOLACTONE 100 MG: 100 TABLET ORAL at 09:10

## 2022-01-01 RX ADMIN — SODIUM CHLORIDE 75 ML/HR: 900 INJECTION, SOLUTION INTRAVENOUS at 00:50

## 2022-01-01 RX ADMIN — IMMUNE GLOBULIN (HUMAN) 15 G: 10 INJECTION INTRAVENOUS; SUBCUTANEOUS at 14:24

## 2022-01-01 RX ADMIN — SODIUM CHLORIDE 100 ML/HR: 900 INJECTION, SOLUTION INTRAVENOUS at 19:09

## 2022-01-01 RX ADMIN — Medication 3 UNITS: at 21:13

## 2022-01-01 RX ADMIN — CEFTRIAXONE 2 G: 2 INJECTION, POWDER, FOR SOLUTION INTRAMUSCULAR; INTRAVENOUS at 20:45

## 2022-01-01 RX ADMIN — Medication 10 ML: at 18:25

## 2022-01-01 RX ADMIN — BUDESONIDE 9 MG: 3 CAPSULE, GELATIN COATED ORAL at 09:28

## 2022-01-01 RX ADMIN — Medication 6 UNITS: at 12:22

## 2022-01-01 RX ADMIN — Medication 3 UNITS: at 09:42

## 2022-01-01 RX ADMIN — SODIUM CHLORIDE 250 MG: 9 INJECTION, SOLUTION INTRAVENOUS at 17:26

## 2022-01-01 RX ADMIN — Medication 3 UNITS: at 12:29

## 2022-01-01 RX ADMIN — Medication 3 UNITS: at 12:37

## 2022-01-01 RX ADMIN — Medication 3 UNITS: at 17:24

## 2022-01-01 RX ADMIN — MYCOPHENOLATE MOFETIL 500 MG: 250 CAPSULE ORAL at 07:49

## 2022-01-01 RX ADMIN — MYCOPHENOLATE MOFETIL 500 MG: 250 CAPSULE ORAL at 18:48

## 2022-01-01 RX ADMIN — Medication 5 UNITS: at 17:19

## 2022-01-01 RX ADMIN — PANTOPRAZOLE SODIUM 40 MG: 40 TABLET, DELAYED RELEASE ORAL at 09:10

## 2022-01-01 RX ADMIN — MYCOPHENOLATE MOFETIL 500 MG: 250 CAPSULE ORAL at 17:21

## 2022-01-01 RX ADMIN — SODIUM CHLORIDE 75 ML/HR: 900 INJECTION, SOLUTION INTRAVENOUS at 08:05

## 2022-01-01 RX ADMIN — SODIUM CHLORIDE, PRESERVATIVE FREE 1 G: 5 INJECTION INTRAVENOUS at 21:28

## 2022-01-01 RX ADMIN — MYCOPHENOLATE MOFETIL 500 MG: 250 CAPSULE ORAL at 18:01

## 2022-01-01 RX ADMIN — Medication 3 UNITS: at 08:02

## 2022-01-01 RX ADMIN — Medication 3 UNITS: at 18:01

## 2022-01-01 RX ADMIN — Medication 20 UNITS: at 09:04

## 2022-01-01 RX ADMIN — SPIRONOLACTONE 100 MG: 100 TABLET ORAL at 09:05

## 2022-01-01 RX ADMIN — IMMUNE GLOBULIN (HUMAN) 20 G: 10 INJECTION INTRAVENOUS; SUBCUTANEOUS at 16:43

## 2022-01-01 RX ADMIN — Medication 5 UNITS: at 07:14

## 2022-01-01 RX ADMIN — INSULIN GLARGINE 20 UNITS: 100 INJECTION, SOLUTION SUBCUTANEOUS at 08:40

## 2022-01-01 RX ADMIN — MYCOPHENOLATE MOFETIL 500 MG: 250 CAPSULE ORAL at 07:00

## 2022-01-01 RX ADMIN — DIBASIC SODIUM PHOSPHATE, MONOBASIC POTASSIUM PHOSPHATE AND MONOBASIC SODIUM PHOSPHATE 1 TABLET: 852; 155; 130 TABLET ORAL at 08:28

## 2022-01-01 RX ADMIN — AZITHROMYCIN DIHYDRATE 500 MG: 500 INJECTION, POWDER, LYOPHILIZED, FOR SOLUTION INTRAVENOUS at 20:36

## 2022-01-01 RX ADMIN — Medication 5 UNITS: at 07:05

## 2022-01-01 RX ADMIN — IMMUNE GLOBULIN (HUMAN) 20 G: 10 INJECTION INTRAVENOUS; SUBCUTANEOUS at 15:03

## 2022-01-01 RX ADMIN — SODIUM CHLORIDE 100 ML/HR: 900 INJECTION, SOLUTION INTRAVENOUS at 23:19

## 2022-01-01 RX ADMIN — INSULIN GLARGINE 10 UNITS: 100 INJECTION, SOLUTION SUBCUTANEOUS at 08:26

## 2022-01-01 RX ADMIN — Medication 4 UNITS: at 22:09

## 2022-01-01 RX ADMIN — PANTOPRAZOLE SODIUM 40 MG: 40 TABLET, DELAYED RELEASE ORAL at 09:17

## 2022-01-01 RX ADMIN — MYCOPHENOLATE MOFETIL 500 MG: 250 CAPSULE ORAL at 07:13

## 2022-01-01 RX ADMIN — BUDESONIDE 9 MG: 3 CAPSULE, GELATIN COATED ORAL at 09:10

## 2022-01-01 RX ADMIN — BUDESONIDE 9 MG: 3 CAPSULE, GELATIN COATED ORAL at 09:16

## 2022-01-01 RX ADMIN — PANTOPRAZOLE SODIUM 40 MG: 40 TABLET, DELAYED RELEASE ORAL at 09:28

## 2022-01-01 RX ADMIN — DIBASIC SODIUM PHOSPHATE, MONOBASIC POTASSIUM PHOSPHATE AND MONOBASIC SODIUM PHOSPHATE 1 TABLET: 852; 155; 130 TABLET ORAL at 17:34

## 2022-01-01 RX ADMIN — IMMUNE GLOBULIN (HUMAN) 20 G: 10 INJECTION INTRAVENOUS; SUBCUTANEOUS at 14:01

## 2022-01-01 RX ADMIN — SODIUM CHLORIDE 100 ML/HR: 900 INJECTION, SOLUTION INTRAVENOUS at 13:08

## 2022-01-01 RX ADMIN — MYCOPHENOLATE MOFETIL 500 MG: 250 CAPSULE ORAL at 07:34

## 2022-01-01 RX ADMIN — DIBASIC SODIUM PHOSPHATE, MONOBASIC POTASSIUM PHOSPHATE AND MONOBASIC SODIUM PHOSPHATE 1 TABLET: 852; 155; 130 TABLET ORAL at 18:48

## 2022-01-01 RX ADMIN — Medication 10 ML: at 06:00

## 2022-01-01 RX ADMIN — SPIRONOLACTONE 100 MG: 100 TABLET ORAL at 09:18

## 2022-01-01 RX ADMIN — Medication 2 UNITS: at 07:35

## 2022-01-01 RX ADMIN — Medication 7 UNITS: at 17:20

## 2022-01-01 RX ADMIN — Medication 20 UNITS: at 09:00

## 2022-01-01 RX ADMIN — MYCOPHENOLATE MOFETIL 500 MG: 250 CAPSULE ORAL at 09:17

## 2022-01-01 RX ADMIN — SPIRONOLACTONE 100 MG: 100 TABLET ORAL at 11:39

## 2022-01-01 RX ADMIN — Medication 6 UNITS: at 09:12

## 2022-01-01 RX ADMIN — Medication 2 UNITS: at 12:48

## 2022-01-01 RX ADMIN — Medication 10 ML: at 17:36

## 2022-01-01 RX ADMIN — Medication 6 UNITS: at 17:32

## 2022-01-01 RX ADMIN — BUDESONIDE 9 MG: 3 CAPSULE, GELATIN COATED ORAL at 08:40

## 2022-01-01 RX ADMIN — INSULIN GLARGINE 10 UNITS: 100 INJECTION, SOLUTION SUBCUTANEOUS at 14:43

## 2022-01-01 RX ADMIN — IMMUNE GLOBULIN (HUMAN) 15 G: 10 INJECTION INTRAVENOUS; SUBCUTANEOUS at 13:29

## 2022-01-01 RX ADMIN — MYCOPHENOLATE MOFETIL 500 MG: 250 CAPSULE ORAL at 17:23

## 2022-01-01 RX ADMIN — Medication 8 UNITS: at 18:25

## 2022-01-01 RX ADMIN — Medication 3 UNITS: at 18:24

## 2022-01-01 RX ADMIN — Medication 3 UNITS: at 17:33

## 2022-01-01 RX ADMIN — MYCOPHENOLATE MOFETIL 500 MG: 250 CAPSULE ORAL at 17:19

## 2022-01-01 RX ADMIN — Medication 2 UNITS: at 07:32

## 2022-01-01 RX ADMIN — Medication 2 UNITS: at 12:21

## 2022-01-01 RX ADMIN — Medication 6 UNITS: at 18:48

## 2022-01-01 RX ADMIN — Medication 6 UNITS: at 17:35

## 2022-01-01 RX ADMIN — DIBASIC SODIUM PHOSPHATE, MONOBASIC POTASSIUM PHOSPHATE AND MONOBASIC SODIUM PHOSPHATE 1 TABLET: 852; 155; 130 TABLET ORAL at 09:07

## 2022-01-01 RX ADMIN — MYCOPHENOLATE MOFETIL 500 MG: 250 CAPSULE ORAL at 17:34

## 2022-01-01 RX ADMIN — Medication 4 UNITS: at 22:22

## 2022-01-01 RX ADMIN — Medication 2 UNITS: at 17:34

## 2022-01-01 RX ADMIN — Medication 7 UNITS: at 18:02

## 2022-01-01 RX ADMIN — MYCOPHENOLATE MOFETIL 500 MG: 250 CAPSULE ORAL at 18:24

## 2022-01-01 RX ADMIN — Medication 2 UNITS: at 22:09

## 2022-01-01 RX ADMIN — BUDESONIDE 9 MG: 3 CAPSULE, GELATIN COATED ORAL at 10:11

## 2022-01-01 RX ADMIN — Medication 3 UNITS: at 11:38

## 2022-01-01 RX ADMIN — Medication 10 ML: at 22:01

## 2022-01-01 RX ADMIN — FUROSEMIDE 40 MG: 40 TABLET ORAL at 09:17

## 2022-01-01 RX ADMIN — Medication 2 UNITS: at 23:38

## 2022-01-01 RX ADMIN — SODIUM CHLORIDE 100 ML/HR: 900 INJECTION, SOLUTION INTRAVENOUS at 09:30

## 2022-01-01 RX ADMIN — SODIUM CHLORIDE, PRESERVATIVE FREE 10 ML: 5 INJECTION INTRAVENOUS at 22:45

## 2022-01-01 RX ADMIN — DIBASIC SODIUM PHOSPHATE, MONOBASIC POTASSIUM PHOSPHATE AND MONOBASIC SODIUM PHOSPHATE 1 TABLET: 852; 155; 130 TABLET ORAL at 10:00

## 2022-01-01 RX ADMIN — SPIRONOLACTONE 100 MG: 100 TABLET ORAL at 08:28

## 2022-01-01 RX ADMIN — Medication 10 ML: at 07:07

## 2022-01-01 RX ADMIN — SODIUM CHLORIDE 250 MG: 9 INJECTION, SOLUTION INTRAVENOUS at 18:24

## 2022-01-01 RX ADMIN — INSULIN GLARGINE 20 UNITS: 100 INJECTION, SOLUTION SUBCUTANEOUS at 09:28

## 2022-01-01 RX ADMIN — SODIUM CHLORIDE, PRESERVATIVE FREE 1 G: 5 INJECTION INTRAVENOUS at 20:42

## 2022-01-01 RX ADMIN — Medication 10 ML: at 22:12

## 2022-01-01 RX ADMIN — SPIRONOLACTONE 100 MG: 100 TABLET ORAL at 09:40

## 2022-01-01 RX ADMIN — BUDESONIDE 9 MG: 3 CAPSULE, GELATIN COATED ORAL at 09:17

## 2022-01-01 RX ADMIN — DIBASIC SODIUM PHOSPHATE, MONOBASIC POTASSIUM PHOSPHATE AND MONOBASIC SODIUM PHOSPHATE 1 TABLET: 852; 155; 130 TABLET ORAL at 17:32

## 2022-01-01 RX ADMIN — PANTOPRAZOLE SODIUM 40 MG: 40 TABLET, DELAYED RELEASE ORAL at 09:58

## 2022-01-02 NOTE — PERIOP NOTES
Informed patient of COVID requirements, patient to complete COVID curbside testing at [facility] Tuesday, January 4th between [id number]. Patient verbalized understanding that COVID test is required to proceed with procedure.

## 2022-01-04 ENCOUNTER — HOSPITAL ENCOUNTER (OUTPATIENT)
Dept: LAB | Age: 77
Discharge: HOME | End: 2022-01-04
Payer: MEDICARE

## 2022-01-04 ENCOUNTER — TRANSCRIBE ORDER (OUTPATIENT)
Dept: EMERGENCY DEPT | Age: 77
End: 2022-01-04

## 2022-01-04 DIAGNOSIS — Z01.812 PRE-PROCEDURAL LABORATORY EXAMINATIONS: Primary | ICD-10-CM

## 2022-01-04 DIAGNOSIS — Z01.812 PRE-PROCEDURAL LABORATORY EXAMINATIONS: ICD-10-CM

## 2022-01-04 PROCEDURE — U0005 INFEC AGEN DETEC AMPLI PROBE: HCPCS

## 2022-01-05 LAB
SARS-COV-2, XPLCVT: NOT DETECTED
SOURCE, COVRS: NORMAL

## 2022-01-05 NOTE — PROGRESS NOTES
Name stated on voice mail- detailed information left regarding arrival time, medications, prep,  and location. Phone # left to call back if questions or concerns.   Edi Childs

## 2022-01-06 NOTE — PERIOP NOTES
[address]  Endoscopy Preprocedure Instructions      1. On the day of your surgery, please report to registration located on the 2nd floor of the  [facility]. yes    2. You must have a responsible adult to drive you to the hospital, stay at the hospital during your procedure and drive you home. If they leave your procedure will not be started (no exceptions). yes    3. Do not have anything to eat or drink (including water, gum, mints, coffee, and juice) after midnight. This does not apply to the medications you were instructed to take by your physician. yes  If you are currently taking Plavix, Coumadin, Aspirin, or other blood-thinning agents, contact your physician for special instructions. yes,    4. If you are having a procedure that requires bowel prep: We recommend that you have only clear liquids the day before your procedure and begin your bowel prep by 5:00 pm.  You may continue to drink clear liquids until midnight. If for any reason you are not able to complete your prep please notify your physician immediately. yes    5. Have a list of all current medications, including vitamins, herbal supplements and any other over the counter medications. yes    6. If you wear glasses, contacts, dentures and/or hearing aids, they may be removed prior to procedure, please bring a case to store them in. yes    7. You should understand that if you do not follow these instructions your procedure may be cancelled. If your physical condition changes (I.e. fever, cold or flu) please contact your doctor as soon as possible. 8. It is important that you be on time. If for any reason you are unable to keep your appointment please call [phone] the day of or your physicians office prior to your scheduled procedure    9.  Have you received your COVID Vaccine? not applicable If no, you will need to receive a COVID test/swab here at [facility] the MOB parking lot Monday - Friday 8a - 11am. There are no Saturday or Sunday swabbing at any [facility]. (patient verbalizes understanding) yes

## 2022-01-07 ENCOUNTER — ANESTHESIA (OUTPATIENT)
Dept: ENDOSCOPY | Age: 77
End: 2022-01-07
Payer: MEDICARE

## 2022-01-07 ENCOUNTER — ANESTHESIA EVENT (OUTPATIENT)
Dept: ENDOSCOPY | Age: 77
End: 2022-01-07
Payer: MEDICARE

## 2022-01-07 ENCOUNTER — HOSPITAL ENCOUNTER (OUTPATIENT)
Age: 77
Setting detail: OUTPATIENT SURGERY
Discharge: HOME | End: 2022-01-07
Attending: SPECIALIST | Admitting: SPECIALIST
Payer: MEDICARE

## 2022-01-07 VITALS
HEIGHT: 60 IN | TEMPERATURE: 97.8 F | HEART RATE: 90 BPM | DIASTOLIC BLOOD PRESSURE: 51 MMHG | RESPIRATION RATE: 19 BRPM | BODY MASS INDEX: 26.45 KG/M2 | OXYGEN SATURATION: 100 % | SYSTOLIC BLOOD PRESSURE: 101 MMHG | WEIGHT: 134.7 LBS

## 2022-01-07 LAB
GLUCOSE BLD STRIP.AUTO-MCNC: 113 MG/DL (ref 65–117)
SERVICE CMNT-IMP: NORMAL

## 2022-01-07 PROCEDURE — 82962 GLUCOSE BLOOD TEST: CPT

## 2022-01-07 PROCEDURE — 77030021593 HC FCPS BIOP ENDOSC BSC -A: Performed by: SPECIALIST

## 2022-01-07 PROCEDURE — 76060000031 HC ANESTHESIA FIRST 0.5 HR: Performed by: SPECIALIST

## 2022-01-07 PROCEDURE — 2709999900 HC NON-CHARGEABLE SUPPLY: Performed by: SPECIALIST

## 2022-01-07 PROCEDURE — 76040000019: Performed by: SPECIALIST

## 2022-01-07 PROCEDURE — 74011000250 HC RX REV CODE- 250: Performed by: NURSE ANESTHETIST, CERTIFIED REGISTERED

## 2022-01-07 PROCEDURE — 74011250636 HC RX REV CODE- 250/636: Performed by: NURSE ANESTHETIST, CERTIFIED REGISTERED

## 2022-01-07 PROCEDURE — 88305 TISSUE EXAM BY PATHOLOGIST: CPT

## 2022-01-07 RX ORDER — NALOXONE HYDROCHLORIDE 0.4 MG/ML
0.4 INJECTION, SOLUTION INTRAMUSCULAR; INTRAVENOUS; SUBCUTANEOUS
Status: DISCONTINUED | OUTPATIENT
Start: 2022-01-07 | End: 2022-01-07 | Stop reason: HOSPADM

## 2022-01-07 RX ORDER — MIDAZOLAM HYDROCHLORIDE 1 MG/ML
.25-5 INJECTION, SOLUTION INTRAMUSCULAR; INTRAVENOUS AS NEEDED
Status: DISCONTINUED | OUTPATIENT
Start: 2022-01-07 | End: 2022-01-07 | Stop reason: HOSPADM

## 2022-01-07 RX ORDER — SODIUM CHLORIDE 9 MG/ML
50 INJECTION, SOLUTION INTRAVENOUS CONTINUOUS
Status: DISCONTINUED | OUTPATIENT
Start: 2022-01-07 | End: 2022-01-07 | Stop reason: HOSPADM

## 2022-01-07 RX ORDER — DEXTROMETHORPHAN/PSEUDOEPHED 2.5-7.5/.8
1.2 DROPS ORAL
Status: DISCONTINUED | OUTPATIENT
Start: 2022-01-07 | End: 2022-01-07 | Stop reason: HOSPADM

## 2022-01-07 RX ORDER — DILTIAZEM HYDROCHLORIDE 30 MG/1
30 TABLET, FILM COATED ORAL 4 TIMES DAILY
COMMUNITY
End: 2022-06-24

## 2022-01-07 RX ORDER — PROPOFOL 10 MG/ML
INJECTION, EMULSION INTRAVENOUS
Status: DISCONTINUED | OUTPATIENT
Start: 2022-01-07 | End: 2022-01-07

## 2022-01-07 RX ORDER — LIDOCAINE HYDROCHLORIDE 20 MG/ML
INJECTION, SOLUTION EPIDURAL; INFILTRATION; INTRACAUDAL; PERINEURAL AS NEEDED
Status: DISCONTINUED | OUTPATIENT
Start: 2022-01-07 | End: 2022-01-07 | Stop reason: HOSPADM

## 2022-01-07 RX ORDER — FLUMAZENIL 0.1 MG/ML
0.2 INJECTION INTRAVENOUS
Status: DISCONTINUED | OUTPATIENT
Start: 2022-01-07 | End: 2022-01-07 | Stop reason: HOSPADM

## 2022-01-07 RX ORDER — FENTANYL CITRATE 50 UG/ML
25 INJECTION, SOLUTION INTRAMUSCULAR; INTRAVENOUS AS NEEDED
Status: DISCONTINUED | OUTPATIENT
Start: 2022-01-07 | End: 2022-01-07 | Stop reason: HOSPADM

## 2022-01-07 RX ORDER — PROPOFOL 10 MG/ML
INJECTION, EMULSION INTRAVENOUS AS NEEDED
Status: DISCONTINUED | OUTPATIENT
Start: 2022-01-07 | End: 2022-01-07 | Stop reason: HOSPADM

## 2022-01-07 RX ADMIN — PROPOFOL INJECTABLE EMULSION 20 MG: 10 INJECTION, EMULSION INTRAVENOUS at 10:46

## 2022-01-07 RX ADMIN — LIDOCAINE HYDROCHLORIDE 60 MG: 20 INJECTION, SOLUTION INTRAVENOUS at 10:37

## 2022-01-07 RX ADMIN — PROPOFOL INJECTABLE EMULSION 30 MG: 10 INJECTION, EMULSION INTRAVENOUS at 10:41

## 2022-01-07 RX ADMIN — PROPOFOL INJECTABLE EMULSION 70 MG: 10 INJECTION, EMULSION INTRAVENOUS at 10:39

## 2022-01-07 RX ADMIN — PROPOFOL INJECTABLE EMULSION 20 MG: 10 INJECTION, EMULSION INTRAVENOUS at 10:45

## 2022-01-07 RX ADMIN — PROPOFOL INJECTABLE EMULSION 20 MG: 10 INJECTION, EMULSION INTRAVENOUS at 10:44

## 2022-01-07 NOTE — PROGRESS NOTES
Endoscopy discharge instructions have been reviewed and given to patient. The patient verbalized understanding and acceptance of instructions. Dr. Radha Cid discussed with patient procedure findings and next steps.

## 2022-01-07 NOTE — PROCEDURES
[address] [place], [place]  [phone]      2022    Esophagogastroduodenoscopy (EGD) Procedure Note  Surya Abdi  : 1945  [facility] Medical Record Number: [id number]      Indications:    Melena, with recent upper GI endoscopy elsewhere revealing bleeding esophageal ulcer. Referring Physician:  Ana Saleh NP  Anesthesia/Sedation:  Conscious sedation/deep sedation/monitored anesthesia -- see notes. Endoscopist:  Dr. Maria Ines Lloyd  Complications:  None  Estimated Blood Loss:  None    Permit:  The indications, risks, benefits and alternatives were reviewed with the patient or their decision maker who was provided an opportunity to ask questions and all questions were answered. The specific risks of esophagogastroduodenoscopy with conscious sedation were reviewed, including but not limited to anesthetic complication, bleeding, adverse drug reaction, missed lesion, infection, IV site reactions, and intestinal perforation which would lead to the need for surgical repair. Alternatives to EGD including radiographic imaging, observation without testing, or laboratory testing were reviewed as well as the limitations of those alternatives discussed. After considering the options and having all their questions answered, the patient or their decision maker provided both verbal and written consent to proceed. Procedure in Detail:  After obtaining informed consent, positioning of the patient in the left lateral decubitus position, and conduction of a pre-procedure pause or \"time out\" the endoscope was introduced into the mouth and advanced to the duodenum. A careful inspection was made, and findings or interventions are described below. Findings:   Esophagus:Hiatal hernia and Carrington's esophagus without bleeding or ulceration or focal lesions. TGF 32cm BERNABE 20cm.   Clemons [id number]  We took cold forceps biopsies from 30cm, 28cm, and 26cm but at this point the patient began to develop some stridorous respirations suggesting impending laryngospasm. I elected to terminate the procedure anticipating need for airway intervention, but as we removed the endoscope her respiratory pattern returned to normal.      Stomach: normal   Duodenum/jejunum: normal      Specimens: See above    Impression: Hiatus hernia and Carrington's esophagus with biopsies from the mid and distal aspects of Carrington's for histology. Recommendations:  -Acid suppression with a proton pump inhibitor. , -Await pathology. Will need repeat evaluation in 6-12 months for completion of Carrington's surveillance biopsies but if today's biopsies and the upcoming procedure reveals no dysplasia could shift into q3yr surveillance. Thank you for entrusting me with this patient's care. Please do not hesitate to contact me with any questions or if I can be of assistance with any of your other patients' GI needs. Signed By: Crystal Horne MD                        January 7, 2022     Surgical assistant none. Implants none unless specified.

## 2022-01-07 NOTE — DISCHARGE INSTRUCTIONS
[address] [facility] KEVON Parker MD  [phone]      January 7, 2022     [address]: 1945    ENDOSCOPY DISCHARGE INSTRUCTIONS    If there is redness at IV site you should apply warm compress to area. If redness or soreness persist contact Dr. Vernon Parker' or your primary care doctor. Gaseous discomfort may develop, but walking, belching will help relieve this. You may not operate a vehicle for 12 hours  You may not operate machinery or dangerous appliances for rest of today  You may not drink alcoholic beverages for 12 hours  Avoid making any critical decisions for 24 hours    DIET:  You may resume your normal diet, but some patients find that heavy or large meals may lead to indigestion or vomiting. I suggest a light meal as first food intake. MEDICATIONS:  The use of some over-the-counter pain medication may lead to bleeding after biopsies or other procedures you may have had done. Tylenol (also called acetaminophen) is safe to take and will not lead to bleeding. Based on the procedure you had today you may not safely take aspirin or aspirin-like products for the next ten (10) days. ACTIVITY:  You may resume your normal household activities, but it is recommended that you spend the remainder of the day resting -  avoid any strenuous activity. CALL DR. Rogers Valladares' OFFICE IF:  Increasing pain, nausea, vomiting  Abdominal distension (swelling)  Significant new or increased bleeding (oral or rectal)  Fever/Chills  Chest pain/shortness of breath                   Additional instructions:   No aspirin 10 days. We found that the esophagus has healed, but you have a condition called Carrington's esophagus which we biopsied today. I'll contact you with those results in about a week.   I want you to resume treatment with the budesonide medication for your liver, be sure to drink plenty of water after each dose.  You need to continue the acid reflux medications. It was an honor to be your doctor today. Please let me or my office staff know if you have any feedback about today's procedure.     Chase Cunningham MD

## 2022-01-07 NOTE — ANESTHESIA PREPROCEDURE EVALUATION
Relevant Problems   No relevant active problems       Anesthetic History   No history of anesthetic complications            Review of Systems / Medical History  Patient summary reviewed, nursing notes reviewed and pertinent labs reviewed    Pulmonary  Within defined limits                 Neuro/Psych   Within defined limits           Cardiovascular    Hypertension              Exercise tolerance: >4 METS     GI/Hepatic/Renal     GERD: well controlled           Endo/Other    Diabetes: well controlled, type 2         Other Findings   Comments:   Lupus           Physical Exam    Airway  Mallampati: II  TM Distance: 4 - 6 cm  Neck ROM: normal range of motion   Mouth opening: Normal     Cardiovascular    Rhythm: regular  Rate: normal         Dental  No notable dental hx       Pulmonary  Breath sounds clear to auscultation               Abdominal  GI exam deferred       Other Findings            Anesthetic Plan    ASA: 3  Anesthesia type: MAC          Induction: Intravenous  Anesthetic plan and risks discussed with: Patient

## 2022-01-07 NOTE — PERIOP NOTES
Received recovery report from Anesthesia team, see anesthesia note. ABD remains soft and non-tender post procedure. Pt has no complaints at this time and tolerated the procedure well. Endoscope was pre-cleaned at bedside immediately following procedure by Bridgette Mistry. Post recovery report given to [facility].

## 2022-01-07 NOTE — PROGRESS NOTES
Lilia Fairchild  1945  [id number]    Situation:  Verbal report received from:   Luiz Small RN   Procedure: Procedure(s):  ESOPHAGOGASTRODUODENOSCOPY (EGD)  ESOPHAGOGASTRODUODENAL (EGD) BIOPSY    Background:    Preoperative diagnosis: Autoimmune hepatitis ([facility] [address] [K75.4]  Esophagitis [K20.90]  Postoperative diagnosis: Hiatal hernia  Barretts Esophagus    :  Dr. Cee Davis   Assistant(s): Endoscopy Technician-1: Jovani Michaud  Endoscopy RN-1: Kings Kevin RN    Specimens:   ID Type Source Tests Collected by Time Destination   1 : Esophagus Biopsy at 30cm Preservative Esophagus, Mid  Kalli Luis MD 1/7/2022 10[facility]ology   2 : Esophagus Biopsy at 28cm Preservative   Kalli Luis MD 1/7/2022 1045 Pathology   3 : Esophagus Biopsy at [address] [facility], Neil Busch MD 1/7/2022 1045 Pathology     H. Pylori  no    Assessment:  Intra-procedure medications       Anesthesia gave intra-procedure sedation and medications, see anesthesia flow sheet yes    Intravenous fluids: NS@ KVO     Vital signs stable   yes    Abdominal assessment: round and soft   yes    Recommendation:  Discharge patient per MD order  yes.   Return to floor   outpatient  Family or Friend   Spouse   Permission to share finding with family or friend yes

## 2022-01-07 NOTE — ANESTHESIA POSTPROCEDURE EVALUATION
Procedure(s):  ESOPHAGOGASTRODUODENOSCOPY (EGD)  ESOPHAGOGASTRODUODENAL (EGD) BIOPSY. MAC    Anesthesia Post Evaluation        Patient location during evaluation: bedside  Level of consciousness: awake  Pain management: satisfactory to patient  Airway patency: patent  Anesthetic complications: no  Cardiovascular status: acceptable  Respiratory status: acceptable  Hydration status: acceptable        INITIAL Post-op Vital signs:   Vitals Value Taken Time   /51 01/07/22 1115   Temp 36.6 °C (97.8 °F) 01/07/22 1055   Pulse 88 01/07/22 1119   Resp 19 01/07/22 1119   SpO2 100 % 01/07/22 1119   Vitals shown include unvalidated device data.

## 2022-01-07 NOTE — H&P
Date: 2021 3:15 PM  Patient Name: Ivania Ragsdale  Account #: [id number]  Gender: Female   (age): 1945 (77)  Provider:    Marcus Rangel. Jose Juan Luu MD     Referring Physician:    Griselda Buckner  [address] [place] [address]  [phone] (phone)  [phone] (fax)     Chief Complaint:    I had some bleeding. History of Present Illness:  77 F with diagnosis of autoimmune hepatitis based on serologic testing and liver biopsy. I prescribed budesonide, she developed melena, seen at [address] [place] [address], EGD showed esophageal ulcer. Treated wtih PPI and no bleeding since. She is going to take iron, most recent hgb 11. Will have her continue PPI, repeat EGD in 4 weeks and if esophagitis healing or resolved resume therapy with budesonide. Past Medical History  Medical Conditions:   Arthritis  High blood pressure  Lupus  Surgical Procedures:   Carpal tunnel surgery,   Gallbladder surgery,   Back surgery,   Medications:   candesartan-hydrochlorothiazid 32-25 mg Take 1 tablet by mouth once a day as directed  hydroxychloroquine 200 mg Take 1 tablet by mouth once a day as directed  metoprolol tartrate 100 mg Take 1 tablet by mouth once a day as directed  Allergies:   Penicillins  Immunizations:   Influenza, seasonal, injectable, 10/01/2021  Influenza, seasonal, injectable, 10/01/2020  Pneumococcal conjugate PCV 2018  Social History  Alcohol:   None  Tobacco:   Never smoker  Drugs:   None  Exercise:   Exercise less than 3 times a week. Caffeine:   Daily. Marital Status:         Occupation:    Retired     Family History   No history of Colon Cancer, Colon Polyps, Esophogeal Cancer, GI Cancers, IBD (Crohn's or UC), Liver disease  Mother: Diagnosed with Lupus; Review of Systems:  Cardiovascular: Denies chest pain, irregular heart beat, palpitations, peripheral edema, syncope, Sweats.   Constitutional: Denies fatigue, fever, loss of appetite, weight gain, weight loss.  ENMT: Denies nose bleeds, sore throat, hearing loss. Endocrine: Denies excessive thirst, heat intolerance. Eyes: Denies loss of vision. Gastrointestinal: Denies abdominal pain, abdominal swelling, change in bowel habits, constipation, diarrhea, Bloating/gas, heartburn, jaundice, nausea, rectal bleeding, stomach cramps, vomiting, dysphagia, rectal pain, Stool incontinence, hematemesis. Genitourinary: Denies dark urine, dysuria, frequent urination, hematuria, incontinence. Hematologic/Lymphatic: Denies easy bruising, prolonged bleeding. Integumentary: Denies itching, rashes, sun sensitivity. Musculoskeletal: Denies arthritis, back pain, gout, joint pain, muscle weakness, stiffness. Neurological: Denies dizziness, fainting, frequent headaches, memory loss. Psychiatric: Denies anxiety, depression, difficulty sleeping, hallucinations, nervousness, panic attacks, paranoia. Respiratory: Denies cough, dyspnea, wheezing. Vital Signs:  BP  (mmHg)  Pulse  (bpm) Weight (lbs/oz) Height (ft/in) BMI Temp  128/74 104 138 / 5 / 4 23.69 98.4 (F)  Physical Exam:  Constitutional:  Appearance: No distress, appears comfortable. Communication: Understands/receives spoken information. Skin:  Inspection: No rash, no jaundice. Head/face: Inspection: Normacephalic, atraumatic. Eyes:  Conjunctivae/lids: Normal.  Pupils/irises: Pupils equal, round and normal.  ENMT:  External: Normal.  Hearing: Normal.  Lab Results:   Test 8/10/2021 5/18/2021 5/4/2021 Units Limits  Comp.  Metabolic Panel (14)       A/G Ratio 0.4  0.5  1.2 - 2.2  Albumin 2.5  2.9 g/dL 3.7 - 4.7  Alkaline Phosphatase 245  271 IU/L 39 - 117  ALT (SGPT) 187  319 IU/L 0 - 32  AST (SGOT) 294  510 IU/L 0 - 40  Bilirubin, Total 3.2  3.0 mg/dL 0 - 1.2  BUN 15  21 mg/dL 8 - 27  BUN/Creatinine Ratio 19  23  12 - 28  Calcium 8.4  9.5 mg/dL 8.7 - 10.3  Carbon Dioxide, Total 23  22 mmol/L 20 - 29  Chloride 101  99 mmol/L 96 - 106  Creatinine 0.77  0.91 mg/dL 0.57 - 1  eGFR If Africn Am 88  72 mL/min/1.73 > 59  eGFR If NonAfricn Am 76  62 mL/min/1.73 > 59  Globulin, Total 6.3  6.3 g/dL 1.5 - 4.5  Glucose 120  141 mg/dL 65 - 99  Potassium 4.1  4.0 mmol/L 3.5 - 5.2  Protein, Total 8.8  9.2 g/dL 6 - 8.5  Sodium 133  131 mmol/L 134 - 144  Actin (Smooth Muscle) Antibody       Actin (Smooth Muscle) Antibody   64 Units 0 - 19  Alpha-1-Antitrypsin, Serum       Alpha-1-Antitrypsin, Serum   191 mg/dL 101 - 187  Antinuclear Antibodies Direct       NIMO Direct   Negative  Negative  Ceruloplasmin       Ceruloplasmin   33.9 mg/dL 19 - 39  Ferritin, Serum       Ferritin, Serum   103 ng/mL 15 - 150  HBsAg Screen       HBsAg Screen   Negative  Negative  Platelet Count       Hematology Comments:  Note:     Platelets  TNP  U50A4/CW   Hep A Ab, IgM       Hep A Ab, IgM   Negative  Negative  Hep B Core Ab, IgM       Hep B Core Ab, IgM   Negative  Negative  HCV Antibody       Hep C Virus Ab   <0.1 s/co ratio 0.0-0.9  Prothrombin Time (PT)       INR  1.2   0.9 - 1.2  Prothrombin Time  13.1  sec 9.1 - 12  Iron and TIBC       Iron   149 ug/dL 27 - 139  Iron Bind. Cap.(TIBC)   305 ug/dL 250 - 450  Iron Saturation   49 % 15 - 55  UIBC   156 ug/dL 118 - 369  Mitochondrial (M2) Antibody       Mitochondrial (M2) Antibody   <20.0 Units 0.0-20.0  Impressions:   Autoimmune hepatitis  Esophagitis  Plan:   Upper Endoscopy- in 4 weeks. Follow-up office visit in 3 months  Risk & Medical Necessity:    The level of medical decision making for this visit is low. The number and complexity of problems addressed are moderate. The risk of complications and/or morbidity or mortality of patient management is low. Diana Garcia. Nicole Kuo MD    Electronically signed on 2021 3:14:13 PM by Diana Garcia.  Nicole Kuo MD  Kathleen Lew, MRN [id number],  1945 Follow Up, Friday, 2021

## 2022-01-07 NOTE — INTERVAL H&P NOTE
Pre-Endoscopy H&P Update  Chief complaint/HPI/ROS:  The indication for the procedure, the patient's history and the patient's current medications are reviewed prior to the procedure and that data is reported on the H&P to which this document is attached. Any significant complaints with regard to organ systems will be noted, and if not mentioned then a review of systems is not contributory. Past Medical History:   Diagnosis Date    Diabetes ([address]     Elevated liver enzymes     GERD (gastroesophageal reflux disease)     Hypertension     Lupus (HCC)     lupus      Past Surgical History:   Procedure Laterality Date    HX CARPAL TUNNEL RELEASE      HX CHOLECYSTECTOMY      HX ORTHOPAEDIC      back     HX OTHER SURGICAL      gallbladder removal    IR BX LIVER PERCUTANEOUS  11/9/2021    KS BREAST SURGERY PROCEDURE UNLISTED Left     lumpectomy     Social   Social History     Tobacco Use    Smoking status: Never Smoker    Smokeless tobacco: Never Used   Substance Use Topics    Alcohol use: Not Currently     Comment: a glass of wine every 6 months      Family History   Problem Relation Age of Onset    Cancer Mother         breast    Lupus Mother     No Known Problems Father       Allergies   Allergen Reactions    Pcn [Penicillins] Rash      Prior to Admission Medications   Prescriptions Last Dose Informant Patient Reported? Taking?   dilTIAZem IR (Cardizem) 30 mg tablet 1/7/2022 at Unknown time  Yes Yes   Sig: Take 30 mg by mouth four (4) times daily. hydrOXYchloroQUINE (PlaqueniL) 200 mg tablet 1/5/2022  Yes No   Sig: Take 200 mg by mouth daily. melatonin 3 mg tablet Unknown at Unknown time  No No   Sig: Take 1 Tablet by mouth nightly as needed for Sleep.   pantoprazole (Protonix) 40 mg tablet 1/4/2022  No No   Sig: Take 1 Tablet by mouth daily. polyethylene glycol (MIRALAX) 17 gram packet   No No   Sig: Take 1 Packet by mouth daily.       Facility-Administered Medications: None       PHYSICAL EXAM:  The patient is examined immediately prior to the procedure. Visit Vitals  /79   Pulse (!) 129   Temp 97.7 °F (36.5 °C)   Resp 12   Ht 5' (1.524 m)   Wt 61.1 kg (134 lb 11.2 oz)   SpO2 100%   Breastfeeding No   BMI 26.31 kg/m²     Gen: Appears comfortable, no distress. Pulm: comfortable respirations with no abnormal audible breath sounds  HEART: well perfused, no abnormal audible heart sounds  GI: abdomen flat. PLAN:  Informed consent discussion held, patient afforded an opportunity to ask questions and all questions answered. After being advised of the risks, benefits, and alternatives, the patient requested that we proceed and indicated so on a written consent form. Will proceed with procedure as planned.   Kylie Clayton MD

## 2022-06-07 ENCOUNTER — DOCUMENTATION ONLY (OUTPATIENT)
Dept: HEMATOLOGY | Age: 77
End: 2022-06-07

## 2022-06-07 NOTE — PROGRESS NOTES
Verified secondary insurance(Medicare supplement policy) [facility] [phone]

## 2022-06-10 ENCOUNTER — OFFICE VISIT (OUTPATIENT)
Dept: HEMATOLOGY | Age: 77
End: 2022-06-10
Payer: MEDICARE

## 2022-06-10 VITALS
RESPIRATION RATE: 16 BRPM | BODY MASS INDEX: 28.47 KG/M2 | TEMPERATURE: 96.5 F | HEART RATE: 106 BPM | SYSTOLIC BLOOD PRESSURE: 153 MMHG | DIASTOLIC BLOOD PRESSURE: 78 MMHG | HEIGHT: 60 IN | WEIGHT: 145 LBS | OXYGEN SATURATION: 98 %

## 2022-06-10 DIAGNOSIS — M32.9 LUPUS (HCC): ICD-10-CM

## 2022-06-10 DIAGNOSIS — K75.4 AUTOIMMUNE HEPATITIS (HCC): Primary | ICD-10-CM

## 2022-06-10 PROBLEM — K92.2 GI BLEED: Status: RESOLVED | Noted: 2021-01-01 | Resolved: 2022-01-01

## 2022-06-10 PROCEDURE — 99204 OFFICE O/P NEW MOD 45 MIN: CPT | Performed by: INTERNAL MEDICINE

## 2022-06-10 PROCEDURE — G0463 HOSPITAL OUTPT CLINIC VISIT: HCPCS | Performed by: INTERNAL MEDICINE

## 2022-06-10 RX ORDER — POTASSIUM CHLORIDE 1500 MG/1
TABLET, EXTENDED RELEASE ORAL
COMMUNITY
Start: 2022-05-16 | End: 2022-06-24

## 2022-06-10 RX ORDER — GLIMEPIRIDE 1 MG/1
1 TABLET ORAL 2 TIMES DAILY
Status: ON HOLD | COMMUNITY
Start: 2022-05-17 | End: 2022-10-27 | Stop reason: SDUPTHER

## 2022-06-10 RX ORDER — FUROSEMIDE 20 MG/1
TABLET ORAL
COMMUNITY
Start: 2022-04-30 | End: 2022-07-19

## 2022-06-10 NOTE — PROGRESS NOTES
[address] [place] [address], MD, Rian Ge, Vidya Miri Johnson, [place]      JEM Nix, [facility]     Amna Xavier, [facility]   Sabina Durbin [facility]    Thibodaux Brett, [facility]       Concepcion Deputado [address]an [address] 136    at [address], [address] [place] [address], [facility] 22.    [phone]    FAX: [address] [place] [address], [address]    [phone]    FAX: [phone]       Patient Care Team:  Gaby Linares NP as PCP - General (Nurse Practitioner)  Ariel Calvin MD (Gastroenterology)  Alina Abbasi MD (Internal Medicine Physician)      Problem List  Date Reviewed: 6/10/2022          Codes Class Noted    Type 2 diabetes mellitus ([facility] 75.) ICD-10-CM: E11.9  ICD-9-CM: 250.00  Unknown        GERD (gastroesophageal reflux disease) ICD-10-CM: K21.9  ICD-9-CM: 530.81  Unknown        Lupus ([facility].) ICD-10-CM: M32.9  ICD-9-CM: 710.0  Unknown    Overview Signed 6/10/2022 10:51 AM by Stephanie Ruiz MD     lupus             History of cholecystectomy ICD-10-CM: Z90.49  ICD-9-CM: V45.79  Unknown        H/O lumbosacral spine surgery ICD-10-CM: Z98.890  ICD-9-CM: V15.29  Unknown        Autoimmune hepatitis ([facility].) ICD-10-CM: K75.4  ICD-9-CM: 571.42  2021              The clinicians listed above have asked me to see Ngoc Rouse in consultation regarding autoimmune hepatitis. All medical records sent by the referring physicians were reviewed including imaging studies and pathology. The patient is a 77 y.o.  female who was found to have abnormalities in liver transaminases and an elevation in TBILI to 3.2 mg in 5/2021.       Serologic evaluation for markers of chronic liver disease was positive for ASMA,     The most recent imaging of the liver was Ultrasound and MRI performed in 5 and 6/2021. Results suggest chronic liver disease. A liver biopsy was performed in 11/2021. This demonstrated severe portal inflammation with interface inflammation, PMN and stage 1-2 fibrosis. The patient has been treated with budesonide 9 mg. The treatment was successful with normalization of serum liver enzymes but she developed hyperglycemia and lower extremity swelling. The treatment was then stopped. Assessment of liver fibrosis with Fibroscan was performed in the office today. The result was 55.6 kPa which correlates with cirrhosis. The patient has the following symptoms which could be attributed to the liver disorder:    fatigue,     The patient is not experiencing the following symptoms which are commonly seen in this liver disorder:   pain in the right side over the liver,     The patient has Moderate limitations in functional activities which can be attributed to the liver disease       ASSESSMENT AND PLAN:  Autoimmune Hepatitis   The diagnosis was based upon serology, liver biopsy, other coexistant autoimmune disorders    A liver biopsy performed in 11/2021 shows severe inflammation with interface hepatitis, with piecemeal necrosis and stage 2-3 fibrosis    Fibroscan performed in 6/2022 demonstrated a liver stiffness of 55.6 consistent with stage 4 fibrosis. The liver stiffness may overestimate the degree of fibrosis given the severe inflammation present in the liver biopsy which can increase liver stiffness. The patient was treated with budesonide 9 mg every day. She developed swelling of the lower extremities from this and stopped treatment. Despite being off immune suppression for several months AST is elevated. ALT is normal.  ALP is elevated. Liver function is depressed. Total bilirubin is elevated. Serum albumin is depressed. INR is prolonged. The platelet count is   depressed.        Based upon laboratory studies, Fibroscan, and imaging the patient may have cirrhosis. Will perform laboratory testing to monitor liver function and degree of liver injury. This will include BMP, hepatic panel, CBC with platelet count, INR. Will perform serologic and virologic studies to assess for other causes of chronic liver disease. Will perform imaging of the liver with ultrasound. There is no reason to repeat the liver biopsy at this time. Will perform imaging of the liver with ultrasound. Screening for Hepatocellular Carcinoma  [facility]. screening has not been not been performed since 6/2021. AFP was ordered today and ultrasound will be scheduled. Treatment of other medical problems in patients with chronic liver disease  There are no contraindications for the patient to take most medications that are necessary for treatment of other medical issues. The patient has cirrhrosis and should avoid taking NSAIDs which are associated with a higher rate of developing SAMMY. The patient can take any medications utilized for treatment of DM, statins to treat hypercholesterolemia    Normal doses of acetaminophen, as recommended on the label of the bottle, are not hepatotoxic except in the setting of daily alcohol use, even in patients with cirrhosis and can be utilized for pain. Counseling for alcohol in patients with chronic liver disease  The patient was counseled regarding alcohol consumption and the effect of alcohol on chronic liver disease. The patient does not consume any significant amount of alcohol. Vaccinations   The need for vaccination against viral hepatitis A and B will be assessed with serologic and instituted as appropriate. The patient has received 2 doses of COVID-19 vaccine. Routine vaccinations against other bacterial and viral agents can be performed as indicated. Annual flu vaccination should be administered if indicated.         ALLERGIES  Allergies   Allergen Reactions    Budesonide Other (comments)     Caused GI bleed and Glucose to spike over 200    Pcn [Penicillins] Rash       MEDICATIONS  Current Outpatient Medications   Medication Sig    furosemide (LASIX) 20 mg tablet TAKE 1-2 TABLETS ORALLY ONCE A DAY AS NEEDED EDEMA 30 DAYS    glimepiride (AMARYL) 1 mg tablet TAKE 1 TABLET BY MOUTH TWICE A DAY FOR 30 DAYS    pantoprazole (Protonix) 40 mg tablet Take 1 Tablet by mouth daily.  hydrOXYchloroQUINE (PlaqueniL) 200 mg tablet Take 200 mg by mouth daily.  Klor-Con M20 20 mEq tablet  (Patient not taking: Reported on 6/10/2022)    dilTIAZem IR (Cardizem) 30 mg tablet Take 30 mg by mouth four (4) times daily. (Patient not taking: Reported on 6/10/2022)    melatonin 3 mg tablet Take 1 Tablet by mouth nightly as needed for Sleep. (Patient not taking: Reported on 6/10/2022)    polyethylene glycol (MIRALAX) 17 gram packet Take 1 Packet by mouth daily. (Patient not taking: Reported on 6/10/2022)     No current facility-administered medications for this visit. SYSTEM REVIEW NOT RELATED TO LIVER DISEASE OR REVIEWED ABOVE:  Constitution systems: Negative for fever, chills, weight gain, weight loss. Eyes: Negative for visual changes. ENT: Negative for sore throat, painful swallowing. Respiratory: Negative for cough, hemoptysis, SOB. Cardiology: Negative for chest pain, palpitations. GI:  Negative for constipation or diarrhea. : Negative for urinary frequency, dysuria, hematuria, nocturia. Skin: Negative for rash. Hematology: Negative for easy bruising, blood clots. Musculo-skelatal: Negative for back pain, muscle pain, weakness. Neurologic: Negative for headaches, dizziness, vertigo, memory problems not related to HE. Psychology: Negative for anxiety, depression. FAMILY HISTORY:  The father  of MI. The mother  at age 80 years. There is no family history of liver disease. SOCIAL HISTORY:  The patient is .     The patient has 1 child and 1 grandchild  The patient has never used tobacco products. The patient has previously consumed 1 alcoholic beverages per day   The patient has been abstinent from alcohol since 2021 when she first developed AIH. The patient used to work as . PHYSICAL EXAMINATION:  Visit Vitals  BP (!) 153/78 (BP 1 Location: Left upper arm, BP Patient Position: Sitting, BP Cuff Size: Adult)   Pulse (!) 106   Temp (!) 96.5 °F (35.8 °C) (Temporal)   Resp 16   Ht 5' (1.524 m)   Wt 145 lb (65.8 kg)   SpO2 98%   BMI 28.32 kg/m²     General: No acute distress. Eyes: Sclera anicteric. ENT: No oral lesions. Thyroid normal.  Nodes: No adenopathy. Skin: No spider angiomata. No jaundice. No palmar erythema. Respiratory: Lungs clear to auscultation. Cardiovascular: Regular heart rate. No murmurs. No JVD. Abdomen: Soft non-tender. Liver size normal to percussion/palpation. Spleen not palpable. No obvious ascites. Extremities: No edema. No muscle wasting. No gross arthritic changes. Neurologic: Alert and oriented. Cranial nerves grossly intact. No asterixis.     LABORATORY STUDIES:  Liver [place] of [address] & Units 6/10/2022 12/10/2021   WBC 3.6 - 11.0 K/uL 6.2    ANC 1.8 - 8.0 K/UL 4.6    HGB 11.5 - 16.0 g/dL 11.4 (L) 11.0 (L)    - 400 K/uL 141 (L)    INR 0.9 - 1.1   1.2 (H)    AST 15 - 37 U/L 52 (H) 271 (H)   ALT 12 - 78 U/L 35 246 (H)   Alk Phos 45 - 117 U/L 174 (H) 148 (H)   Bili, Total 0.2 - 1.0 MG/DL 1.7 (H) 3.2 (H)   Bili, Direct 0.0 - 0.2 MG/DL 0.9 (H)    Albumin 3.5 - 5.0 g/dL 2.7 (L) 1.5 (L)   BUN 6 - 20 MG/DL 15 19   Creat 0.55 - 1.02 MG/DL 1.07 (H) 0.72   Na 136 - 145 mmol/L 140 136   K 3.5 - 5.1 mmol/L 3.9 4.2   Cl 97 - 108 mmol/L 108 109 (H)   CO2 21 - 32 mmol/L 27 22   Glucose 65 - 100 mg/dL 215 (H) 89     SEROLOGIES:  Serologies Latest Ref Rng & Units 6/10/2022   NIMO, IFA  Negative   ASMCA 0 - 19 Units 57 (H)   Anti-SLA 0.0 - 20.0 units 1.1 LKM 0.0 - 20.0 Units 1.0       LIVER HISTOLOGY:  11/2021. Slides reviewed by MLS. Severe hepatitis. severe portal inflammation, with severe interface hepatitis, with moderate PMN. Bile ducts are normal.  moderate lobular inflammation. no steatosis. The degree of fibrosis is difficult to estimate given the severe diffuse inflammation. Best estimate of fbrosis is stage 2-3. Knodell score (9344). 6/2022. Fib[facility]Scan [facility]. EkPa was 55.6. IQR/med 64%. . The results suggested a fibrosis level of F4. The high IQR% suggests that the measurement may not be reliable. The CAP score suggests  there is no significant hepatic steatosis. ENDOSCOPIC PROCEDURES:  Not available or performed    RADIOLOGY:  5/2021. Ultrasound of liver. Echogenic consistent with chronic liver disease. No liver mass lesions. No dilated bile ducts. No ascites. 6/2021. Dynamic MRI of liver. Normal appearing liver. No liver mass lesions. Normal spleen. Mild dilated bile ducts normal for post-cholecystectomy. No bile duct strictures. No CBD stones. No ascites. OTHER TESTING:  Not available or performed    FOLLOW-UP:  All of the issues listed above in the Assessment and Plan were discussed with the patient. All questions were answered. The patient expressed a clear understanding of the above. [address] [place] [address] in 2 weeks to review all data and determine the treatment plan.       MD Munir Lomeli 13  [address] [place] [address] A, [address] [place] [address] 22.  [phone]  [address] [place] [address]

## 2022-06-10 NOTE — Clinical Note
6/26/2022    Patient: Kelby Gonzlaes   YOB: 1945   Date of Visit: 6/10/2022     Carie Mitchell NP  [facility], [facility] [address]  Via Fax: [phone]    Dear Carie Mitchell NP,      Thank you for referring Ms. Deneise Litten [facility]. My notes for this consultation are attached. If you have questions, please do not hesitate to call me. I look forward to following your patient along with you.       Sincerely,    Lux Aranda MD

## 2022-06-10 NOTE — PROGRESS NOTES
Identified pt with two pt identifiers(name and ). Reviewed record in preparation for visit and have obtained necessary documentation. Chief Complaint   Patient presents with    New Patient     Establish care      Vitals:    06/10/22 0953   BP: (!) 153/78   Pulse: (!) 106   Resp: 16   Temp: (!) 96.5 °F (35.8 °C)   TempSrc: Temporal   SpO2: 98%   Weight: 145 lb (65.8 kg)   Height: 5' (1.524 m)   PainSc:   0 - No pain       Health Maintenance Review: Patient reminded of \"due or due soon\" health maintenance. I have asked the patient to contact his/her primary care provider (PCP) for follow-up on his/her health maintenance. Coordination of Care Questionnaire:  :   1) Have you been to an emergency room, urgent care, or hospitalized since your last visit? If yes, where when, and reason for visit? no       2. Have seen or consulted any other health care provider since your last visit? If yes, where when, and reason for visit? NO      Patient is accompanied by daughter I have received verbal consent from [employer] to discuss any/all medical information while they are present in the room.

## 2022-06-11 LAB
ALBUMIN SERPL-MCNC: 2.7 G/DL (ref 3.5–5)
ALBUMIN/GLOB SERPL: 0.5 {RATIO} (ref 1.1–2.2)
ALP SERPL-CCNC: 174 U/L (ref 45–117)
ALT SERPL-CCNC: 35 U/L (ref 12–78)
ANION GAP SERPL CALC-SCNC: 5 MMOL/L (ref 5–15)
AST SERPL-CCNC: 52 U/L (ref 15–37)
BASOPHILS # BLD: 0.1 K/UL (ref 0–0.1)
BASOPHILS NFR BLD: 1 % (ref 0–1)
BILIRUB DIRECT SERPL-MCNC: 0.9 MG/DL (ref 0–0.2)
BILIRUB SERPL-MCNC: 1.7 MG/DL (ref 0.2–1)
BUN SERPL-MCNC: 15 MG/DL (ref 6–20)
BUN/CREAT SERPL: 14 (ref 12–20)
CALCIUM SERPL-MCNC: 9.1 MG/DL (ref 8.5–10.1)
CHLORIDE SERPL-SCNC: 108 MMOL/L (ref 97–108)
CO2 SERPL-SCNC: 27 MMOL/L (ref 21–32)
CREAT SERPL-MCNC: 1.07 MG/DL (ref 0.55–1.02)
DIFFERENTIAL METHOD BLD: ABNORMAL
EOSINOPHIL # BLD: 0.2 K/UL (ref 0–0.4)
EOSINOPHIL NFR BLD: 3 % (ref 0–7)
ERYTHROCYTE [DISTWIDTH] IN BLOOD BY AUTOMATED COUNT: 15 % (ref 11.5–14.5)
GLOBULIN SER CALC-MCNC: 5.3 G/DL (ref 2–4)
GLUCOSE SERPL-MCNC: 215 MG/DL (ref 65–100)
HCT VFR BLD AUTO: 36.4 % (ref 35–47)
HGB BLD-MCNC: 11.4 G/DL (ref 11.5–16)
IMM GRANULOCYTES # BLD AUTO: 0.1 K/UL (ref 0–0.04)
IMM GRANULOCYTES NFR BLD AUTO: 1 % (ref 0–0.5)
INR PPP: 1.2 (ref 0.9–1.1)
LYMPHOCYTES # BLD: 0.7 K/UL (ref 0.8–3.5)
LYMPHOCYTES NFR BLD: 11 % (ref 12–49)
MCH RBC QN AUTO: 30.4 PG (ref 26–34)
MCHC RBC AUTO-ENTMCNC: 31.3 G/DL (ref 30–36.5)
MCV RBC AUTO: 97.1 FL (ref 80–99)
MONOCYTES # BLD: 0.5 K/UL (ref 0–1)
MONOCYTES NFR BLD: 8 % (ref 5–13)
NEUTS SEG # BLD: 4.6 K/UL (ref 1.8–8)
NEUTS SEG NFR BLD: 76 % (ref 32–75)
NRBC # BLD: 0 K/UL (ref 0–0.01)
NRBC BLD-RTO: 0 PER 100 WBC
PLATELET # BLD AUTO: 141 K/UL (ref 150–400)
PMV BLD AUTO: 11.3 FL (ref 8.9–12.9)
POTASSIUM SERPL-SCNC: 3.9 MMOL/L (ref 3.5–5.1)
PROT SERPL-MCNC: 8 G/DL (ref 6.4–8.2)
PROTHROMBIN TIME: 12.8 SEC (ref 9–11.1)
RBC # BLD AUTO: 3.75 M/UL (ref 3.8–5.2)
RBC MORPH BLD: ABNORMAL
RBC MORPH BLD: ABNORMAL
SODIUM SERPL-SCNC: 140 MMOL/L (ref 136–145)
WBC # BLD AUTO: 6.2 K/UL (ref 3.6–11)

## 2022-06-14 LAB
AFP L3 MFR SERPL: 8.3 % (ref 0–9.9)
AFP SERPL-MCNC: 4.7 NG/ML (ref 0–9.2)
LKM-1 AB SER-ACNC: 1 UNITS (ref 0–20)
SMA IGG SER-ACNC: 57 UNITS (ref 0–19)

## 2022-06-15 LAB — SOLUBLE LIVER IGG SER IA-ACNC: 1.1 UNITS (ref 0–20)

## 2022-06-16 LAB — ANA TITR SER IF: NEGATIVE {TITER}

## 2022-06-24 ENCOUNTER — OFFICE VISIT (OUTPATIENT)
Dept: HEMATOLOGY | Age: 77
End: 2022-06-24
Payer: MEDICARE

## 2022-06-24 VITALS
HEIGHT: 60 IN | HEART RATE: 99 BPM | DIASTOLIC BLOOD PRESSURE: 76 MMHG | RESPIRATION RATE: 16 BRPM | WEIGHT: 141 LBS | SYSTOLIC BLOOD PRESSURE: 143 MMHG | BODY MASS INDEX: 27.68 KG/M2 | TEMPERATURE: 96.7 F | OXYGEN SATURATION: 99 %

## 2022-06-24 DIAGNOSIS — K75.4 AUTOIMMUNE HEPATITIS (HCC): Primary | ICD-10-CM

## 2022-06-24 DIAGNOSIS — K74.60 CIRRHOSIS OF LIVER WITHOUT ASCITES, UNSPECIFIED HEPATIC CIRRHOSIS TYPE (HCC): ICD-10-CM

## 2022-06-24 PROCEDURE — G8510 SCR DEP NEG, NO PLAN REQD: HCPCS | Performed by: INTERNAL MEDICINE

## 2022-06-24 PROCEDURE — 1101F PT FALLS ASSESS-DOCD LE1/YR: CPT | Performed by: INTERNAL MEDICINE

## 2022-06-24 PROCEDURE — G0463 HOSPITAL OUTPT CLINIC VISIT: HCPCS | Performed by: INTERNAL MEDICINE

## 2022-06-24 PROCEDURE — G8400 PT W/DXA NO RESULTS DOC: HCPCS | Performed by: INTERNAL MEDICINE

## 2022-06-24 PROCEDURE — 99214 OFFICE O/P EST MOD 30 MIN: CPT | Performed by: INTERNAL MEDICINE

## 2022-06-24 PROCEDURE — G8417 CALC BMI ABV UP PARAM F/U: HCPCS | Performed by: INTERNAL MEDICINE

## 2022-06-24 PROCEDURE — G8427 DOCREV CUR MEDS BY ELIG CLIN: HCPCS | Performed by: INTERNAL MEDICINE

## 2022-06-24 PROCEDURE — 1123F ACP DISCUSS/DSCN MKR DOCD: CPT | Performed by: INTERNAL MEDICINE

## 2022-06-24 PROCEDURE — 3017F COLORECTAL CA SCREEN DOC REV: CPT | Performed by: INTERNAL MEDICINE

## 2022-06-24 PROCEDURE — G8536 NO DOC ELDER MAL SCRN: HCPCS | Performed by: INTERNAL MEDICINE

## 2022-06-24 PROCEDURE — 1090F PRES/ABSN URINE INCON ASSESS: CPT | Performed by: INTERNAL MEDICINE

## 2022-06-24 RX ORDER — MYCOPHENOLATE MOFETIL 500 MG/1
500 TABLET ORAL 2 TIMES DAILY
Qty: 60 TABLET | Refills: 3 | Status: SHIPPED | OUTPATIENT
Start: 2022-06-24 | End: 2022-09-22 | Stop reason: SDUPTHER

## 2022-06-24 RX ORDER — OMEPRAZOLE 40 MG/1
40 CAPSULE, DELAYED RELEASE ORAL DAILY
Qty: 90 CAPSULE | Refills: 3 | Status: SHIPPED | OUTPATIENT
Start: 2022-06-24 | End: 2022-08-07

## 2022-06-24 RX ORDER — FUROSEMIDE 40 MG/1
40 TABLET ORAL DAILY
Qty: 90 TABLET | Refills: 3 | Status: SHIPPED | OUTPATIENT
Start: 2022-06-24 | End: 2022-09-22 | Stop reason: SDUPTHER

## 2022-06-24 RX ORDER — SPIRONOLACTONE 100 MG/1
100 TABLET, FILM COATED ORAL DAILY
Qty: 90 TABLET | Refills: 3 | Status: SHIPPED | OUTPATIENT
Start: 2022-06-24 | End: 2022-09-22 | Stop reason: SDUPTHER

## 2022-06-24 NOTE — Clinical Note
6/26/2022    Patient: Parveen Garcia   YOB: 1945   Date of Visit: 6/24/2022     Ileana Garcia NP  [address] [phone]  Via Fax: [phone]    Dear Ileana Garcia NP,      Thank you for referring Ms. Melvin Mccormack [facility]  [facility]. My notes for this consultation are attached. If you have questions, please do not hesitate to call me. I look forward to following your patient along with you.       Sincerely,    Guillermo Yee MD

## 2022-06-24 NOTE — PROGRESS NOTES
[address], [place], [address], [place], [place]      JEM Arvizu, [facility]-BC     Amna Xavier, [facility]   VINCENT SoaresP-MAYRA Plata, [facility]       Concepcion Hackett Adan [address]    at [address], [address] [place] [address], [facility] 22.    [phone]    FAX: [address] [place] [address]    at [address], [address]    [phone]    FAX: [phone]       Patient Care Team:  Stew Veliz NP as PCP - General (Nurse Practitioner)  Dominik Huertas MD (Gastroenterology)  Jose L Vargas MD (Internal Medicine Physician)      Problem List  Date Reviewed: 6/26/2022          Codes Class Noted    Cirrhosis of liver without ascites [facility]) ICD-10-CM: K74.60  ICD-9-CM: 571.5  6/24/2022        Type 2 diabetes mellitus ([facility] ICD-10-CM: E11.9  ICD-9-CM: 250.00  Unknown        GERD (gastroesophageal reflux disease) ICD-10-CM: K21.9  ICD-9-CM: 530.81  Unknown        Lupus ([facility] ICD-10-CM: M32.9  ICD-9-CM: 710.0  Unknown    Overview Signed 6/10/2022 10:51 AM by Yosef Brand MD     lupus             History of cholecystectomy ICD-10-CM: Z90.49  ICD-9-CM: V45.79  Unknown        H/O lumbosacral spine surgery ICD-10-CM: Z98.890  ICD-9-CM: V15.29  Unknown        Autoimmune hepatitis ([facility] ICD-10-CM: K75.4  ICD-9-CM: 571.42  2021              Nick Pantoja is being seen at [address] for management of 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 The active problem list, all pertinent past medical history, medications, liver histology, endoscopic studies, radiologic findings and laboratory findings related to the liver disorder were reviewed and discussed with the patient. The patient is a 77 y.o.   female who was found to have abnormalities in liver transaminases and an elevation in TBILI to 3.2 mg in 5/2021. Serologic evaluation for markers of chronic liver disease was positive for ASMA,     The most recent imaging of the liver was Ultrasound and MRI performed in 5 and 6/2021. Results suggest chronic liver disease. Since the initial appointment I have obtained and personally reviewed the liver biopsy performed in 11/2021. This demonstrated severe portal inflammation with interface inflammation, PMN and stage and bridging necrosis, and possibly lobular collapse. Given the intense inflammation the degree of fibrosis was difficult to assess. Best guess is stage 3 fibrosis. The patient was treated with budesonide 9 mg. She developed lower edema and stopped treatment. The treatment was successful with marked decline in liver enzymes which have suprprizingly remained down even though she stopped treatment. Fibroscan performed in 6/2022 was 55.6 kPa which correlates with cirrhosis. The patient has the following symptoms which could be attributed to the liver disorder:    fatigue,     The patient is not experiencing the following symptoms which are commonly seen in this liver disorder:   pain in the right side over the liver,     The patient has Moderate limitations in functional activities which can be attributed to the liver disease       ASSESSMENT AND PLAN:  Autoimmune Hepatitis   The diagnosis was based upon serology, liver biopsy, other coexistant autoimmune disorders    A liver biopsy performed in 11/2021 shows severe inflammation, interface hepatitis, piecemeal necrosis, bridign necrosis, possibly lobular collapse, and stage 3 fibrosis    Fibroscan performed in 6/2022 demonstrated a liver stiffness of 55.6 consistent with stage 4 fibrosis.   The liver stiffness may overestimate the degree of fibrosis given the severe inflammation present in the liver biopsy which can increase liver stiffness. The patient was treated with budesonide 9 mg every day. She developed swelling of the lower extremities from this and stopped treatment. The patient needs immune suppression. The choice are to place her back on budesonide along with high dosecellcept or to use tacrolimus. Given that the Scr is elevated will use budesonide 9 mg every day and cellcept. If we are getting a good biochemical response will reduce budesonide to 6 mg every day and then to 3 mg every day if possible. Despite being off immune suppression for several months AST is elevated. ALT is normal.  ALP is elevated. Liver function is depressed. Total bilirubin is elevated. Serum albumin is depressed. INR is prolonged. The platelet count is   depressed. Based upon laboratory studies, Fibroscan, and imaging the patient may have cirrhosis. Will perform laboratory testing to monitor liver function and degree of liver injury. This will include BMP, hepatic panel, CBC with platelet count, INR. Will perform serologic and virologic studies to assess for other causes of chronic liver disease. Will perform imaging of the liver with ultrasound. There is no reason to repeat the liver biopsy at this time. Will perform imaging of the liver with ultrasound. Screening for Hepatocellular Carcinoma  [facility] [address] screening has not been not been performed since 6/2021. AFP was ordered today and ultrasound will be scheduled. Treatment of other medical problems in patients with chronic liver disease  There are no contraindications for the patient to take most medications that are necessary for treatment of other medical issues. The patient has cirrhrosis and should avoid taking NSAIDs which are associated with a higher rate of developing SAMMY.         The patient can take any medications utilized for treatment of DM, statins to treat hypercholesterolemia    Normal doses of acetaminophen, as recommended on the label of the bottle, are not hepatotoxic except in the setting of daily alcohol use, even in patients with cirrhosis and can be utilized for pain. Counseling for alcohol in patients with chronic liver disease  The patient was counseled regarding alcohol consumption and the effect of alcohol on chronic liver disease. The patient does not consume any significant amount of alcohol. Vaccinations   The need for vaccination against viral hepatitis A and B will be assessed with serologic and instituted as appropriate. The patient has received 2 doses of COVID-19 vaccine. Routine vaccinations against other bacterial and viral agents can be performed as indicated. Annual flu vaccination should be administered if indicated. ALLERGIES  Allergies   Allergen Reactions    Budesonide Other (comments)     Caused GI bleed and Glucose to spike over 200    Pcn [Penicillins] Rash       MEDICATIONS  Current Outpatient Medications   Medication Sig    budesonide 9 mg cpER Take 9 mg by mouth daily.  mycophenolate (CellCept) 500 mg tablet Take 1 Tablet by mouth two (2) times a day.  furosemide (Lasix) 40 mg tablet Take 1 Tablet by mouth daily.  spironolactone (Aldactone) 100 mg tablet Take 1 Tablet by mouth daily.  omeprazole (PRILOSEC) 40 mg capsule Take 1 Capsule by mouth daily.  furosemide (LASIX) 20 mg tablet TAKE 1-2 TABLETS ORALLY ONCE A DAY AS NEEDED EDEMA 30 DAYS    glimepiride (AMARYL) 1 mg tablet Take 1 mg by mouth daily.  pantoprazole (Protonix) 40 mg tablet Take 1 Tablet by mouth daily.  hydrOXYchloroQUINE (PlaqueniL) 200 mg tablet Take 200 mg by mouth daily. No current facility-administered medications for this visit. SYSTEM REVIEW NOT RELATED TO LIVER DISEASE OR REVIEWED ABOVE:  Constitution systems: Negative for fever, chills, weight gain, weight loss. Eyes: Negative for visual changes. ENT: Negative for sore throat, painful swallowing.    Respiratory: Negative for cough, hemoptysis, SOB. Cardiology: Negative for chest pain, palpitations. GI:  Negative for constipation or diarrhea. : Negative for urinary frequency, dysuria, hematuria, nocturia. Skin: Negative for rash. Hematology: Negative for easy bruising, blood clots. Musculo-skelatal: Negative for back pain, muscle pain, weakness. Neurologic: Negative for headaches, dizziness, vertigo, memory problems not related to HE. Psychology: Negative for anxiety, depression. FAMILY HISTORY:  The father  of MI. The mother  at age 80 years. There is no family history of liver disease. SOCIAL HISTORY:  The patient is . The patient has 1 child and 1 grandchild  The patient has never used tobacco products. The patient has previously consumed 1 alcoholic beverages per day   The patient has been abstinent from alcohol since  when she first developed AIH. The patient used to work as . PHYSICAL EXAMINATION:  Visit Vitals  BP (!) 143/76 (BP 1 Location: Left upper arm, BP Patient Position: Sitting, BP Cuff Size: Adult)   Pulse 99   Temp (!) 96.7 °F (35.9 °C) (Temporal)   Resp 16   Ht 5' (1.524 m)   Wt 141 lb (64 kg)   SpO2 99%   BMI 27.54 kg/m²     General: No acute distress. Eyes: Sclera anicteric. ENT: No oral lesions. Thyroid normal.  Nodes: No adenopathy. Skin: No spider angiomata. No jaundice. No palmar erythema. Respiratory: Lungs clear to auscultation. Cardiovascular: Regular heart rate. No murmurs. No JVD. Abdomen: Soft non-tender. Liver size normal to percussion/palpation. Spleen not palpable. No obvious ascites. Extremities: No edema. No muscle wasting. No gross arthritic changes. Neurologic: Alert and oriented. Cranial nerves grossly intact. No asterixis.     LABORATORY STUDIES:  Liver [place] [address] & Units 2022 6/10/2022   WBC 3.6 - 11.0 K/uL  6.2   ANC 1.8 - 8.0 K/UL  4.6   HGB 11.5 - 16.0 g/dL  11.4 (L)  - 400 K/uL  141 (L)   INR 0.9 - 1.1    1.2 (H)   AST 15 - 37 U/L 58 (H) 52 (H)   ALT 12 - 78 U/L 38 35   Alk Phos 45 - 117 U/L 168 (H) 174 (H)   Bili, Total 0.2 - 1.0 MG/DL 1.8 (H) 1.7 (H)   Bili, Direct 0.0 - 0.2 MG/DL  0.9 (H)   Albumin 3.5 - 5.0 g/dL 2.9 (L) 2.7 (L)   BUN 6 - 20 MG/DL 12 15   Creat 0.55 - 1.02 MG/DL 1.06 (H) 1.07 (H)   Na 136 - 145 mmol/L 143 140   K 3.5 - 5.1 mmol/L 4.0 3.9   Cl 97 - 108 mmol/L 108 108   CO2 21 - 32 mmol/L 29 27   Glucose 65 - 100 mg/dL 119 (H) 215 (H)     SEROLOGIES:  Serologies Latest Ref Rng & Units 6/10/2022   NIMO, IFA  Negative   ASMCA 0 - 19 Units 57 (H)   Anti-SLA 0.0 - 20.0 units 1.1   LKM 0.0 - 20.0 Units 1.0       LIVER HISTOLOGY:  11/2021. Slides reviewed by MLS. Severe hepatitis. severe portal inflammation, with severe interface hepatitis, with moderate PMN. Bile ducts are normal.  moderate lobular inflammation. no steatosis. The degree of fibrosis is difficult to estimate given the severe diffuse inflammation. Best estimate of fbrosis is stage 2-3. Knodell score (0500). 6/2022. FibroScan performed at [address]. EkPa was 55.6. IQR/med 64%. . The results suggested a fibrosis level of F4. The high IQR% suggests that the measurement may not be reliable. The CAP score suggests  there is no significant hepatic steatosis. ENDOSCOPIC PROCEDURES:  Not available or performed    RADIOLOGY:  5/2021. Ultrasound of liver. Echogenic consistent with chronic liver disease. No liver mass lesions. No dilated bile ducts. No ascites. 6/2021. Dynamic MRI of liver. Normal appearing liver. No liver mass lesions. Normal spleen. Mild dilated bile ducts normal for post-cholecystectomy. No bile duct strictures. No CBD stones. No ascites. OTHER TESTING:  Not available or performed    FOLLOW-UP:  All of the issues listed above in the Assessment and Plan were discussed with the patient. All questions were answered.   The patient expressed a clear understanding of the above. [address] [address] in 4 weeks to review all data and determine the treatment plan.       MD Vish Goff[address], [address] [place] [address] 22.  [phone]  [address] [place] [address]

## 2022-06-24 NOTE — PROGRESS NOTES
Identified pt with two pt identifiers(name and ). Reviewed record in preparation for visit and have obtained necessary documentation. Chief Complaint   Patient presents with    Hepatitis     2 week f/u      Vitals:    22 1240   BP: (!) 143/76   Pulse: 99   Resp: 16   Temp: (!) 96.7 °F (35.9 °C)   TempSrc: Temporal   SpO2: 99%   Weight: 141 lb (64 kg)   Height: 5' (1.524 m)   PainSc:   0 - No pain       Health Maintenance Review: Patient reminded of \"due or due soon\" health maintenance. I have asked the patient to contact his/her primary care provider (PCP) for follow-up on his/her health maintenance. Coordination of Care Questionnaire:  :   1) Have you been to an emergency room, urgent care, or hospitalized since your last visit? If yes, where when, and reason for visit? no       2. Have seen or consulted any other health care provider since your last visit? If yes, where when, and reason for visit? NO      Patient is accompanied by daughter I have received verbal consent from Shazia Rinaldi to discuss any/all medical information while they are present in the room.

## 2022-06-25 LAB
ALBUMIN SERPL-MCNC: 2.9 G/DL (ref 3.5–5)
ALBUMIN/GLOB SERPL: 0.5 {RATIO} (ref 1.1–2.2)
ALP SERPL-CCNC: 168 U/L (ref 45–117)
ALT SERPL-CCNC: 38 U/L (ref 12–78)
ANION GAP SERPL CALC-SCNC: 6 MMOL/L (ref 5–15)
AST SERPL-CCNC: 58 U/L (ref 15–37)
BILIRUB SERPL-MCNC: 1.8 MG/DL (ref 0.2–1)
BUN SERPL-MCNC: 12 MG/DL (ref 6–20)
BUN/CREAT SERPL: 11 (ref 12–20)
CALCIUM SERPL-MCNC: 9.2 MG/DL (ref 8.5–10.1)
CHLORIDE SERPL-SCNC: 108 MMOL/L (ref 97–108)
CO2 SERPL-SCNC: 29 MMOL/L (ref 21–32)
CREAT SERPL-MCNC: 1.06 MG/DL (ref 0.55–1.02)
GLOBULIN SER CALC-MCNC: 5.5 G/DL (ref 2–4)
GLUCOSE SERPL-MCNC: 119 MG/DL (ref 65–100)
POTASSIUM SERPL-SCNC: 4 MMOL/L (ref 3.5–5.1)
PROT SERPL-MCNC: 8.4 G/DL (ref 6.4–8.2)
SODIUM SERPL-SCNC: 143 MMOL/L (ref 136–145)

## 2022-06-30 NOTE — TELEPHONE ENCOUNTER
Patient called indicating that per the pharmacy, the patient's prescription for budesonide 9 mg will cost over $1000 at 9 mg. She is requesting  per the pharmacy advice that the provider change the prescription to three milligrams three times daily instead of the 9 mg prescription so she can afford it.

## 2022-07-03 ENCOUNTER — HOSPITAL ENCOUNTER (EMERGENCY)
Age: 77
Discharge: HOME | End: 2022-07-03
Attending: STUDENT IN AN ORGANIZED HEALTH CARE EDUCATION/TRAINING PROGRAM
Payer: MEDICARE

## 2022-07-03 ENCOUNTER — APPOINTMENT (OUTPATIENT)
Dept: ULTRASOUND IMAGING | Age: 77
End: 2022-07-03
Attending: STUDENT IN AN ORGANIZED HEALTH CARE EDUCATION/TRAINING PROGRAM
Payer: MEDICARE

## 2022-07-03 VITALS
SYSTOLIC BLOOD PRESSURE: 139 MMHG | HEART RATE: 77 BPM | DIASTOLIC BLOOD PRESSURE: 71 MMHG | TEMPERATURE: 97.8 F | OXYGEN SATURATION: 94 % | RESPIRATION RATE: 19 BRPM

## 2022-07-03 DIAGNOSIS — N93.9 VAGINAL BLEEDING: ICD-10-CM

## 2022-07-03 DIAGNOSIS — R18.8 OTHER ASCITES: Primary | ICD-10-CM

## 2022-07-03 DIAGNOSIS — D25.9 UTERINE LEIOMYOMA, UNSPECIFIED LOCATION: ICD-10-CM

## 2022-07-03 DIAGNOSIS — N83.201 CYST OF RIGHT OVARY: ICD-10-CM

## 2022-07-03 LAB
ALBUMIN SERPL-MCNC: 3 G/DL (ref 3.5–5)
ALBUMIN/GLOB SERPL: 0.5 {RATIO} (ref 1.1–2.2)
ALP SERPL-CCNC: 168 U/L (ref 45–117)
ALT SERPL-CCNC: 41 U/L (ref 12–78)
ANION GAP SERPL CALC-SCNC: 7 MMOL/L (ref 5–15)
AST SERPL-CCNC: 42 U/L (ref 15–37)
BASOPHILS # BLD: 0.1 K/UL (ref 0–0.1)
BASOPHILS NFR BLD: 0 % (ref 0–1)
BILIRUB SERPL-MCNC: 2 MG/DL (ref 0.2–1)
BUN SERPL-MCNC: 14 MG/DL (ref 6–20)
BUN/CREAT SERPL: 13 (ref 12–20)
CALCIUM SERPL-MCNC: 9.4 MG/DL (ref 8.5–10.1)
CHLORIDE SERPL-SCNC: 102 MMOL/L (ref 97–108)
CO2 SERPL-SCNC: 27 MMOL/L (ref 21–32)
COMMENT, HOLDF: NORMAL
CREAT SERPL-MCNC: 1.12 MG/DL (ref 0.55–1.02)
DIFFERENTIAL METHOD BLD: ABNORMAL
EOSINOPHIL # BLD: 0.2 K/UL (ref 0–0.4)
EOSINOPHIL NFR BLD: 2 % (ref 0–7)
ERYTHROCYTE [DISTWIDTH] IN BLOOD BY AUTOMATED COUNT: 14.9 % (ref 11.5–14.5)
GLOBULIN SER CALC-MCNC: 5.9 G/DL (ref 2–4)
GLUCOSE SERPL-MCNC: 161 MG/DL (ref 65–100)
HCT VFR BLD AUTO: 36.7 % (ref 35–47)
HGB BLD-MCNC: 12 G/DL (ref 11.5–16)
IMM GRANULOCYTES # BLD AUTO: 0.1 K/UL (ref 0–0.04)
IMM GRANULOCYTES NFR BLD AUTO: 0 % (ref 0–0.5)
LYMPHOCYTES # BLD: 1.2 K/UL (ref 0.8–3.5)
LYMPHOCYTES NFR BLD: 11 % (ref 12–49)
MCH RBC QN AUTO: 30.3 PG (ref 26–34)
MCHC RBC AUTO-ENTMCNC: 32.7 G/DL (ref 30–36.5)
MCV RBC AUTO: 92.7 FL (ref 80–99)
MONOCYTES # BLD: 1.3 K/UL (ref 0–1)
MONOCYTES NFR BLD: 11 % (ref 5–13)
NEUTS SEG # BLD: 8.6 K/UL (ref 1.8–8)
NEUTS SEG NFR BLD: 76 % (ref 32–75)
NRBC # BLD: 0 K/UL (ref 0–0.01)
NRBC BLD-RTO: 0 PER 100 WBC
PLATELET # BLD AUTO: 225 K/UL (ref 150–400)
PMV BLD AUTO: 10.7 FL (ref 8.9–12.9)
POTASSIUM SERPL-SCNC: 3.1 MMOL/L (ref 3.5–5.1)
PROT SERPL-MCNC: 8.9 G/DL (ref 6.4–8.2)
RBC # BLD AUTO: 3.96 M/UL (ref 3.8–5.2)
SAMPLES BEING HELD,HOLD: NORMAL
SODIUM SERPL-SCNC: 136 MMOL/L (ref 136–145)
TROPONIN-HIGH SENSITIVITY: 5 NG/L (ref 0–51)
WBC # BLD AUTO: 11.4 K/UL (ref 3.6–11)

## 2022-07-03 PROCEDURE — 84484 ASSAY OF TROPONIN QUANT: CPT

## 2022-07-03 PROCEDURE — 80053 COMPREHEN METABOLIC PANEL: CPT

## 2022-07-03 PROCEDURE — 76856 US EXAM PELVIC COMPLETE: CPT

## 2022-07-03 PROCEDURE — 76830 TRANSVAGINAL US NON-OB: CPT

## 2022-07-03 PROCEDURE — 85025 COMPLETE CBC W/AUTO DIFF WBC: CPT

## 2022-07-03 PROCEDURE — 93005 ELECTROCARDIOGRAM TRACING: CPT

## 2022-07-03 PROCEDURE — 99284 EMERGENCY DEPT VISIT MOD MDM: CPT

## 2022-07-03 NOTE — ED TRIAGE NOTES
Pt states she noted heavy bright red vaginal bleeding earlier today after taking a shower. Has changed her pad 4x since. Hx cirrhosis.     +pain in right lower abdomen    Pt states she checked her BP & HR and her BP was \"high\" and her HR \"was in the 140s\"

## 2022-07-03 NOTE — ED PROVIDER NOTES
Patient is a 30-year-old female present emergency department with vaginal bleeding, palpitations, tachycardia. Patient states that she was at home she was taking a shower and noticed that she was having vaginal bleeding. Subsequently patient was having right lower quadrant abdominal pain. She states that the pain has resolved and her abdomen says that it was a large amount of vaginal bleeding. Patient has a history of autoimmune hepatitis, GERD, lupus recently diagnosed with cirrhosis of the liver established care with Dr. Ann Marie Franco. Patient denies any chest pain but says that her blood pressure and heart rate were extremely high when she was at home got concerned. She denies any dizziness, lightheadedness, shortness of breath nausea or vomiting. The abdominal pain was sharp and stabbing in nature however since being here her abdominal pain is completely resolved.            Past Medical History:   Diagnosis Date    Autoimmune hepatitis ([address] 2021    GERD (gastroesophageal reflux disease)     Lupus (HCC)     lupus       Past Surgical History:   Procedure Laterality Date    [facility]    HX CARPAL TUNNEL RELEASE      HX CHOLECYSTECTOMY      HX ORTHOPAEDIC      back     HX OTHER SURGICAL      gallbladder removal    IR BX LIVER PERCUTANEOUS  11/9/2021    MS BREAST SURGERY PROCEDURE UNLISTED Left     lumpectomy         Family History:   Problem Relation Age of Onset    Cancer Mother         breast    Lupus Mother     No Known Problems Father        Social History     Socioeconomic History    Marital status:      Spouse name: Not on file    Number of children: Not on file    Years of education: Not on file    Highest education level: Not on file   Occupational History    Not on file   Tobacco Use    Smoking status: Never Smoker    Smokeless tobacco: Never Used   Vaping Use    Vaping Use: Never used   Substance and Sexual Activity    Alcohol use: Not Currently     Comment: a glass of wine every 6 months    Drug use: Never    Sexual activity: Not on file   Other Topics Concern    Not on file   Social History Narrative    Not on file     Social Determinants of Health     Financial Resource Strain:     Difficulty of Paying Living Expenses: Not on file   Food Insecurity:     Worried About Running Out of Food in the Last Year: Not on file    Bola of Food in the Last Year: Not on file   Transportation Needs:     Lack of Transportation (Medical): Not on file    Lack of Transportation (Non-Medical): Not on file   Physical Activity:     Days of Exercise per Week: Not on file    Minutes of Exercise per Session: Not on file   Stress:     Feeling of Stress : Not on file   Social Connections:     Frequency of Communication with Friends and Family: Not on file    Frequency of Social Gatherings with Friends and Family: Not on file    Attends Church Services: Not on file    Active Member of [address] [place] [address] or Organizations: Not on file    Attends Club or Organization Meetings: Not on file    Marital Status: Not on file   Intimate Partner Violence:     Fear of Current or Ex-Partner: Not on file    Emotionally Abused: Not on file    Physically Abused: Not on file    Sexually Abused: Not on file   Housing Stability:     Unable to Pay for Housing in the Last Year: Not on file    Number of [place] in the Last Year: Not on file    Unstable Housing in the Last Year: Not on file         ALLERGIES: Budesonide and Pcn [penicillins]    Review of Systems   Constitutional: Negative for activity change, appetite change and fatigue. Respiratory: Negative for chest tightness and shortness of breath. Cardiovascular: Positive for palpitations. Negative for chest pain. Gastrointestinal: Positive for abdominal pain. Genitourinary: Positive for vaginal bleeding. Neurological: Negative for dizziness and light-headedness.    All other systems reviewed and are negative. Vitals:    07/03/22 1300 07/03/22 1527 07/03/22 1528   BP: (!) 178/74 (!) 159/79    Pulse: (!) 109 (!) 103 92   Resp: 18 23    Temp: 97.8 °F (36.6 °C)     SpO2: 97% 97%             Physical Exam  Vitals and nursing note reviewed. Constitutional:       Appearance: Normal appearance. HENT:      Head: Normocephalic and atraumatic. Eyes:      Extraocular Movements: Extraocular movements intact. Pupils: Pupils are equal, round, and reactive to light. Cardiovascular:      Rate and Rhythm: Regular rhythm. Tachycardia present. Pulses: Normal pulses. Heart sounds: Normal heart sounds. Pulmonary:      Effort: Pulmonary effort is normal.      Breath sounds: Normal breath sounds. Abdominal:      General: Bowel sounds are normal. There is distension. Palpations: Abdomen is soft. Tenderness: There is no abdominal tenderness. Comments: Trace ascites on exam confirmed with ultrasound. Musculoskeletal:         General: Normal range of motion. Cervical back: Normal range of motion. Skin:     General: Skin is warm and dry. Neurological:      General: No focal deficit present. Mental Status: She is alert and oriented to person, place, and time. Psychiatric:         Mood and Affect: Mood normal.         Behavior: Behavior normal.          MDM  Number of Diagnoses or Management Options  Diagnosis management comments: Vaginal bleeding, GI bleed, ovarian torsion. 67-year-old female present emergency department with sudden onset right lower quadrant abdominal pain with associated vaginal bleeding. Abdominal pain is subsequently stopped will evaluate with labs, transvaginal ultrasound patient also with increased heart rate, blood pressure at home on arrival here patient was tachycardic to 109 currently 92. Bedside US    Date/Time: 7/3/2022 7:00 PM  Performed by: Jeri Cabot, MD  Authorized by: Jeri Cabot, MD     Verbal consent obtained:  Yes Given by:  Patient  Performed by: Attending  Type of procedure: Focused abdominal aorta (Abdominal ultrasound to evaluate ascites)  Indications: Other (comment)  Proximal transverse:  Adequate  Distal transverse:  Adequate  Sagittal:  Complete  Bifurcation:  Visualized  Aneurysm: no abdominal aortic aneurysm    Sonographic Evidence for Aneurysm:  No abdominal aortic aneurysm (Moderate ascites visualized right flank, left flank suprapubic region.)          EKG shows a normal sinus rhythm with a rate of 100 inverted T waves in lead III poor R wave progression through the precordial leads of V1, V2, V3. Ultrasound shows uterine fibroid, right ovarian cyst patient's hemodynamically stable hemoglobin stable ultrasound shows moderate ascites patient supposed to have formal ultrasound performed on Wednesday of this week.

## 2022-07-05 LAB
ATRIAL RATE: 100 BPM
CALCULATED P AXIS, ECG09: 39 DEGREES
CALCULATED R AXIS, ECG10: -18 DEGREES
CALCULATED T AXIS, ECG11: 17 DEGREES
DIAGNOSIS, 93000: NORMAL
P-R INTERVAL, ECG05: 138 MS
Q-T INTERVAL, ECG07: 360 MS
QRS DURATION, ECG06: 78 MS
QTC CALCULATION (BEZET), ECG08: 464 MS
VENTRICULAR RATE, ECG03: 100 BPM

## 2022-07-06 ENCOUNTER — HOSPITAL ENCOUNTER (OUTPATIENT)
Dept: ULTRASOUND IMAGING | Age: 77
Discharge: HOME | End: 2022-07-06
Payer: MEDICARE

## 2022-07-06 DIAGNOSIS — K75.4 AUTOIMMUNE HEPATITIS (HCC): ICD-10-CM

## 2022-07-06 DIAGNOSIS — K74.60 CIRRHOSIS OF LIVER WITHOUT ASCITES, UNSPECIFIED HEPATIC CIRRHOSIS TYPE (HCC): ICD-10-CM

## 2022-07-06 PROCEDURE — 76705 ECHO EXAM OF ABDOMEN: CPT

## 2022-07-14 LAB
ALBUMIN SERPL-MCNC: 3.8 G/DL (ref 3.7–4.7)
ALP SERPL-CCNC: 219 IU/L (ref 44–121)
ALT SERPL-CCNC: 43 IU/L (ref 0–32)
AST SERPL-CCNC: 42 IU/L (ref 0–40)
BASOPHILS # BLD AUTO: 0.1 X10E3/UL (ref 0–0.2)
BASOPHILS NFR BLD AUTO: 0 %
BILIRUB DIRECT SERPL-MCNC: 0.87 MG/DL (ref 0–0.4)
BILIRUB SERPL-MCNC: 2.1 MG/DL (ref 0–1.2)
BUN SERPL-MCNC: 23 MG/DL (ref 8–27)
BUN/CREAT SERPL: 24 (ref 12–28)
CALCIUM SERPL-MCNC: 10 MG/DL (ref 8.7–10.3)
CHLORIDE SERPL-SCNC: 95 MMOL/L (ref 96–106)
CO2 SERPL-SCNC: 22 MMOL/L (ref 20–29)
CREAT SERPL-MCNC: 0.97 MG/DL (ref 0.57–1)
EGFR: 61 ML/MIN/1.73
EOSINOPHIL # BLD AUTO: 0.2 X10E3/UL (ref 0–0.4)
EOSINOPHIL NFR BLD AUTO: 1 %
ERYTHROCYTE [DISTWIDTH] IN BLOOD BY AUTOMATED COUNT: 14.3 % (ref 11.7–15.4)
GLUCOSE SERPL-MCNC: 217 MG/DL (ref 65–99)
HCT VFR BLD AUTO: 39 % (ref 34–46.6)
HGB BLD-MCNC: 13.8 G/DL (ref 11.1–15.9)
IMM GRANULOCYTES # BLD AUTO: 0 X10E3/UL (ref 0–0.1)
IMM GRANULOCYTES NFR BLD AUTO: 0 %
INR PPP: 1.1 (ref 0.9–1.2)
LYMPHOCYTES # BLD AUTO: 1.5 X10E3/UL (ref 0.7–3.1)
LYMPHOCYTES NFR BLD AUTO: 10 %
MCH RBC QN AUTO: 30.7 PG (ref 26.6–33)
MCHC RBC AUTO-ENTMCNC: 35.4 G/DL (ref 31.5–35.7)
MCV RBC AUTO: 87 FL (ref 79–97)
MONOCYTES # BLD AUTO: 1.5 X10E3/UL (ref 0.1–0.9)
MONOCYTES NFR BLD AUTO: 10 %
NEUTROPHILS # BLD AUTO: 11.4 X10E3/UL (ref 1.4–7)
NEUTROPHILS NFR BLD AUTO: 79 %
PLATELET # BLD AUTO: 144 X10E3/UL (ref 150–450)
POTASSIUM SERPL-SCNC: 4.8 MMOL/L (ref 3.5–5.2)
PROT SERPL-MCNC: 9.4 G/DL (ref 6–8.5)
PROTHROMBIN TIME: 11.8 SEC (ref 9.1–12)
RBC # BLD AUTO: 4.5 X10E6/UL (ref 3.77–5.28)
SODIUM SERPL-SCNC: 133 MMOL/L (ref 134–144)
WBC # BLD AUTO: 14.7 X10E3/UL (ref 3.4–10.8)

## 2022-07-19 ENCOUNTER — OFFICE VISIT (OUTPATIENT)
Dept: HEMATOLOGY | Age: 77
End: 2022-07-19
Payer: MEDICARE

## 2022-07-19 VITALS
DIASTOLIC BLOOD PRESSURE: 83 MMHG | SYSTOLIC BLOOD PRESSURE: 149 MMHG | HEIGHT: 60 IN | HEART RATE: 98 BPM | WEIGHT: 111 LBS | TEMPERATURE: 97.7 F | BODY MASS INDEX: 21.79 KG/M2 | RESPIRATION RATE: 16 BRPM | OXYGEN SATURATION: 98 %

## 2022-07-19 DIAGNOSIS — M32.9 LUPUS (HCC): ICD-10-CM

## 2022-07-19 PROBLEM — D25.9 UTERINE FIBROID: Status: ACTIVE | Noted: 2022-01-01

## 2022-07-19 PROCEDURE — G0463 HOSPITAL OUTPT CLINIC VISIT: HCPCS | Performed by: INTERNAL MEDICINE

## 2022-07-19 PROCEDURE — 99214 OFFICE O/P EST MOD 30 MIN: CPT | Performed by: INTERNAL MEDICINE

## 2022-07-19 PROCEDURE — G8400 PT W/DXA NO RESULTS DOC: HCPCS | Performed by: INTERNAL MEDICINE

## 2022-07-19 PROCEDURE — 1123F ACP DISCUSS/DSCN MKR DOCD: CPT | Performed by: INTERNAL MEDICINE

## 2022-07-19 PROCEDURE — G8420 CALC BMI NORM PARAMETERS: HCPCS | Performed by: INTERNAL MEDICINE

## 2022-07-19 PROCEDURE — 1090F PRES/ABSN URINE INCON ASSESS: CPT | Performed by: INTERNAL MEDICINE

## 2022-07-19 PROCEDURE — 1101F PT FALLS ASSESS-DOCD LE1/YR: CPT | Performed by: INTERNAL MEDICINE

## 2022-07-19 PROCEDURE — 3017F COLORECTAL CA SCREEN DOC REV: CPT | Performed by: INTERNAL MEDICINE

## 2022-07-19 PROCEDURE — G8427 DOCREV CUR MEDS BY ELIG CLIN: HCPCS | Performed by: INTERNAL MEDICINE

## 2022-07-19 PROCEDURE — G8536 NO DOC ELDER MAL SCRN: HCPCS | Performed by: INTERNAL MEDICINE

## 2022-07-19 PROCEDURE — G8510 SCR DEP NEG, NO PLAN REQD: HCPCS | Performed by: INTERNAL MEDICINE

## 2022-07-19 RX ORDER — BUDESONIDE 3 MG/1
CAPSULE, COATED PELLETS ORAL
COMMUNITY
Start: 2022-06-30 | End: 2022-09-22 | Stop reason: SDUPTHER

## 2022-07-19 NOTE — PROGRESS NOTES
Identified pt with two pt identifiers(name and ). Reviewed record in preparation for visit and have obtained necessary documentation. Chief Complaint   Patient presents with    Hepatitis     4 week f/u      Vitals:    22 1334   BP: (!) 149/83   Pulse: 98   Resp: 16   Temp: 97.7 °F (36.5 °C)   TempSrc: Temporal   SpO2: 98%   Weight: 111 lb (50.3 kg)   Height: 5' (1.524 m)   PainSc:   0 - No pain       Health Maintenance Review: Patient reminded of \"due or due soon\" health maintenance. I have asked the patient to contact his/her primary care provider (PCP) for follow-up on his/her health maintenance. Coordination of Care Questionnaire:  :   1) Have you been to an emergency room, urgent care, or hospitalized since your last visit? If yes, where when, and reason for visit? Yes, [facility] 7/3/22 for vaginal bleeding      2. Have seen or consulted any other health care provider since your last visit? If yes, where when, and reason for visit? US done 22 at [facility]      Patient is accompanied by daughter I have received verbal consent from Galen Das to discuss any/all medical information while they are present in the room.

## 2022-07-19 NOTE — Clinical Note
8/7/2022    Patient: Yessenia Roche   YOB: 1945   Date of Visit: 7/19/2022     Angeles Conner NP  [address] [address]  Via Fax: [phone]    Dear Angeles Conner NP,      Thank you for referring Ms. Aide Goldman to [address] for evaluation. My notes for this consultation are attached. If you have questions, please do not hesitate to call me. I look forward to following your patient along with you.       Sincerely,    Yaa Sung MD

## 2022-07-19 NOTE — PROGRESS NOTES
[address] [facility], [place], [address], [facility], [place]      JEM Morgan, [facility]     Amna AYALA Morenita, [facility]   SRAVAN Salgado-MAYRA Martinez, [facility]       Concepcion Hackett Adan [address] 136    at [address], [address] [place] [address], [facility] 22.    [phone]    FAX: [address] [place] [address]    at [address], [address]    [phone]    FAX: [phone]       Patient Care Team:  oSna Ayala NP as PCP - General (Nurse Practitioner)  Merary Han MD (Gastroenterology)  Gloria Rojas MD (Internal Medicine Physician)      Problem List  Date Reviewed: 6/26/2022            Codes Class Noted    Cirrhosis of liver without ascites [facility]) ICD-10-CM: K74.60  ICD-9-CM: 571.5  6/24/2022        Type 2 diabetes mellitus ([facility].) ICD-10-CM: E11.9  ICD-9-CM: 250.00  Unknown        GERD (gastroesophageal reflux disease) ICD-10-CM: K21.9  ICD-9-CM: 530.81  Unknown        Lupus ([facility] ICD-10-CM: M32.9  ICD-9-CM: 710.0  Unknown    Overview Signed 6/10/2022 10:51 AM by Monica Cardona MD     lupus             History of cholecystectomy ICD-10-CM: Z90.49  ICD-9-CM: V45.79  Unknown        H/O lumbosacral spine surgery ICD-10-CM: Z98.890  ICD-9-CM: V15.29  Unknown        Autoimmune hepatitis ([facility] ICD-10-CM: K75.4  ICD-9-CM: 571.42  2021                [facility] for management of 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 The active problem list, all pertinent past medical history, medications, liver histology, endoscopic studies, radiologic findings and laboratory findings related to the liver disorder were reviewed and discussed with the patient. The patient is a 77 y.o.   female who was found to have abnormalities in liver transaminases and an elevation in TBILI to 3.2 mg in 5/2021. Serologic evaluation for markers of chronic liver disease was positive for ASMA,     The most recent imaging of the liver was Ultrasound and MRI performed in 5 and 6/2021. Results suggest chronic liver disease. Liver biopsy in 11/2021 demonstrated severe portal inflammation with interface inflammation, PMN and stage and bridging necrosis, and possibly lobular collapse. Given the intense inflammation the degree of fibrosis was difficult to assess. Best guess is stage 3 fibrosis. Fibroscan performed in 6/2022 was 55.6 kPa which correlates with cirrhosis. The patient is being treated with budesonide 9 mg and cellcept 500 mg BID. The patient has the following symptoms which could be attributed to the liver disorder:    fatigue,     The patient is not experiencing the following symptoms which are commonly seen in this liver disorder:   pain in the right side over the liver,     The patient has Moderate limitations in functional activities which can be attributed to the liver disease       ASSESSMENT AND PLAN:  Cirrhosis  The diagnosis of cirrhosis is based upon liver biopsy, Fibroscan, imaging, laboratory studies, complications of cirrhosis. 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 The patient has normal liver function. The CTP is 5. Child class A. The MELD score is 10. Autoimmune Hepatitis   The diagnosis was based upon serology, liver biopsy, other coexistant autoimmune disorders    A liver biopsy performed in 11/2021 shows severe inflammation, interface hepatitis, piecemeal necrosis, bridign necrosis, possibly lobular collapse, and stage 3 fibrosis    Fibroscan performed in 6/2022 demonstrated a liver stiffness of 55.6 consistent with stage 4 fibrosis.     Severe inflammation present in the liver biopsy can increase liver stiffness and result in an overestimate in the degree of fibrosis     The patient was started on budesonide 9 mg every day   She developed swelling of the lower extremities and stopped treatment. Budesonide 9 mg every day was restarted along with cellcept 500 mg every day. The liver enzymes are down significantly from pretreatment values in the 300s but have not fully normalized. Will continue current immune suppression for 6 months and then taper budesonide and leave her on cellcept long term even if the liver enzymes are not fully normal.  At her age we need to balance immune suppression and infection risk with simply slowing down disease progression. Ascites   Ascites developed for the first time in 7/2022. This was minimal and seen on US only. Ascites resolved with step 1 dose of diuretics. She stopped lasix and remains on aldactone 100 mg every day. Will continue the current dose of diuretics at step 0/100. The patient was counseled regarding the need to maintain sodium restriction and the types of foods containing high amounts of sodium to be avoided. Lower extremity edema  Edema has resolved with step 1 diuretics. Will continue the current dose of diuretics at step 0/100. Screening for Hepatocellular Carcinoma  [address] screening has not been not been performed since 6/2021. AFP was ordered today and ultrasound will be scheduled. Treatment of other medical problems in patients with chronic liver disease  There are no contraindications for the patient to take most medications that are necessary for treatment of other medical issues. The patient has cirrhrosis and should avoid taking NSAIDs which are associated with a higher rate of developing SAMMY.         The patient can take any medications utilized for treatment of DM, statins to treat hypercholesterolemia    Normal doses of acetaminophen, as recommended on the label of the bottle, are not hepatotoxic except in the setting of daily alcohol use, even in patients with cirrhosis and can be utilized for pain. Counseling for alcohol in patients with chronic liver disease  The patient was counseled regarding alcohol consumption and the effect of alcohol on chronic liver disease. The patient does not consume any significant amount of alcohol. Vaccinations   The need for vaccination against viral hepatitis A and B will be assessed with serologic and instituted as appropriate. The patient has received 2 doses of COVID-19 vaccine. Routine vaccinations against other bacterial and viral agents can be performed as indicated. Annual flu vaccination should be administered if indicated. ALLERGIES  Allergies   Allergen Reactions    Budesonide Other (comments)     Patient states she is not allergic    Pcn [Penicillins] Rash       MEDICATIONS  Current Outpatient Medications   Medication Sig    budesonide (ENTOCORT EC) 3 mg capsule TAKE 3 CAPS BY MOUTH DAILY FOR 8 WEEKS    mycophenolate (CellCept) 500 mg tablet Take 1 Tablet by mouth two (2) times a day. furosemide (Lasix) 40 mg tablet Take 1 Tablet by mouth daily. spironolactone (Aldactone) 100 mg tablet Take 1 Tablet by mouth daily. glimepiride (AMARYL) 1 mg tablet Take 1 mg by mouth daily. pantoprazole (Protonix) 40 mg tablet Take 1 Tablet by mouth daily. hydrOXYchloroQUINE (PlaqueniL) 200 mg tablet Take 200 mg by mouth daily. omeprazole (PRILOSEC) 40 mg capsule Take 1 Capsule by mouth daily. (Patient not taking: Reported on 7/19/2022)     No current facility-administered medications for this visit. SYSTEM REVIEW NOT RELATED TO LIVER DISEASE OR REVIEWED ABOVE:  Constitution systems: Negative for fever, chills, weight gain, weight loss. Eyes: Negative for visual changes. ENT: Negative for sore throat, painful swallowing. Respiratory: Negative for cough, hemoptysis, SOB. Cardiology: Negative for chest pain, palpitations. GI:  Negative for constipation or diarrhea.   : Negative for urinary frequency, dysuria, hematuria, nocturia. Skin: Negative for rash. Hematology: Negative for easy bruising, blood clots. Musculo-skelatal: Negative for back pain, muscle pain, weakness. Neurologic: Negative for headaches, dizziness, vertigo, memory problems not related to HE. Psychology: Negative for anxiety, depression. FAMILY HISTORY:  The father  of MI. The mother  at age 80 years. There is no family history of liver disease. SOCIAL HISTORY:  The patient is . The patient has 1 child and 1 grandchild  The patient has never used tobacco products. The patient has previously consumed 1 alcoholic beverages per day   The patient has been abstinent from alcohol since  when she first developed AIH. The patient used to work as . PHYSICAL EXAMINATION:  Visit Vitals  BP (!) 149/83 (BP 1 Location: Left upper arm, BP Patient Position: Sitting, BP Cuff Size: Adult)   Pulse 98   Temp 97.7 °F (36.5 °C) (Temporal)   Resp 16   Ht 5' (1.524 m)   Wt 111 lb (50.3 kg)   SpO2 98%   BMI 21.68 kg/m²     General: No acute distress. Eyes: Sclera anicteric. ENT: No oral lesions. Thyroid normal.  Nodes: No adenopathy. Skin: No spider angiomata. No jaundice. No palmar erythema. Respiratory: Lungs clear to auscultation. Cardiovascular: Regular heart rate. No murmurs. No JVD. Abdomen: Soft non-tender. Liver size normal to percussion/palpation. Spleen not palpable. No obvious ascites. Extremities: No edema. No muscle wasting. No gross arthritic changes. Neurologic: Alert and oriented. Cranial nerves grossly intact. No asterixis.     LABORATORY STUDIES:  Liver [place] of [address] [place] [address] & Units 2022 6/10/2022   WBC 3.6 - 11.0 K/uL  6.2   ANC 1.8 - 8.0 K/UL  4.6   HGB 11.5 - 16.0 g/dL  11.4 (L)    - 400 K/uL  141 (L)   INR 0.9 - 1.1    1.2 (H)   AST 15 - 37 U/L 58 (H) 52 (H)   ALT 12 - 78 U/L 38 35   Alk Phos 45 - 117 U/L 168 (H) 174 (H)   Bili, Total 0.2 - 1.0 MG/DL 1.8 (H) 1.7 (H)   Bili, Direct 0.0 - 0.2 MG/DL  0.9 (H)   Albumin 3.5 - 5.0 g/dL 2.9 (L) 2.7 (L)   BUN 6 - 20 MG/DL 12 15   Creat 0.55 - 1.02 MG/DL 1.06 (H) 1.07 (H)   Na 136 - 145 mmol/L 143 140   K 3.5 - 5.1 mmol/L 4.0 3.9   Cl 97 - 108 mmol/L 108 108   CO2 21 - 32 mmol/L 29 27   Glucose 65 - 100 mg/dL 119 (H) 215 (H)     SEROLOGIES:  Serologies Latest Ref Rng & Units 6/10/2022   NIMO, IFA  Negative   ASMCA 0 - 19 Units 57 (H)   Anti-SLA 0.0 - 20.0 units 1.1   LKM 0.0 - 20.0 Units 1.0       LIVER HISTOLOGY:  11/2021. Slides reviewed by MLS. Severe hepatitis. severe portal inflammation, with severe interface hepatitis, with moderate PMN. Bile ducts are normal.  moderate lobular inflammation. no steatosis. The degree of fibrosis is difficult to estimate given the severe diffuse inflammation. Best estimate of fbrosis is stage 2-3. Knodell score (1606). 6/2022. FibroScan performed at [address]. EkPa was 55.6. IQR/med 64%. . The results suggested a fibrosis level of F4. The high IQR% suggests that the measurement may not be reliable. The CAP score suggests there is no significant hepatic steatosis. ENDOSCOPIC PROCEDURES:  Not available or performed    RADIOLOGY:  5/2021. Ultrasound of liver. Echogenic consistent with chronic liver disease. No liver mass lesions. No dilated bile ducts. No ascites. 6/2021. Dynamic MRI of liver. Normal appearing liver. No liver mass lesions. Normal spleen. Mild dilated bile ducts normal for post-cholecystectomy. No bile duct strictures. No CBD stones. No ascites. OTHER TESTING:  Not available or performed    FOLLOW-UP:  All of the issues listed above in the Assessment and Plan were discussed with the patient. All questions were answered. The patient expressed a clear understanding of the above. [address] [address] in 3 months for monitoring.         Eric Winn MD [place] [address], 2000 [facility] 22.  201 [facility]

## 2022-09-03 ENCOUNTER — HOSPITAL ENCOUNTER (EMERGENCY)
Age: 77
Discharge: HOME | End: 2022-09-03
Attending: STUDENT IN AN ORGANIZED HEALTH CARE EDUCATION/TRAINING PROGRAM
Payer: MEDICARE

## 2022-09-03 VITALS
WEIGHT: 109 LBS | RESPIRATION RATE: 16 BRPM | HEIGHT: 60 IN | BODY MASS INDEX: 21.4 KG/M2 | SYSTOLIC BLOOD PRESSURE: 140 MMHG | DIASTOLIC BLOOD PRESSURE: 81 MMHG | OXYGEN SATURATION: 98 % | TEMPERATURE: 97.6 F | HEART RATE: 83 BPM

## 2022-09-03 DIAGNOSIS — N39.0 URINARY TRACT INFECTION WITH HEMATURIA, SITE UNSPECIFIED: ICD-10-CM

## 2022-09-03 DIAGNOSIS — E87.1 HYPONATREMIA: ICD-10-CM

## 2022-09-03 DIAGNOSIS — R53.1 WEAKNESS: Primary | ICD-10-CM

## 2022-09-03 DIAGNOSIS — R31.9 URINARY TRACT INFECTION WITH HEMATURIA, SITE UNSPECIFIED: ICD-10-CM

## 2022-09-03 LAB
ALBUMIN SERPL-MCNC: 3.1 G/DL (ref 3.5–5)
ALBUMIN/GLOB SERPL: 0.6 {RATIO} (ref 1.1–2.2)
ALP SERPL-CCNC: 170 U/L (ref 45–117)
ALT SERPL-CCNC: 78 U/L (ref 12–78)
ANION GAP SERPL CALC-SCNC: 9 MMOL/L (ref 5–15)
APPEARANCE UR: ABNORMAL
AST SERPL W P-5'-P-CCNC: 56 U/L (ref 15–37)
BACTERIA URNS QL MICRO: NEGATIVE /HPF
BASOPHILS # BLD: 0 K/UL (ref 0–0.1)
BASOPHILS NFR BLD: 0 % (ref 0–1)
BILIRUB SERPL-MCNC: 2.5 MG/DL (ref 0.2–1)
BILIRUB UR QL: NEGATIVE
BUN SERPL-MCNC: 34 MG/DL (ref 6–20)
BUN/CREAT SERPL: 20 (ref 12–20)
CA-I BLD-MCNC: 10 MG/DL (ref 8.5–10.1)
CHLORIDE SERPL-SCNC: 94 MMOL/L (ref 97–108)
CO2 SERPL-SCNC: 23 MMOL/L (ref 21–32)
COLOR UR: ABNORMAL
CREAT SERPL-MCNC: 1.7 MG/DL (ref 0.55–1.02)
DIFFERENTIAL METHOD BLD: ABNORMAL
EOSINOPHIL # BLD: 0 K/UL (ref 0–0.4)
EOSINOPHIL NFR BLD: 0 % (ref 0–7)
ERYTHROCYTE [DISTWIDTH] IN BLOOD BY AUTOMATED COUNT: 14.5 % (ref 11.5–14.5)
GLOBULIN SER CALC-MCNC: 5.5 G/DL (ref 2–4)
GLUCOSE SERPL-MCNC: 390 MG/DL (ref 65–100)
GLUCOSE UR STRIP.AUTO-MCNC: >1000 MG/DL
HCT VFR BLD AUTO: 41.3 % (ref 35–47)
HGB BLD-MCNC: 14.7 G/DL (ref 11.5–16)
HGB UR QL STRIP: ABNORMAL
HYALINE CASTS URNS QL MICRO: ABNORMAL /LPF (ref 0–5)
IMM GRANULOCYTES # BLD AUTO: 0.1 K/UL (ref 0–0.04)
IMM GRANULOCYTES NFR BLD AUTO: 1 % (ref 0–0.5)
KETONES UR QL STRIP.AUTO: NEGATIVE MG/DL
LEUKOCYTE ESTERASE UR QL STRIP.AUTO: ABNORMAL
LYMPHOCYTES # BLD: 0.7 K/UL (ref 0.8–3.5)
LYMPHOCYTES NFR BLD: 5 % (ref 12–49)
MCH RBC QN AUTO: 32.1 PG (ref 26–34)
MCHC RBC AUTO-ENTMCNC: 35.6 G/DL (ref 30–36.5)
MCV RBC AUTO: 90.2 FL (ref 80–99)
MONOCYTES # BLD: 1 K/UL (ref 0–1)
MONOCYTES NFR BLD: 8 % (ref 5–13)
MUCOUS THREADS URNS QL MICRO: ABNORMAL /LPF
NEUTS SEG # BLD: 11.6 K/UL (ref 1.8–8)
NEUTS SEG NFR BLD: 86 % (ref 32–75)
NITRITE UR QL STRIP.AUTO: NEGATIVE
NRBC # BLD: 0 K/UL (ref 0–0.01)
NRBC BLD-RTO: 0 PER 100 WBC
PH UR STRIP: 5 [PH]
PLATELET # BLD AUTO: 192 K/UL (ref 150–400)
PMV BLD AUTO: 11.4 FL (ref 8.9–12.9)
POTASSIUM SERPL-SCNC: 5.1 MMOL/L (ref 3.5–5.1)
PROT SERPL-MCNC: 8.6 G/DL (ref 6.4–8.2)
PROT UR STRIP-MCNC: NEGATIVE MG/DL
RBC # BLD AUTO: 4.58 M/UL (ref 3.8–5.2)
RBC #/AREA URNS HPF: ABNORMAL /HPF (ref 0–5)
SODIUM SERPL-SCNC: 126 MMOL/L (ref 136–145)
SP GR UR REFRACTOMETRY: 1.02 (ref 1–1.03)
TROPONIN-HIGH SENSITIVITY: 19 NG/L (ref 0–51)
UROBILINOGEN UR QL STRIP.AUTO: 0.1 EU/DL (ref 0.2–1)
WBC # BLD AUTO: 13.5 K/UL (ref 3.6–11)
WBC URNS QL MICRO: ABNORMAL /HPF (ref 0–4)

## 2022-09-03 PROCEDURE — 84484 ASSAY OF TROPONIN QUANT: CPT

## 2022-09-03 PROCEDURE — 93005 ELECTROCARDIOGRAM TRACING: CPT

## 2022-09-03 PROCEDURE — 36415 COLL VENOUS BLD VENIPUNCTURE: CPT

## 2022-09-03 PROCEDURE — 99284 EMERGENCY DEPT VISIT MOD MDM: CPT

## 2022-09-03 PROCEDURE — 81001 URINALYSIS AUTO W/SCOPE: CPT

## 2022-09-03 PROCEDURE — 85025 COMPLETE CBC W/AUTO DIFF WBC: CPT

## 2022-09-03 PROCEDURE — 80053 COMPREHEN METABOLIC PANEL: CPT

## 2022-09-03 NOTE — ED PROVIDER NOTES
EMERGENCY DEPARTMENT HISTORY AND PHYSICAL EXAM      Date: 9/3/2022  Patient Name: Jaky Current    History of Presenting Illness     Chief Complaint   Patient presents with    Fatigue       History Provided By: Patient and Patient's Daughter    HPI: Jaky Current, 77 y.o. female with a past medical history significant  autoimmune hepatitis, cirrhosis  presents to the ED with cc of sudden onset weakness, near syncope. Patient was sitting outside watching football game this morning, states she started feeling weak, daughter noted that patient was having trouble ambulating due to weakness, no syncopal episodes, brought patient to emergency department for evaluation. Patient states since coming to hospital condition has improved, currently patient at baseline. Patient denies any chest pain, shortness of breath, no abdominal pain nausea or vomiting, no abdominal distention. There are no other complaints, changes, or physical findings at this time. PCP: Caitlni Horne NP    Current Outpatient Medications   Medication Sig Dispense Refill    budesonide (ENTOCORT EC) 3 mg capsule TAKE 3 CAPS BY MOUTH DAILY FOR 8 WEEKS      mycophenolate (CellCept) 500 mg tablet Take 1 Tablet by mouth two (2) times a day. 60 Tablet 3    furosemide (Lasix) 40 mg tablet Take 1 Tablet by mouth daily. 90 Tablet 3    spironolactone (Aldactone) 100 mg tablet Take 1 Tablet by mouth daily. 90 Tablet 3    glimepiride (AMARYL) 1 mg tablet Take 1 mg by mouth daily. pantoprazole (Protonix) 40 mg tablet Take 1 Tablet by mouth daily. 30 Tablet 0    hydrOXYchloroQUINE (PlaqueniL) 200 mg tablet Take 200 mg by mouth daily.          Past History     Past Medical History:  Past Medical History:   Diagnosis Date    Autoimmune hepatitis ([facility] 2021    GERD (gastroesophageal reflux disease)     Lupus ([facility]     lupus    Uterine fibroid 07/03/2022       Past Surgical History:  Past Surgical History:   Procedure Laterality Date BIOPSY LIVER  10/2021    [facility]    HX CARPAL TUNNEL RELEASE      HX CHOLECYSTECTOMY      HX ORTHOPAEDIC      back     HX OTHER SURGICAL      gallbladder removal    IR BX LIVER PERCUTANEOUS  11/9/2021    MN BREAST SURGERY PROCEDURE UNLISTED Left     lumpectomy       Family History:  Family History   Problem Relation Age of Onset    Cancer Mother         breast    Lupus Mother     No Known Problems Father        Social History:  Social History     Tobacco Use    Smoking status: Never    Smokeless tobacco: Never   Vaping Use    Vaping Use: Never used   Substance Use Topics    Alcohol use: Not Currently     Comment: a glass of wine every 6 months    Drug use: Never       Allergies: Allergies   Allergen Reactions    Budesonide Other (comments)     Patient states she is not allergic    Pcn [Penicillins] Rash         Review of Systems     Review of Systems   Constitutional:  Positive for fatigue. Negative for activity change, chills and fever. HENT:  Negative for congestion and trouble swallowing. Eyes:  Negative for photophobia and visual disturbance. Respiratory:  Negative for cough, chest tightness and shortness of breath. Cardiovascular:  Negative for chest pain and palpitations. Gastrointestinal:  Negative for abdominal distention, abdominal pain, diarrhea, nausea and vomiting. Genitourinary:  Negative for dysuria, flank pain and frequency. Musculoskeletal:  Negative for arthralgias, back pain and myalgias. Skin:  Negative for color change, pallor and rash. Neurological:  Positive for weakness. Negative for dizziness, tremors and headaches. Psychiatric/Behavioral:  Negative for confusion. Physical Exam     Physical Exam  Vitals and nursing note reviewed. Constitutional:       General: She is not in acute distress. Appearance: Normal appearance. She is normal weight. She is not ill-appearing. HENT:      Head: Normocephalic and atraumatic.       Nose: Nose normal. Mouth/Throat:      Mouth: Mucous membranes are moist.   Eyes:      Extraocular Movements: Extraocular movements intact. Pupils: Pupils are equal, round, and reactive to light. Cardiovascular:      Rate and Rhythm: Normal rate and regular rhythm. Pulses: Normal pulses. Pulmonary:      Effort: Pulmonary effort is normal.   Abdominal:      General: Abdomen is flat. Bowel sounds are normal. There is no distension. Palpations: Abdomen is soft. Tenderness: There is no abdominal tenderness. There is no guarding. Musculoskeletal:         General: No tenderness. Normal range of motion. Cervical back: Normal range of motion and neck supple. No muscular tenderness. Skin:     General: Skin is warm and dry. Neurological:      General: No focal deficit present. Mental Status: She is alert and oriented to person, place, and time. Sensory: No sensory deficit. Motor: No weakness. Diagnostic Study Results     Labs -     No results found for this or any previous visit (from the past 12 hour(s)). Radiologic Studies -   [unfilled]  CT Results  (Last 48 hours)      None          CXR Results  (Last 48 hours)      None            Medical Decision Making and ED Course   I am the first provider for this patient. I reviewed the vital signs, available nursing notes, past medical history, past surgical history, family history and social history. Vital Signs-Reviewed the patient's vital signs. No data found.       EKG interpretation: (Preliminary)  Sinus tachycardia 109, no ST elevation, left axis deviation    Records Reviewed: Nursing Notes    The patient presents with generalized weakness dizziness with a differential diagnosis of dehydration, vasovagal near syncope, electrolyte abnormality, sepsis, cardiac arrhythmia      Provider Notes (Medical Decision Making):     MDM     52-year-old female history of cirrhosis, autoimmune hepatitis, presents emergency department for episode of generalized weakness dizziness while sitting outside watching a football game today. Physical exam shows well-appearing female, no distress, stable vitals, patient currently awake alert oriented states that symptoms have resolved. Abdomen soft minimal distention no fluid wave, no lower extremity swelling. EKG shows mild tachycardia however no signs of acute ischemic changes, will draw basic lab work including cardiac enzymes, continue to observe patient on cardiac monitor. ED Course:   Initial assessment performed. The patients presenting problems have been discussed, and they are in agreement with the care plan formulated and outlined with them. I have encouraged them to ask questions as they arise throughout their visit. ED Course as of 09/04/22 1207   Sat Sep 03, 2022   2106 Patient's lab work reviewed, metabolic panel significant for hyponatremia 127, renal insufficiency BUN 34 creatinine 1.7. Mild worsening from 1 month ago, sodium was 133. Patient CBC shows leukocytosis 13.5, stable H&H, urinalysis shows small blood trace leuk esterase suggestive of mild UTI. [PZ]   1748 Discussed results with patient and daughter, patient states she feels well, weakness has resolved. Attribute weakness to mild dehydration, mild hyponatremia and UTI. Given suspicion for euvolemic hyponatrema patient may need fluid restriction. At this time we will not alter any patient's diuretics, patient has appointment with her hepatologist in 2 weeks, instructed to call next week to see if she can be seen anytime sooner. Will discharge patient home, to follow-up as soon as possible with hepatologist. [PZ]      ED Course User Index  [PZ] Dario Villalba MD         Procedures       Tiny Dobbs MD  Procedures             Disposition       Discharged    Remove if not discharged  DISCHARGE PLAN:  1.    Current Discharge Medication List        CONTINUE these medications which have NOT CHANGED    Details budesonide (ENTOCORT EC) 3 mg capsule TAKE 3 CAPS BY MOUTH DAILY FOR 8 WEEKS      mycophenolate (CellCept) 500 mg tablet Take 1 Tablet by mouth two (2) times a day. Qty: 60 Tablet, Refills: 3      furosemide (Lasix) 40 mg tablet Take 1 Tablet by mouth daily. Qty: 90 Tablet, Refills: 3      spironolactone (Aldactone) 100 mg tablet Take 1 Tablet by mouth daily. Qty: 90 Tablet, Refills: 3      glimepiride (AMARYL) 1 mg tablet Take 1 mg by mouth daily. pantoprazole (Protonix) 40 mg tablet Take 1 Tablet by mouth daily. Qty: 30 Tablet, Refills: 0      hydrOXYchloroQUINE (PlaqueniL) 200 mg tablet Take 200 mg by mouth daily. 2.   Follow-up Information       Follow up With Specialties Details Why Contact Sina Haskins NP Nurse Practitioner In 1 week  [address] [facility]  [phone]      Your hepatologist  In 1 week            3. Return to ED if worse     Diagnosis     Clinical Impression:   1. Weakness    2. Urinary tract infection with hematuria, site unspecified    3. Hyponatremia        Attestations:    Adarsh Kan MD    Please note that this dictation was completed with [employer], the computer voice recognition software. Quite often unanticipated grammatical, syntax, homophones, and other interpretive errors are inadvertently transcribed by the computer software. Please disregard these errors. Please excuse any errors that have escaped final proofreading. Thank you.

## 2022-09-03 NOTE — ED TRIAGE NOTES
[facility] 15 pt's daughter stated that while at a football game pt became very weak and flushed while attempting to get out of the stands; c/o feeling weak

## 2022-09-06 LAB
ATRIAL RATE: 109 BPM
CALCULATED P AXIS, ECG09: 31 DEGREES
CALCULATED R AXIS, ECG10: -31 DEGREES
CALCULATED T AXIS, ECG11: 13 DEGREES
DIAGNOSIS, 93000: NORMAL
P-R INTERVAL, ECG05: 122 MS
Q-T INTERVAL, ECG07: 318 MS
QRS DURATION, ECG06: 78 MS
QTC CALCULATION (BEZET), ECG08: 428 MS
VENTRICULAR RATE, ECG03: 109 BPM

## 2022-09-07 ENCOUNTER — TELEPHONE (OUTPATIENT)
Dept: HEMATOLOGY | Age: 77
End: 2022-09-07

## 2022-09-07 ENCOUNTER — OFFICE VISIT (OUTPATIENT)
Dept: OBGYN CLINIC | Age: 77
End: 2022-09-07
Payer: MEDICARE

## 2022-09-07 VITALS — BODY MASS INDEX: 21.4 KG/M2 | HEIGHT: 60 IN | WEIGHT: 109 LBS

## 2022-09-07 DIAGNOSIS — K74.60 CIRRHOSIS OF LIVER WITHOUT ASCITES, UNSPECIFIED HEPATIC CIRRHOSIS TYPE (HCC): ICD-10-CM

## 2022-09-07 DIAGNOSIS — R19.03 RIGHT LOWER QUADRANT ABDOMINAL SWELLING, MASS AND LUMP: ICD-10-CM

## 2022-09-07 DIAGNOSIS — R63.4 RECENT UNEXPLAINED WEIGHT LOSS: ICD-10-CM

## 2022-09-07 DIAGNOSIS — N95.0 PMB (POSTMENOPAUSAL BLEEDING): Primary | ICD-10-CM

## 2022-09-07 DIAGNOSIS — N83.201 CYST OF RIGHT OVARY: ICD-10-CM

## 2022-09-07 PROBLEM — N93.9 ABNORMAL UTERINE BLEEDING (AUB): Status: ACTIVE | Noted: 2022-01-01

## 2022-09-07 PROCEDURE — 99214 OFFICE O/P EST MOD 30 MIN: CPT | Performed by: OBSTETRICS & GYNECOLOGY

## 2022-09-07 PROCEDURE — 1090F PRES/ABSN URINE INCON ASSESS: CPT | Performed by: OBSTETRICS & GYNECOLOGY

## 2022-09-07 PROCEDURE — G8420 CALC BMI NORM PARAMETERS: HCPCS | Performed by: OBSTETRICS & GYNECOLOGY

## 2022-09-07 PROCEDURE — G8400 PT W/DXA NO RESULTS DOC: HCPCS | Performed by: OBSTETRICS & GYNECOLOGY

## 2022-09-07 PROCEDURE — 1101F PT FALLS ASSESS-DOCD LE1/YR: CPT | Performed by: OBSTETRICS & GYNECOLOGY

## 2022-09-07 PROCEDURE — G8510 SCR DEP NEG, NO PLAN REQD: HCPCS | Performed by: OBSTETRICS & GYNECOLOGY

## 2022-09-07 PROCEDURE — 1123F ACP DISCUSS/DSCN MKR DOCD: CPT | Performed by: OBSTETRICS & GYNECOLOGY

## 2022-09-07 PROCEDURE — G8427 DOCREV CUR MEDS BY ELIG CLIN: HCPCS | Performed by: OBSTETRICS & GYNECOLOGY

## 2022-09-07 PROCEDURE — G8536 NO DOC ELDER MAL SCRN: HCPCS | Performed by: OBSTETRICS & GYNECOLOGY

## 2022-09-07 NOTE — PROGRESS NOTES
Chief Complaint   Patient presents with    New Patient     pmb     Visit Vitals  Ht 5' (1.524 m)   Wt 109 lb (49.4 kg)   BMI 21.29 kg/m²

## 2022-09-07 NOTE — PROGRESS NOTES
Jenny Carbajal is a No obstetric history on file. , 69 y.o. female   No LMP recorded. Patient is postmenopausal.    She presents for her problem    She is having  had an episode of vaginal bleeding- seen in ER, none since- US showed a 9 cm ovarian mass, has Cirrhosis - -per pt has lost #40  . Menstrual status:  Cycles are menopausal.    Flow: absent. She does not have dysmenorrhea. Medical conditions:  Since her last annual GYN exam about one year ago, she has not the following changes in her health history: none. Mammogram History:    KAYLI Results (most recent):  No results found for this or any previous visit. DEXA Results (most recent):  No results found for this or any previous visit. Past Medical History:   Diagnosis Date    Autoimmune hepatitis ([address] 2021    GERD (gastroesophageal reflux disease)     Lupus (HCC)     lupus    PMB (postmenopausal bleeding) 08/2022    Uterine fibroid 07/03/2022     Past Surgical History:   Procedure Laterality Date    BIOPSY LIVER  10/2021    [facility]    HX CARPAL TUNNEL RELEASE      HX CHOLECYSTECTOMY      HX ORTHOPAEDIC      back     HX OTHER SURGICAL      gallbladder removal    IR BX LIVER PERCUTANEOUS  11/9/2021    CO BREAST SURGERY PROCEDURE UNLISTED Left     lumpectomy       Prior to Admission medications    Medication Sig Start Date End Date Taking? Authorizing Provider   budesonide (ENTOCORT EC) 3 mg capsule TAKE 3 CAPS BY MOUTH DAILY FOR 8 WEEKS 6/30/22  Yes Provider, Historical   mycophenolate (CellCept) 500 mg tablet Take 1 Tablet by mouth two (2) times a day. 6/24/22  Yes Tammy Ngo MD   furosemide (Lasix) 40 mg tablet Take 1 Tablet by mouth daily. 6/24/22  Yes Tammy Ngo MD   spironolactone (Aldactone) 100 mg tablet Take 1 Tablet by mouth daily. 6/24/22  Yes Tammy Ngo MD   glimepiride (AMARYL) 1 mg tablet Take 1 mg by mouth daily.  5/17/22  Yes Provider, Historical   pantoprazole (Protonix) 40 mg tablet Take 1 Tablet by mouth daily. 12/10/21  Yes Ashley Amezcua MD   hydrOXYchloroQUINE (PLAQUENIL) 200 mg tablet Take 200 mg by mouth daily. Yes Provider, Historical       Allergies   Allergen Reactions    Budesonide Other (comments)     Patient states she is not allergic    Pcn [Penicillins] Rash          Tobacco History:  reports that she has never smoked. She has never used smokeless tobacco.  Alcohol use:  reports that she does not currently use alcohol. Drug use:  reports no history of drug use. Family Medical/Cancer History:   Family History   Problem Relation Age of Onset    Cancer Mother         breast    Lupus Mother     No Known Problems Father           Review of Systems   Constitutional:  Positive for weight loss. Negative for chills, fever and malaise/fatigue. HENT:  Negative for congestion, ear pain, sinus pain and tinnitus. Eyes:  Negative for blurred vision and double vision. Respiratory:  Negative for cough, shortness of breath and wheezing. Cardiovascular:  Negative for chest pain and palpitations. Gastrointestinal:  Negative for abdominal pain, blood in stool, constipation, diarrhea, heartburn, nausea and vomiting. Genitourinary:  Negative for dysuria, flank pain, frequency, hematuria and urgency. Musculoskeletal:  Negative for joint pain and myalgias. Skin:  Negative for itching and rash. Neurological:  Negative for dizziness, weakness and headaches. Psychiatric/Behavioral:  Negative for depression, memory loss and suicidal ideas. The patient is not nervous/anxious and does not have insomnia. Physical Exam  Constitutional:       Appearance: Normal appearance. HENT:      Head: Normocephalic and atraumatic. Cardiovascular:      Rate and Rhythm: Normal rate. Heart sounds: Normal heart sounds. Pulmonary:      Effort: Pulmonary effort is normal.      Breath sounds: Normal breath sounds.    Abdominal:      General: Abdomen is flat and protuberant. A surgical scar is present. There is distension. Palpations: Abdomen is soft. Genitourinary:     General: Normal vulva. Vagina: Normal.      Cervix: Normal.      Uterus: Normal.       Adnexa: Left adnexa normal.        Right: Mass present. Rectum: Normal.      Comments: No PAP obtained    Atrophic vagina    Small caliber vagina with stenotic cervix- EMBX not done( see US report normal endometrial lining thickness)  Neurological:      Mental Status: She is alert. Psychiatric:         Mood and Affect: Mood normal.         Behavior: Behavior normal.         Thought Content: Thought content normal.        Visit Vitals  Ht 5' (1.524 m)   Wt 109 lb (49.4 kg)   BMI 21.29 kg/m²         Assessment: Diagnoses and all orders for this visit:    1. PMB (postmenopausal bleeding)    2. Cyst of right ovary  -     CANCER ANTIGEN 125    3. Right lower quadrant abdominal swelling, mass and lump   -     CANCER ANTIGEN 125    4. Cirrhosis of liver without ascites, unspecified hepatic cirrhosis type ([facility]    5.  Recent unexplained weight loss      Plan: Questions addressed  Counseled re: diet, exercise, healthy lifestyle  Return for Annual  Rec annual mammogram

## 2022-09-07 NOTE — TELEPHONE ENCOUNTER
[email or web address] Attempt to return daughter Michelle Beckman) call no answer left voice message. Patient was in the ER on 9/3/22 for weakness, UTI and low sodium level. (KF)  1015--spoke w/daughter. Patient will be going to [facility] today to repeat labs and PCP on tomorrow 9/8/22. Will follow labs to monitor creat/sodium levels. At this time there are no sooner appt for MLS. Daughter verbalize understanding. (KF)

## 2022-09-07 NOTE — TELEPHONE ENCOUNTER
Daughter called and states Patient is experiencing extreme weakness can't move difficulty walking and hair loss would like to talk to a nurse about her care

## 2022-09-08 LAB
ALBUMIN SERPL-MCNC: 3.7 G/DL (ref 3.7–4.7)
ALP SERPL-CCNC: 182 IU/L (ref 44–121)
ALT SERPL-CCNC: 62 IU/L (ref 0–32)
AST SERPL-CCNC: 33 IU/L (ref 0–40)
BILIRUB DIRECT SERPL-MCNC: 0.95 MG/DL (ref 0–0.4)
BILIRUB SERPL-MCNC: 1.7 MG/DL (ref 0–1.2)
BUN SERPL-MCNC: 27 MG/DL (ref 8–27)
BUN/CREAT SERPL: 26 (ref 12–28)
CALCIUM SERPL-MCNC: 10 MG/DL (ref 8.7–10.3)
CANCER AG125 SERPL-ACNC: 154 U/ML (ref 0–38.1)
CHLORIDE SERPL-SCNC: 90 MMOL/L (ref 96–106)
CO2 SERPL-SCNC: 20 MMOL/L (ref 20–29)
CREAT SERPL-MCNC: 1.05 MG/DL (ref 0.57–1)
EGFR: 55 ML/MIN/1.73
GLUCOSE SERPL-MCNC: 170 MG/DL (ref 65–99)
INR PPP: 1 (ref 0.9–1.2)
POTASSIUM SERPL-SCNC: 5 MMOL/L (ref 3.5–5.2)
PROT SERPL-MCNC: 8.1 G/DL (ref 6–8.5)
PROTHROMBIN TIME: 10.5 SEC (ref 9.1–12)
SODIUM SERPL-SCNC: 128 MMOL/L (ref 134–144)

## 2022-09-08 NOTE — PROGRESS NOTES
Next appt 9/22/22 labs results will be reviewed at that time.  Sodium up by 2 points from 9/3/22 when in the ER

## 2022-09-08 NOTE — TELEPHONE ENCOUNTER
[email or web address]@[email or web address] Discuss recent labs w/. Sodium up by 2 point 128--have patient decrease dose in half. Lasix now 20 mg and spironalactone 50 mg until next office visit on 9/22/22. Also called daughter in left voice message. (KF)

## 2022-09-13 NOTE — PROGRESS NOTES
results DWP  Can be elevated in pt's with Cirrhosis  Pt has upcoming appt.  With her Liver MD  Will review the images of US to see if they cyst appears more simple or has any worrisome signs- then proceed from there, pt agrees  May need surgery at the least- doubt pt could tolerate any type of Chemo if needed post operatively

## 2022-09-14 NOTE — PROGRESS NOTES
US images reviewed- Cyst appears to be simple with a thick wall- no septations or internal growths or solid areas noted  Per pt she has ahd a cyst on that side for some time- just not this large  DWP- will see her Liver MD next week and discuss with him

## 2022-09-19 ENCOUNTER — DOCUMENTATION ONLY (OUTPATIENT)
Dept: HEMATOLOGY | Age: 77
End: 2022-09-19

## 2022-09-22 ENCOUNTER — OFFICE VISIT (OUTPATIENT)
Dept: HEMATOLOGY | Age: 77
End: 2022-09-22
Payer: MEDICARE

## 2022-09-22 VITALS
HEIGHT: 60 IN | BODY MASS INDEX: 21.17 KG/M2 | WEIGHT: 107.8 LBS | DIASTOLIC BLOOD PRESSURE: 63 MMHG | SYSTOLIC BLOOD PRESSURE: 101 MMHG | HEART RATE: 91 BPM | TEMPERATURE: 97.3 F | OXYGEN SATURATION: 99 %

## 2022-09-22 DIAGNOSIS — K74.60 CIRRHOSIS OF LIVER WITHOUT ASCITES, UNSPECIFIED HEPATIC CIRRHOSIS TYPE (HCC): Primary | ICD-10-CM

## 2022-09-22 PROCEDURE — 1101F PT FALLS ASSESS-DOCD LE1/YR: CPT | Performed by: INTERNAL MEDICINE

## 2022-09-22 PROCEDURE — 1123F ACP DISCUSS/DSCN MKR DOCD: CPT | Performed by: INTERNAL MEDICINE

## 2022-09-22 PROCEDURE — G8536 NO DOC ELDER MAL SCRN: HCPCS | Performed by: INTERNAL MEDICINE

## 2022-09-22 PROCEDURE — G8427 DOCREV CUR MEDS BY ELIG CLIN: HCPCS | Performed by: INTERNAL MEDICINE

## 2022-09-22 PROCEDURE — 1090F PRES/ABSN URINE INCON ASSESS: CPT | Performed by: INTERNAL MEDICINE

## 2022-09-22 PROCEDURE — G8420 CALC BMI NORM PARAMETERS: HCPCS | Performed by: INTERNAL MEDICINE

## 2022-09-22 PROCEDURE — G8400 PT W/DXA NO RESULTS DOC: HCPCS | Performed by: INTERNAL MEDICINE

## 2022-09-22 PROCEDURE — G0463 HOSPITAL OUTPT CLINIC VISIT: HCPCS | Performed by: INTERNAL MEDICINE

## 2022-09-22 PROCEDURE — 99214 OFFICE O/P EST MOD 30 MIN: CPT | Performed by: INTERNAL MEDICINE

## 2022-09-22 PROCEDURE — G8510 SCR DEP NEG, NO PLAN REQD: HCPCS | Performed by: INTERNAL MEDICINE

## 2022-09-22 RX ORDER — BUDESONIDE 3 MG/1
3 CAPSULE, COATED PELLETS ORAL DAILY
Qty: 270 CAPSULE | Refills: 3 | Status: SHIPPED | OUTPATIENT
Start: 2022-09-22 | End: 2022-09-25

## 2022-09-22 RX ORDER — MYCOPHENOLATE MOFETIL 500 MG/1
500 TABLET ORAL 2 TIMES DAILY
Qty: 180 TABLET | Refills: 3 | Status: SHIPPED | OUTPATIENT
Start: 2022-09-22

## 2022-09-22 RX ORDER — FUROSEMIDE 40 MG/1
40 TABLET ORAL DAILY
Qty: 90 TABLET | Refills: 3 | Status: SHIPPED | OUTPATIENT
Start: 2022-09-22 | End: 2022-10-19

## 2022-09-22 RX ORDER — SPIRONOLACTONE 100 MG/1
100 TABLET, FILM COATED ORAL DAILY
Qty: 90 TABLET | Refills: 3 | Status: SHIPPED | OUTPATIENT
Start: 2022-09-22

## 2022-09-22 NOTE — Clinical Note
10/16/2022    Patient: Anna Aldridge   YOB: 1945   Date of Visit: 9/22/2022     Syed Sanderson NP  [address] [address]  Via Fax: [phone]     Teo Madison MD   [facility]    Dear SRIDHAR Haas MD,      Thank you for referring Ms. Franklin Waterman to [address] [facility]cutt for evaluation. My notes for this consultation are attached. If you have questions, please do not hesitate to call me. I look forward to following your patient along with you.       Sincerely,    Myrna Tate MD

## 2022-09-22 NOTE — PROGRESS NOTES
Identified pt with two pt identifiers(name and ). Reviewed record in preparation for visit and have obtained necessary documentation. Chief Complaint   Patient presents with    Follow-up    Cirrhosis Of Liver     2month follow with MLS      Vitals:    22 0957   BP: 101/63   Pulse: 91   Temp: 97.3 °F (36.3 °C)   TempSrc: Temporal   SpO2: 99%   Weight: 107 lb 12.8 oz (48.9 kg)   Height: 5' (1.524 m)   PainSc:   0 - No pain       Health Maintenance Review: Patient reminded of \"due or due soon\" health maintenance. I have asked the patient to contact his/her primary care provider (PCP) for follow-up on his/her health maintenance. Coordination of Care Questionnaire:  :   1) Have you been to an emergency room, urgent care, or hospitalized since your last visit? If yes, where when, and reason for visit? no       2. Have seen or consulted any other health care provider since your last visit? If yes, where when, and reason for visit? YES. Gyn      Patient is accompanied by daughter I have received verbal consent from Milena Perez to discuss any/all medical information while they are present in the room.

## 2022-09-23 DIAGNOSIS — K74.60 CIRRHOSIS OF LIVER WITHOUT ASCITES, UNSPECIFIED HEPATIC CIRRHOSIS TYPE (HCC): ICD-10-CM

## 2022-09-25 RX ORDER — BUDESONIDE 3 MG/1
CAPSULE, COATED PELLETS ORAL
Qty: 90 CAPSULE | Refills: 3 | Status: SHIPPED | OUTPATIENT
Start: 2022-09-25

## 2022-09-27 ENCOUNTER — TELEPHONE (OUTPATIENT)
Dept: HEMATOLOGY | Age: 77
End: 2022-09-27

## 2022-09-27 NOTE — TELEPHONE ENCOUNTER
Contacted patient to [facility] December appointment from JAYSHREE to TRAVIS Mailed lab scripts to patient home  Also tagged comment for [facility]F Clinic visit regarding send her to lab    [facility] [phone]   4:16 PM  uninsured, is there a maria fernanda that she may participate in. Left in VM  and Dr Biju Stewart coordinator Vivi Troup # left for rehab to update if qualifies for maria fernanda to exercise.      Future Appointments   Date Time Provider Arcadio Phipps   9/28/2022  8:30 AM [facility] CHF ROOM 1 [facility]   10/6/2022 12:30 PM DO Kendall Ramos [facility]   10/26/2022 11:00 AM Herlinda Thorpe  [facility]

## 2022-10-06 LAB
AFP L3 MFR SERPL: NORMAL % (ref 0–9.9)
AFP SERPL-MCNC: 2.7 NG/ML (ref 0–9.2)
ALBUMIN SERPL-MCNC: 4 G/DL (ref 3.7–4.7)
ALP SERPL-CCNC: 168 IU/L (ref 44–121)
ALT SERPL-CCNC: 66 IU/L (ref 0–32)
AST SERPL-CCNC: 38 IU/L (ref 0–40)
BASOPHILS # BLD AUTO: 0.1 X10E3/UL (ref 0–0.2)
BASOPHILS NFR BLD AUTO: 0 %
BILIRUB DIRECT SERPL-MCNC: 0.8 MG/DL (ref 0–0.4)
BILIRUB SERPL-MCNC: 1.5 MG/DL (ref 0–1.2)
BUN SERPL-MCNC: 36 MG/DL (ref 8–27)
BUN/CREAT SERPL: 34 (ref 12–28)
CALCIUM SERPL-MCNC: 9.7 MG/DL (ref 8.7–10.3)
CHLORIDE SERPL-SCNC: 88 MMOL/L (ref 96–106)
CO2 SERPL-SCNC: 20 MMOL/L (ref 20–29)
CREAT SERPL-MCNC: 1.06 MG/DL (ref 0.57–1)
EGFR: 54 ML/MIN/1.73
EOSINOPHIL # BLD AUTO: 0.1 X10E3/UL (ref 0–0.4)
EOSINOPHIL NFR BLD AUTO: 1 %
ERYTHROCYTE [DISTWIDTH] IN BLOOD BY AUTOMATED COUNT: 13.5 % (ref 11.7–15.4)
GLUCOSE SERPL-MCNC: 298 MG/DL (ref 70–99)
HCT VFR BLD AUTO: 40.4 % (ref 34–46.6)
HGB BLD-MCNC: 14.6 G/DL (ref 11.1–15.9)
IMM GRANULOCYTES # BLD AUTO: 0.2 X10E3/UL (ref 0–0.1)
IMM GRANULOCYTES NFR BLD AUTO: 2 %
INR PPP: 1 (ref 0.9–1.2)
LYMPHOCYTES # BLD AUTO: 1.5 X10E3/UL (ref 0.7–3.1)
LYMPHOCYTES NFR BLD AUTO: 11 %
MCH RBC QN AUTO: 32.1 PG (ref 26.6–33)
MCHC RBC AUTO-ENTMCNC: 36.1 G/DL (ref 31.5–35.7)
MCV RBC AUTO: 89 FL (ref 79–97)
MONOCYTES # BLD AUTO: 1.1 X10E3/UL (ref 0.1–0.9)
MONOCYTES NFR BLD AUTO: 8 %
NEUTROPHILS # BLD AUTO: 11 X10E3/UL (ref 1.4–7)
NEUTROPHILS NFR BLD AUTO: 78 %
PLATELET # BLD AUTO: 182 X10E3/UL (ref 150–450)
POTASSIUM SERPL-SCNC: 4.7 MMOL/L (ref 3.5–5.2)
PROT SERPL-MCNC: 7.4 G/DL (ref 6–8.5)
PROTHROMBIN TIME: 10.9 SEC (ref 9.1–12)
RBC # BLD AUTO: 4.55 X10E6/UL (ref 3.77–5.28)
SODIUM SERPL-SCNC: 127 MMOL/L (ref 134–144)
WBC # BLD AUTO: 14 X10E3/UL (ref 3.4–10.8)

## 2022-10-06 NOTE — PROGRESS NOTES
Next appt 12/22/22. Faxed recent labs to PCP related to elevated glucose level. Sodium (127), creat (1.06). Will discuss sodium w/.  Patient take lasix/spironolactone daily 40mg/100mg from recent med list.

## 2022-10-10 ENCOUNTER — PATIENT MESSAGE (OUTPATIENT)
Dept: HEMATOLOGY | Age: 77
End: 2022-10-10

## 2022-10-11 ENCOUNTER — TELEPHONE (OUTPATIENT)
Dept: HEMATOLOGY | Age: 77
End: 2022-10-11

## 2022-10-11 NOTE — TELEPHONE ENCOUNTER
Marilin, the patient's daughter called today and also sent a staff message to [facility] yesterday regarding her mother getting in to see an endocrinologist.  They were not able to get an appt until January 2023 and wanted tspeak with you to see if there was something our office could say or do to get her seen with them sooner.

## 2022-10-12 NOTE — TELEPHONE ENCOUNTER
[email or web address] Spoke w/daughter in totally understanding frustration with trying to det appt w/Endocrinologist. Advise daughter to call [facility] maybe she can get a sooner appt. Patient will continue to follow up and keep our office posted. (KF)

## 2022-10-16 PROBLEM — K22.70 BARRETT'S ESOPHAGUS: Status: ACTIVE | Noted: 2022-10-16

## 2022-10-16 PROBLEM — R18.8 ASCITES: Status: ACTIVE | Noted: 2022-01-01

## 2022-10-16 NOTE — PROGRESS NOTES
[address] [facility] 2014 Reed Wheeler MD, JEM Vallecillo, [facility]   Jovany Alvarado [facility]   Rosemarie Anaya, FESTUS Luz, [facility]       Concepcion Hackett [address]    at [address], [address] [place] [address], [facility].    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  ICD-9-CM: 530.85  10/16/2022        Abnormal uterine bleeding (AUB) ICD-10-CM: N93.9  ICD-9-CM: 626.9  9/7/2022        Recent unexplained weight loss ICD-10-CM: R63.4  ICD-9-CM: 783.21  9/7/2022        Uterine fibroid ICD-10-CM: D25.9  ICD-9-CM: 218.9  7/3/2022        Cirrhosis ([facility] ICD-10-CM: K74.60  ICD-9-CM: 571.5  6/24/2022        Type 2 diabetes mellitus ([facility] ICD-10-CM: E11.9  ICD-9-CM: 250.00  Unknown        GERD (gastroesophageal reflux disease) ICD-10-CM: K21.9  ICD-9-CM: 530.81  Unknown        Lupus ([facility] ICD-10-CM: M32.9  ICD-9-CM: 710.0  Unknown        History of cholecystectomy ICD-10-CM: Z90.49  ICD-9-CM: V45.79  Unknown        H/O lumbosacral spine surgery ICD-10-CM: Z98.890  ICD-9-CM: V15.29  Unknown        Autoimmune hepatitis ([facility] ICD-10-CM: K75.4  ICD-9-CM: 571.42  2021           Yvan Durbin is being seen at [address] for management of 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 The active problem list, all pertinent past medical history, medications, liver histology, endoscopic studies, radiologic findings and laboratory findings related to the liver disorder were reviewed and discussed with the patient. The patient is a 69 y.o.  female who was found to have elevated liver transaminases and TBILI to 3.2 mg in 5/2021. Serologic evaluation for markers of chronic liver disease was positive for ASMA,     Liver biopsy in 11/2021 demonstrated severe portal inflammation with interface inflammation, PMN and stage and bridging necrosis, and possibly lobular collapse. Given the intense inflammation the degree of fibrosis was difficult to assess. Best guess is stage 3 fibrosis. The patient is being treated with budesonide 9 mg and cellcept 500 mg BID. The patient has the following symptoms which could be attributed to the liver disorder:    fatigue,     The patient is not experiencing the following symptoms which are commonly seen in this liver disorder:   pain in the right side over the liver,     The patient has Moderate limitations in functional activities which can be attributed to the liver disease       ASSESSMENT AND PLAN:  Cirrhosis  The diagnosis of cirrhosis is based upon liver biopsy, Fibroscan, imaging, laboratory studies, complications of cirrhosis. 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 The patient has normal liver function. The CTP is 5. Child class A. The MELD score is 10. Autoimmune Hepatitis   The diagnosis was based upon serology, liver biopsy, other coexistant autoimmune disorders    A liver biopsy performed in 11/2021 shows severe inflammation, interface hepatitis, piecemeal necrosis, bridign necrosis, possibly lobular collapse, and stage 3 fibrosis    Fibroscan performed in 6/2022 demonstrated a liver stiffness of 55.6 consistent with stage 4 fibrosis.     Severe inflammation present in the liver biopsy can increase liver stiffness and result in an overestimate in the degree of fibrosis     The patient was started on budesonide 9 mg every day   She developed swelling of the lower extremities and stopped treatment. Budesonide 9 mg every day was restarted along with cellcept 500 mg every day. The liver enzymes are down significantly from pretreatment values in the 600s but are stuck in the 60/120s  Will increase the dose of cellcept to 1000 mg BID. Once we have her on a stable high does of cellcept will start to taper the budesonide. And leae her on cellcept alone. At her age we need to balance immune suppression and infection risk with simply slowing down disease progression. The initial Fibroscan suggested cirrhosis but may have been an over estimate given the intense inflammation seen on liver biopsy. Will repeat the Fibroscan at the next office appointment now that liver enzymes are down and stable    Ascites   Ascites developed for the first time in 7/2022. This was minimal and seen on US only. Ascites resolved with step 1 dose of diuretics. She stopped lasix and remains on aldactone 100 mg every day. Labs show a drop in Sna. Will stop the aldactone and see if she gets ascites back. The patient was counseled regarding the need to maintain sodium restriction and the types of foods containing high amounts of sodium to be avoided. Lower extremity edema  Edema has resolved with step 1 diuretics. She is now on aldactone only at step 0/100. Will stop aldactone because of hypoNA    Hyponatremia  This is secondary to cirrhosis and diuretics  This appears to be slowly worsening with Sna down to 127  Will stop diuretics. Screening for Esophageal varices   The patient does not have esophageal or gastric varices. The last EGD to assess for varices was performed in 1/2022.     Will schedule for EGD to assess for varices and need for additional banding in 3 years. Screening for Hepatocellular Carcinoma  [facility] was performed in 7/2022 and does not suggest [facility] Will repeat ultrasound in 6 months. Treatment of other medical problems in patients with chronic liver disease  There are no contraindications for the patient to take most medications that are necessary for treatment of other medical issues. The patient has cirrhrosis and should avoid taking NSAIDs which are associated with a higher rate of developing SAMMY. The patient can take any medications utilized for treatment of DM, statins to treat hypercholesterolemia    Normal doses of acetaminophen, as recommended on the label of the bottle, are not hepatotoxic except in the setting of daily alcohol use, even in patients with cirrhosis and can be utilized for pain. Counseling for alcohol in patients with chronic liver disease  The patient was counseled regarding alcohol consumption and the effect of alcohol on chronic liver disease. The patient does not consume any significant amount of alcohol. Vaccinations   The need for vaccination against viral hepatitis A and B will be assessed with serologic and instituted as appropriate. The patient has received 2 doses of COVID-19 vaccine. Routine vaccinations against other bacterial and viral agents can be performed as indicated. Annual flu vaccination should be administered if indicated. ALLERGIES  Allergies   Allergen Reactions    Budesonide Other (comments)     Patient states she is not allergic    Pcn [Penicillins] Rash       MEDICATIONS  Current Outpatient Medications   Medication Sig    budesonide (ENTOCORT EC) 3 mg capsule TAKE 3 CAPS BY MOUTH DAILY FOR 8 WEEKS    mycophenolate (CellCept) 500 mg tablet Take 1 Tablet by mouth two (2) times a day. furosemide (Lasix) 40 mg tablet Take 1 Tablet by mouth daily. spironolactone (Aldactone) 100 mg tablet Take 1 Tablet by mouth daily.     glimepiride (AMARYL) 1 mg tablet Take 1 mg by mouth daily. pantoprazole (Protonix) 40 mg tablet Take 1 Tablet by mouth daily. hydrOXYchloroQUINE (PLAQUENIL) 200 mg tablet Take 200 mg by mouth daily. No current facility-administered medications for this visit. SYSTEM REVIEW NOT RELATED TO LIVER DISEASE OR REVIEWED ABOVE:  Constitution systems: Negative for fever, chills, weight gain, weight loss. Eyes: Negative for visual changes. ENT: Negative for sore throat, painful swallowing. Respiratory: Negative for cough, hemoptysis, SOB. Cardiology: Negative for chest pain, palpitations. GI:  Negative for constipation or diarrhea. : Negative for urinary frequency, dysuria, hematuria, nocturia. Skin: Negative for rash. Hematology: Negative for easy bruising, blood clots. Musculo-skelatal: Negative for back pain, muscle pain, weakness. Neurologic: Negative for headaches, dizziness, vertigo, memory problems not related to HE. Psychology: Negative for anxiety, depression. FAMILY HISTORY:  The father  of MI. The mother  at age 80 years. There is no family history of liver disease. SOCIAL HISTORY:  The patient is . The patient has 1 child and 1 grandchild  The patient has never used tobacco products. The patient has previously consumed 1 alcoholic beverages per day   The patient has been abstinent from alcohol since  when she first developed AIH. The patient used to work as . PHYSICAL EXAMINATION:  Visit Vitals  /63 (BP 1 Location: Left upper arm, BP Patient Position: Sitting, BP Cuff Size: Adult)   Pulse 91   Temp 97.3 °F (36.3 °C) (Temporal)   Ht 5' (1.524 m)   Wt 107 lb 12.8 oz (48.9 kg)   SpO2 99%   BMI 21.05 kg/m²     General: No acute distress. Eyes: Sclera anicteric. ENT: No oral lesions. Thyroid normal.  Nodes: No adenopathy. Skin: No spider angiomata. No jaundice. No palmar erythema. Respiratory: Lungs clear to auscultation. Cardiovascular: Regular heart rate. No murmurs. No JVD. Abdomen: Soft non-tender. Liver size normal to percussion/palpation. Spleen not palpable. No obvious ascites. Extremities: No edema. No muscle wasting. No gross arthritic changes. Neurologic: Alert and oriented. Cranial nerves grossly intact. No asterixis. LABORATORY STUDIES:  Liver [place] [address] & Units 6/24/2022 6/10/2022   WBC 3.6 - 11.0 K/uL  6.2   ANC 1.8 - 8.0 K/UL  4.6   HGB 11.5 - 16.0 g/dL  11.4 (L)    - 400 K/uL  141 (L)   INR 0.9 - 1.1    1.2 (H)   AST 15 - 37 U/L 58 (H) 52 (H)   ALT 12 - 78 U/L 38 35   Alk Phos 45 - 117 U/L 168 (H) 174 (H)   Bili, Total 0.2 - 1.0 MG/DL 1.8 (H) 1.7 (H)   Bili, Direct 0.0 - 0.2 MG/DL  0.9 (H)   Albumin 3.5 - 5.0 g/dL 2.9 (L) 2.7 (L)   BUN 6 - 20 MG/DL 12 15   Creat 0.55 - 1.02 MG/DL 1.06 (H) 1.07 (H)   Na 136 - 145 mmol/L 143 140   K 3.5 - 5.1 mmol/L 4.0 3.9   Cl 97 - 108 mmol/L 108 108   CO2 21 - 32 mmol/L 29 27   Glucose 65 - 100 mg/dL 119 (H) 215 (H)     SEROLOGIES:  Serologies Latest Ref Rng & Units 6/10/2022   NIMO, IFA  Negative   ASMCA 0 - 19 Units 57 (H)   Anti-SLA 0.0 - 20.0 units 1.1   LKM 0.0 - 20.0 Units 1.0       LIVER HISTOLOGY:  11/2021. Slides reviewed by MLS. Severe hepatitis. severe portal inflammation, with severe interface hepatitis, with moderate PMN. Bile ducts are normal.  moderate lobular inflammation. no steatosis. The degree of fibrosis is difficult to estimate given the severe diffuse inflammation. Best estimate of fbrosis is stage 2-3. Knodell score (7475). 6/2022. [facility]. EkPa was 55.6. IQR/med 64%. . The results suggested a fibrosis level of F4. The high IQR% suggests that the measurement may not be reliable. The CAP score suggests there is no significant hepatic steatosis. ENDOSCOPIC PROCEDURES:  Not available or performed    RADIOLOGY:  5/2021. Ultrasound of liver. Echogenic consistent with chronic liver disease. No liver mass lesions. No dilated bile ducts. No ascites. 6/2021. Dynamic MRI of liver. Normal appearing liver. No liver mass lesions. Normal spleen. Mild dilated bile ducts normal for post-cholecystectomy. No bile duct strictures. No CBD stones. No ascites. OTHER TESTING:  Not available or performed    FOLLOW-UP:  All of the issues listed above in the Assessment and Plan were discussed with the patient. All questions were answered. The patient expressed a clear understanding of the above. [address] [place] [address] in 3 months for monitoring.         Rob Ambriz MD  [facility] [address]  [phone]  [address] [place] [address]

## 2022-10-18 PROBLEM — J18.9 PNEUMONIA: Status: ACTIVE | Noted: 2022-01-01

## 2022-10-18 NOTE — ED TRIAGE NOTES
Daughter stated that pts condition is declining, +fatigue and +weakness, denies n/v, denies cp, sob, fever or cough

## 2022-10-18 NOTE — ED NOTES
Bedside and Verbal shift change report given to Vikki Bowen RN (oncoming nurse) by La Barrios RN (offgoing nurse). Report included the following information SBAR, ED Summary, MAR and Recent Results.

## 2022-10-18 NOTE — TELEPHONE ENCOUNTER
----- Message from Dax Verduzco MD sent at 10/16/2022  6:04 PM EDT -----  Regarding: I called her and left VM. Please confirm the following with her:  Serum NA is low. This is due to aldactone. Stop aldactone. Increase cellcept to 2x 500 mg pills BID. Move up FU appt to 1 month with FS. She is currently on Tammie schedule  You can keep her there. Make sure FU appt is on a day I am there to supervise Tammie [email or web address] Patient in [facility] [facility] now for slurred speech, elevated blood sugar and unable to stand or walk. Above message unable to patient this morning. (KF)

## 2022-10-18 NOTE — ED PROVIDER NOTES
Patient is a 15-year-old female history of GERD, lupus,, autoimmune hepatitis (on immunosuppression) presenting today with generalized fatigue. It has been going on for about 2 weeks now, especially over the past week. She has become wheelchair-bound and requiring significant assistance for transfers and performing ADLs. Patient denies any specific complaints other than generalized fatigue. No fever, chest pain, shortness of breath, cough, focal weakness/numbness, headaches or neck pain, wounds, urinary symptoms. 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 Family worried about her wellbeing at home.        Past Medical History:   Diagnosis Date    Autoimmune hepatitis ([address] 2021    GERD (gastroesophageal reflux disease)     Lupus (HCC)     lupus    PMB (postmenopausal bleeding) 08/2022    Uterine fibroid 07/03/2022       Past Surgical History:   Procedure Laterality Date    BIOPSY LIVER  10/2021    [facility]    HX CARPAL TUNNEL RELEASE      HX CHOLECYSTECTOMY      HX ORTHOPAEDIC      back     HX OTHER SURGICAL      gallbladder removal    IR BX LIVER PERCUTANEOUS  11/9/2021    [place] BREAST SURGERY PROCEDURE UNLISTED Left     lumpectomy         Family History:   Problem Relation Age of Onset    Cancer Mother         breast    Lupus Mother     No Known Problems Father        Social History     Socioeconomic History    Marital status:      Spouse name: Not on file    Number of children: Not on file    Years of education: Not on file    Highest education level: Not on file   Occupational History    Not on file   Tobacco Use    Smoking status: Never    Smokeless tobacco: Never   Vaping Use    Vaping Use: Never used   Substance and Sexual Activity    Alcohol use: Not Currently    Drug use: Never    Sexual activity: Not Currently     Comment: SALVATORE   Other Topics Concern    Not on file   Social History Narrative    Not on file Social Determinants of Health     Financial Resource Strain: Not on file   Food Insecurity: Not on file   Transportation Needs: Not on file   Physical Activity: Not on file   Stress: Not on file   Social Connections: Not on file   Intimate Partner Violence: Not on file   Housing Stability: Not on file         ALLERGIES: Budesonide and Pcn [penicillins]    Review of Systems   Constitutional:  Positive for fatigue. Negative for chills and fever. HENT:  Negative for congestion and rhinorrhea. Eyes:  Negative for redness and visual disturbance. Respiratory:  Negative for cough and shortness of breath. Cardiovascular:  Negative for chest pain and leg swelling. Gastrointestinal:  Negative for abdominal pain, diarrhea, nausea and vomiting. Genitourinary:  Negative for dysuria, flank pain, frequency, hematuria and urgency. Musculoskeletal:  Negative for arthralgias, back pain, myalgias and neck pain. Skin:  Negative for rash and wound. Allergic/Immunologic: Negative for immunocompromised state. Neurological:  Negative for dizziness and headaches. Vitals:    10/18/22 1700 10/18/22 1715 10/18/22 1730 10/18/22 1745   BP: (!) 147/79 (!) 144/88 (!) 151/101 126/82   Pulse: (!) 106 95 90 (!) 101   Resp: 18 17 18 17   Temp:       SpO2: 96% 96% 95% 96%            Physical Exam  Vitals and nursing note reviewed. Constitutional:       General: She is not in acute distress. Appearance: She is well-developed. She is ill-appearing. She is not diaphoretic. HENT:      Head: Normocephalic. Mouth/Throat:      Pharynx: No oropharyngeal exudate. Eyes:      General:         Right eye: No discharge. Left eye: No discharge. Pupils: Pupils are equal, round, and reactive to light. Cardiovascular:      Rate and Rhythm: Regular rhythm. Tachycardia present. Heart sounds: Normal heart sounds. No murmur heard. No friction rub. No gallop.    Pulmonary:      Effort: Pulmonary effort is normal. No respiratory distress. Breath sounds: Normal breath sounds. No stridor. No wheezing or rales. Abdominal:      General: Bowel sounds are normal. There is no distension. Palpations: Abdomen is soft. Tenderness: There is no abdominal tenderness. There is no guarding or rebound. Musculoskeletal:         General: No deformity. Normal range of motion. Cervical back: Normal range of motion and neck supple. Skin:     General: Skin is warm and dry. Capillary Refill: Capillary refill takes less than 2 seconds. Findings: No rash. Neurological:      Mental Status: She is alert and oriented to person, place, and time. Psychiatric:         Behavior: Behavior normal.        MDM     Amount and/or Complexity of Data Reviewed  Clinical lab tests: reviewed  Tests in the radiology section of CPT®: reviewed  Decide to obtain previous medical records or to obtain history from someone other than the patient: yes      ED Course as of 10/18/22 2122   Tue Oct 18, 2022   1143 EDMD EKG interpretation: There is normal sinus rhythm at 109 beats a minute. Axis is leftward. No ectopy or acute ischemic changes noted. There is poor R wave progression across the entire precordium. [RP]      ED Course User Index  [RP] Guillermo Allison MD       Procedures      Work-up:  Troponin elevated, repeat downtrending  Ammonia normal  UA with blood and glucose  Leukocytosis  No anemia  Pseudohyponatremia, corrects to 133 with glucose    Chest x-ray showing questionable bibasilar pneumonia    Patient is a 61-year-old female presenting today with progressively worsening generalized fatigue to the point where she is now having difficulty with performing ADLs without significant assistance. Labs today show leukocytosis, she is also tachycardic with questionable pneumonia on chest x-ray.   Blood cultures and lactic acid pending, will be treating with ceftriaxone/azithromycin for potential community-acquired pneumonia. Being especially cautious regarding possible underlying infection given history of immunosuppression. Her liver enzymes look okay/baseline today. She does not appear encephalopathic to me and has a normal ammonia. Renal function is okay. She is hyperglycemic with an associated pseudohyponatremia. Patient and family not comfortable with her going home at this point. Will admit patient for further management. Viv Mccann Consult for Admission  7:29 PM    ED Room Number: ER26/26  Patient Name and age:  Modesto Hernandez 69 y.o.  female  Working Diagnosis:   1. SIRS (systemic inflammatory response syndrome) (HCC)    2. Pneumonia of both lower lobes due to infectious organism    3. Autoimmune hepatitis ([address]    4. Malaise and fatigue    5. Hyperglycemia        COVID-19 Suspicion:  no  Sepsis present:  no  Reassessment needed: no  Code Status:  Full Code  Readmission: no  Isolation Requirements:  no  Recommended Level of Care:  telemetry  Department:[facility] Adult ED - 21   Other:  69 y.o. female immunosuppressed with autoimmune hepatitis and today with fatigue/difficulty performing adl. Tachycardia, leukocytosis, possible pna on cxr. Requesting admit for further management. Zeke Solis, DO      After discussion with hospitalist patient's lactic acid came back elevated at 4.8. I suspect that this is likely affected by her liver disease. I have ordered 30 mL/kg of IV fluids. She is already received antibiotics and cultures have been sent. She is normotensive, mildly tachycardic, mentating appropriately and appears to have appropriate perfusion.

## 2022-10-19 NOTE — H&P
Hospitalist Admission Note    NAME: Felton Chavez   :  1945   MRN:  [id number]     Date/Time:  10/18/2022 10:23 PM    Patient PCP: Breezy Mcintyre NP    Chief complaint on presentation:  Generalized weakness/fatigue/both legs weakness resulting in difficult ambulation, almost becoming wheelchair-bound status requiring significant assistance for transferring and performing daily activities worsening for the last 2 weeks  History of present illness:  Patient is a very pleasant 55-year-old  female with past medical history significant for autoimmune hepatitis with cirrhosis, history of systemic lupus erythematosus, chronic immunosuppression, secondary diabetes mellitus who happened to be in her usual state of health until 2 weeks ago, when she started feeling very weak and fatigued, especially weak in the lower extremity with difficulty standing up and using her walker, to a point where she can hardly walk. Because of the worsening symptoms, patient's family brought the patient to the emergency room for further evaluation. On arrival to the emergency room patient was noted to be afebrile with a temp of 98.4 along with significantly hypertension with a blood pressure 151/101 with respiratory rate of 18 and SPO2 of 96% on room air. Labs including CBC/BMP showed leukocytosis with white count of 15.2 while glucose was noted to be significantly elevated at 239 along with a BUN of 29 and creatinine of 0.84. Lactic acid was noted to be significantly elevated at 4.8. A chest x-ray was done which was consistent with minimal bibasilar opacities concerning for pneumonia. In the emergency room patient received, IV antibiotic including Rocephin and Zithromax and is being admitted to the floor for further work-up and management.       Past Medical History:   Diagnosis Date    Autoimmune hepatitis ([address]     GERD (gastroesophageal reflux disease)     Lupus (HCC)     lupus    PMB (postmenopausal bleeding) 08/2022    Uterine fibroid 07/03/2022      Past Surgical History:   Procedure Laterality Date    BIOPSY LIVER  10/2021    [facility]    HX CARPAL TUNNEL RELEASE      HX CHOLECYSTECTOMY      HX ORTHOPAEDIC      back     HX OTHER SURGICAL      gallbladder removal    IR BX LIVER PERCUTANEOUS  11/9/2021    [place] BREAST SURGERY PROCEDURE UNLISTED Left     lumpectomy     Social History     Tobacco Use    Smoking status: Never    Smokeless tobacco: Never   Substance Use Topics    Alcohol use: Not Currently      Family History   Problem Relation Age of Onset    Cancer Mother         breast    Lupus Mother     No Known Problems Father         Allergies   Allergen Reactions    Budesonide Other (comments)     Patient states she is not allergic    Pcn [Penicillins] Rash        Prior to Admission medications    Medication Sig Start Date End Date Taking? Authorizing Provider   budesonide (ENTOCORT EC) 3 mg capsule TAKE 3 CAP BY MOUTH DAILY. 9/25/22   Terence Bee MD   furosemide (Lasix) 40 mg tablet Take 1 Tablet by mouth daily. 9/22/22   Terence Bee MD   spironolactone (Aldactone) 100 mg tablet Take 1 Tablet by mouth daily. 9/22/22   Terence Bee MD   mycophenolate (CellCept) 500 mg tablet Take 1 Tablet by mouth two (2) times a day. 9/22/22   Terence Bee MD   glimepiride (AMARYL) 1 mg tablet Take 1 mg by mouth daily. 5/17/22   Provider, Historical   pantoprazole (Protonix) 40 mg tablet Take 1 Tablet by mouth daily. 12/10/21   Laura Bueno MD   hydrOXYchloroQUINE (PLAQUENIL) 200 mg tablet Take 200 mg by mouth daily.     Provider, Historical     REVIEW OF SYSTEMS:    General: Positive for generalized weakness, negative for fever, chills, sweats, weight loss  Eyes: negative for blurred vision, eye pain, loss of vision, diplopia  Ear Nose and Throat: negative for rhinorrhea, pharyngitis, otalgia, tinnitus, speech or swallowing difficulties  Respiratory:  negative for pleuritic pain, cough, sputum production, wheezing, SOB, MCNEAL  Cardiology:  Positive for bilateral lower extremity edema,negative for chest pain, palpitations, orthopnea,  Gastrointestinal:  negative for abdominal pain, N/V, dysphagia, change in bowel habits, bleeding  Genitourinary: negative for frequency, urgency, dysuria, hematuria, incontinence  Muskuloskeletal : negative for arthralgia, myalgia  Hematology: negative for easy bruising, bleeding, lymphadenopathy  Dermatological: negative for rash, ulceration, mole change, new lesion  Endocrine: negative for hot flashes or polydipsia  Neurological: Positive for bilateral lower extremity weakness ,negative for headache, dizziness, confusion, focal weakness, paresthesia, memory loss, gait disturbance  Psychological: negative for anxiety, depression, agitation    Physical Exam:  Visit Vitals  /82   Pulse (!) 101   Temp 97.2 °F (36.2 °C)   Resp 17   SpO2 96%     General: Sick looking female,  in no acute distress  HEENT:  Pink conjunctivae, PERRL, hearing intact to voice, moist mucous membranes  Neck: Supple, without masses, thyroid non-tender  Resp: No accessory muscle use, clear breath sounds without wheezes rales or rhonchi  Card: No murmurs, normal S1, S2 without thrills, bruits or peripheral edema  Abd:  Soft, non-tender, slightly distended, normoactive bowel sounds are present, no palpable organomegaly and no detectable hernias  Lymph:  No cervical or inguinal adenopathy  Musc: No cyanosis or clubbing  Skin: No rashes or ulcers, skin turgor is good  Neuro:  Cranial nerves are grossly intact, b/l lower extremity 4/5 weakness, follows commands appropriately  Psych:  Good insight, oriented to person, place and time, alert    LAB DATA REVIEWED:    Recent Results (from the past 24 hour(s))   EKG, 12 LEAD, INITIAL    Collection Time: 10/18/22 11:35 AM   Result Value Ref Range    Ventricular Rate 109 BPM    Atrial Rate 109 BPM    P-R Interval 136 ms    QRS Duration 76 ms Q-T Interval 310 ms    QTC Calculation (Bezet) 417 ms    Calculated P Axis 47 degrees    Calculated R Axis -22 degrees    Calculated T Axis 43 degrees    Diagnosis       Sinus tachycardia  Minimal voltage criteria for LVH, may be normal variant ( R in aVL )  Anteroseptal infarct (cited on or before 03-JUL-2022)  When compared with ECG of 03-JUL-2022 13:05,  Questionable change in initial forces of Anterior leads  Confirmed by Francisco J Watters M.D., Thomas Scrape ([id number]) on 10/18/2022 5:75:21 PM     METABOLIC PANEL, COMPREHENSIVE    Collection Time: 10/18/22 12:14 PM   Result Value Ref Range    Sodium 128 (L) 136 - 145 mmol/L    Potassium 4.8 3.5 - 5.1 mmol/L    Chloride 96 (L) 97 - 108 mmol/L    CO2 20 (L) 21 - 32 mmol/L    Anion gap 12 5 - 15 mmol/L    Glucose 325 (H) 65 - 100 mg/dL    BUN 37 (H) 6 - 20 MG/DL    Creatinine 1.28 (H) 0.55 - 1.02 MG/DL    BUN/Creatinine ratio 29 (H) 12 - 20      eGFR 43 (L) >60 ml/min/1.73m2    Calcium 9.7 8.5 - 10.1 MG/DL    Bilirubin, total 1.5 (H) 0.2 - 1.0 MG/DL    ALT (SGPT) 77 12 - 78 U/L    AST (SGOT) 38 (H) 15 - 37 U/L    Alk. phosphatase 169 (H) 45 - 117 U/L    Protein, total 7.6 6.4 - 8.2 g/dL    Albumin 3.1 (L) 3.5 - 5.0 g/dL    Globulin 4.5 (H) 2.0 - 4.0 g/dL    A-G Ratio 0.7 (L) 1.1 - 2.2     CBC WITH AUTOMATED DIFF    Collection Time: 10/18/22 12:14 PM   Result Value Ref Range    WBC 15.0 (H) 3.6 - 11.0 K/uL    RBC 4.57 3.80 - 5.20 M/uL    HGB 14.9 11.5 - 16.0 g/dL    HCT 42.9 35.0 - 47.0 %    MCV 93.9 80.0 - 99.0 FL    MCH 32.6 26.0 - 34.0 PG    MCHC 34.7 30.0 - 36.5 g/dL    RDW 14.7 (H) 11.5 - 14.5 %    PLATELET 142 752 - 358 K/uL    MPV 9.8 8.9 - 12.9 FL    NRBC 0.0 0  WBC    ABSOLUTE NRBC 0.00 0.00 - 0.01 K/uL    NEUTROPHILS 86 (H) 32 - 75 %    LYMPHOCYTES 8 (L) 12 - 49 %    MONOCYTES 4 (L) 5 - 13 %    EOSINOPHILS 1 0 - 7 %    BASOPHILS 0 0 - 1 %    MYELOCYTES 1 (H) 0 %    IMMATURE GRANULOCYTES 0 %    ABS. NEUTROPHILS 12.9 (H) 1.8 - 8.0 K/UL    ABS.  LYMPHOCYTES 1.2 0.8 - 3.5 K/UL    ABS. MONOCYTES 0.6 0.0 - 1.0 K/UL    ABS. EOSINOPHILS 0.2 0.0 - 0.4 K/UL    ABS. BASOPHILS 0.0 0.0 - 0.1 K/UL    ABS. IMM. GRANS. 0.0 K/UL    DF MANUAL      RBC COMMENTS NORMOCYTIC, NORMOCHROMIC     TROPONIN-HIGH SENSITIVITY    Collection Time: 10/18/22 12:14 PM   Result Value Ref Range    Troponin-High Sensitivity 56 (H) 0 - 51 ng/L   SAMPLES BEING HELD    Collection Time: 10/18/22 12:14 PM   Result Value Ref Range    SAMPLES BEING HELD 1BLU 1RED     COMMENT        Add-on orders for these samples will be processed based on acceptable specimen integrity and analyte stability, which may vary by analyte. URINALYSIS W/MICROSCOPIC    Collection Time: 10/18/22  3:13 PM   Result Value Ref Range    Color YELLOW/STRAW      Appearance CLEAR CLEAR      Specific gravity 1.028 1.003 - 1.030      pH (UA) 5.0 5.0 - 8.0      Protein Negative NEG mg/dL    Glucose >1,000 (A) NEG mg/dL    Ketone Negative NEG mg/dL    Bilirubin Negative NEG      Blood MODERATE (A) NEG      Urobilinogen 0.2 0.2 - 1.0 EU/dL    Nitrites Negative NEG      Leukocyte Esterase Negative NEG      WBC 0-4 0 - 4 /hpf    RBC 5-10 0 - 5 /hpf    Epithelial cells FEW FEW /lpf    Bacteria Negative NEG /hpf    Hyaline cast 0-2 0 - 5 /lpf   URINE CULTURE HOLD SAMPLE    Collection Time: 10/18/22  3:13 PM    Specimen: Serum; Urine   Result Value Ref Range    Urine culture hold        Urine on hold in Microbiology dept for 2 days. If unpreserved urine is submitted, it cannot be used for addtional testing after 24 hours, recollection will be required.    TROPONIN-HIGH SENSITIVITY    Collection Time: 10/18/22  5:28 PM   Result Value Ref Range    Troponin-High Sensitivity 48 0 - 51 ng/L   AMMONIA    Collection Time: 10/18/22  5:28 PM   Result Value Ref Range    Ammonia, plasma 12 <32 UMOL/L   LACTIC ACID    Collection Time: 10/18/22  8:26 PM   Result Value Ref Range    Lactic acid 4.8 (HH) 0.4 - 2.0 MMOL/L     Assessment:  Sepsis with bibasilar pulmonary airspace disease; likely developing pneumonia. (Leukocytosis of 15,000, tachycardia at 110, lactic acidosis at 4.8). Worsening generalized weakness/fatigue; likely secondary to worsening underlying lupus. Difficulty standing and ambulation secondary to weakness and lower extremity, possibly myopathy versus myelopathy. Chronic immunosuppression. Autoimmune hepatitis with liver cirrhosis. History of systemic lupus erythematosus. Hypertension. Type 2 diabetes mellitus; likely secondary due to medications. Plans:  Patient being admitted to the floor for continuing IV antibiotic Rocephin and Zithromax while awaiting blood cultures and sputum culture. Cautiously hydrate patient with normal saline with close monitoring of daily weight. Will request neurology consult for further evaluation of bilateral lower extremity weakness, while awaiting physical therapy evaluation. Resume patient home medication including immunosuppressive, mycophenolate and Plaquenil. Continue with home dose of Lasix and spironolactone, hold patient's home dose of glimepiride and start sliding scale coverage with low-dose insulin lispro as per protocol and check hemoglobin A1c. Follow-up labs in the morning including CBC, BMP, check TSH, vitamin Q40 and folic acid. DVT prophylaxis; started Lovenox. Further plan as per patient response to current management.

## 2022-10-19 NOTE — CONSULTS
INPATIENT NEUROLOGY CONSULTATION  10/19/2022     Consulted by: Hue Kennedy MD        Patient ID:  Yvan Durbin  [id number]  69 y.o.  1945    Chief Complaint   Patient presents with    Fatigue       HPI    Mrs. Miladys Culver is a 71-year-old woman with a history of lupus and autoimmune hepatitis who is here because she has had progressive weakness and inability to walk. I spoke with her personally and examined her at the bedside. About 4 weeks ago she began to have difficulty walking and began to use a Rollator which is new. Then 2 weeks ago she got so severe she is now using a wheelchair. Both legs are weak left more than right. No pain. No numbness. No symptoms in the arms head or neck. No difficulty breathing. No change in vision. She tells me about a week before symptoms began she got her flu shot. She has not had any improvement. She has had falls because of the weakness. Work-up here in the hospital showed evidence of pneumonia possible sepsis. She is afebrile. Review of Systems   Eyes:  Negative for double vision. Respiratory:  Negative for shortness of breath. Gastrointestinal:  Negative for nausea. Musculoskeletal:  Positive for falls. Negative for back pain. Neurological:  Positive for focal weakness and weakness. All other systems reviewed and are negative.     Past Medical History:   Diagnosis Date    Autoimmune hepatitis ([facility] [address] 2021    GERD (gastroesophageal reflux disease)     Lupus (HCC)     lupus    PMB (postmenopausal bleeding) 08/2022    Uterine fibroid 07/03/2022     Family History   Problem Relation Age of Onset    Cancer Mother         breast    Lupus Mother     No Known Problems Father      Social History     Socioeconomic History    Marital status:      Spouse name: Not on file    Number of children: Not on file    Years of education: Not on file    Highest education level: Not on file   Occupational History    Not on file   Tobacco Use    Smoking status: Never    Smokeless tobacco: Never   Vaping Use    Vaping Use: Never used   Substance and Sexual Activity    Alcohol use: Not Currently    Drug use: Never    Sexual activity: Not Currently     Comment: SALVATORE   Other Topics Concern    Not on file   Social History Narrative    Not on file     Social Determinants of Health     Financial Resource Strain: Not on file   Food Insecurity: Not on file   Transportation Needs: Not on file   Physical Activity: Not on file   Stress: Not on file   Social Connections: Not on file   Intimate Partner Violence: Not on file   Housing Stability: Not on file     Current Facility-Administered Medications   Medication Dose Route Frequency    glucose chewable tablet 16 g  4 Tablet Oral PRN    glucagon (GLUCAGEN) injection 1 mg  1 mg IntraMUSCular PRN    dextrose 10 % infusion 0-250 mL  0-250 mL IntraVENous PRN    insulin lispro (HUMALOG) injection   SubCUTAneous AC&HS    sodium chloride (NS) flush 5-40 mL  5-40 mL IntraVENous Q8H    sodium chloride (NS) flush 5-40 mL  5-40 mL IntraVENous PRN    acetaminophen (TYLENOL) tablet 650 mg  650 mg Oral Q6H PRN    Or    acetaminophen (TYLENOL) suppository 650 mg  650 mg Rectal Q6H PRN    polyethylene glycol (MIRALAX) packet 17 g  17 g Oral DAILY PRN    ondansetron (ZOFRAN ODT) tablet 4 mg  4 mg Oral Q8H PRN    Or    ondansetron (ZOFRAN) injection 4 mg  4 mg IntraVENous Q6H PRN    budesonide (ENTOCORT EC) capsule 9 mg  9 mg Oral DAILY    furosemide (LASIX) tablet 40 mg  40 mg Oral DAILY    hydrOXYchloroQUINE (PLAQUENIL) tablet 200 mg  200 mg Oral DAILY    mycophenolate mofetil (CELLCEPT) capsule 500 mg  500 mg Oral ACB&D    pantoprazole (PROTONIX) tablet 40 mg  40 mg Oral DAILY    spironolactone (ALDACTONE) tablet 100 mg  100 mg Oral DAILY    azithromycin (ZITHROMAX) 250 mg in 0.9% sodium chloride 250 mL IVPB  250 mg IntraVENous Q24H    cefTRIAXone (ROCEPHIN) 1 g in 0.9% sodium chloride 10 mL IV syringe  1 g IntraVENous Q24H    0.9% sodium chloride infusion  75 mL/hr IntraVENous CONTINUOUS     Current Outpatient Medications   Medication Sig    budesonide (ENTOCORT EC) 3 mg capsule TAKE 3 CAP BY MOUTH DAILY. furosemide (Lasix) 40 mg tablet Take 1 Tablet by mouth daily. spironolactone (Aldactone) 100 mg tablet Take 1 Tablet by mouth daily. mycophenolate (CellCept) 500 mg tablet Take 1 Tablet by mouth two (2) times a day. glimepiride (AMARYL) 1 mg tablet Take 1 mg by mouth daily. pantoprazole (Protonix) 40 mg tablet Take 1 Tablet by mouth daily. hydrOXYchloroQUINE (PLAQUENIL) 200 mg tablet Take 200 mg by mouth daily. Allergies   Allergen Reactions    Budesonide Other (comments)     Patient states she is not allergic    Pcn [Penicillins] Rash       Visit Vitals  /83 (BP Patient Position: Supine)   Pulse (!) 106   Temp 97.2 °F (36.2 °C)   Resp 19   SpO2 97%     Physical Exam  Vitals and nursing note reviewed. Constitutional:       Appearance: Normal appearance. Cardiovascular:      Rate and Rhythm: Normal rate. Pulmonary:      Effort: Pulmonary effort is normal.   Neurological:      Mental Status: She is alert and oriented to person, place, and time. Cranial Nerves: Cranial nerves 2-12 are intact. Neurologic Exam     Mental Status   Oriented to person, place, and time. Cranial Nerves   Cranial nerves II through XII intact.      Motor Exam BUE 5/5  BLE R 2 L 3     Sensory Exam   Light touch normal.     Gait, Coordination, and Reflexes     Gait  Gait: (def)Absent DTrs          Lab Results   Component Value Date/Time    WBC 11.2 (H) 10/19/2022 02:40 AM    HGB 12.2 10/19/2022 02:40 AM    HCT 34.8 (L) 10/19/2022 02:40 AM    PLATELET 759 (L) 00/52/4157 02:40 AM    MCV 95.3 10/19/2022 02:40 AM     Lab Results   Component Value Date/Time    Hemoglobin A1c 8.2 (H) 10/19/2022 02:40 AM    Glucose 239 (H) 10/19/2022 02:40 AM    Glucose (POC) 106 10/19/2022 09:15 AM    Creatinine 0.84 10/19/2022 02:40 AM      No results found for: CHOL, CHOLPOCT, HDL, LDL, LDLC, LDLCPOC, LDLCEXT, TRIGL, TGLPOCT, CHHD, CHHDX  Lab Results   Component Value Date/Time    ALT (SGPT) 77 10/18/2022 12:14 PM    Alk. phosphatase 169 (H) 10/18/2022 12:14 PM    Bilirubin, direct 0.80 (H) 10/05/2022 01:43 PM    Bilirubin, total 1.5 (H) 10/18/2022 12:14 PM    Albumin 3.1 (L) 10/18/2022 12:14 PM    Protein, total 7.6 10/18/2022 12:14 PM    Ammonia, plasma 12 10/18/2022 05:28 PM    INR 1.0 10/05/2022 01:43 PM    Prothrombin time 10.9 10/05/2022 01:43 PM    PLATELET 594 (L) 74/25/2589 02:40 AM        CT Results (maximum last 3): No results found for this or any previous visit. MRI Results (maximum last 3): Results from Hospital Encounter encounter on 06/23/21    MRI ABD W MRCP WO CONT    Narrative  Protocol study includes MRCP    Gallbladder is out and there is mild dilatation of the common bile duct. Intrahepatic ducts are nondilated. There is no filling defect or distortion. Pancreatic duct is visualized along its length, nondilated and not interrupted. Liver shows a slightly nodular contour, without focal finding or clear fatty  infiltration, respiratory artifact noted on the in and out of phase series. Spleen is not enlarged. Normal adrenals and kidneys. Trace free fluid. Moderate  to large hiatal hernia    Impression  Dilatation of the common bile duct likely represents this patient's  postoperative baseline. Suspect early diffuse liver disease      VAS/US/Carotid Doppler Results (maximum last 3): No results found for this or any previous visit. PET Results (maximum last 3): No results found for this or any previous visit. Assessment and Plan        67-year-old woman presenting with painless progressive bilateral lower extremity weakness asymmetric. All of this she tells me began a week after her flu shot making me concerned for possible GBS. Typically with GBS the symptoms plateau at about 4 weeks which is where she is at now. Going to start with checking an MRI of the lumbosacral spine with contrast.  I need to make sure there is no acute issue going on with the spine given the falls and weakness (r/o mass, HNP,etc). After that she should get a lumbar puncture likely tomorrow. We might see evidence of inflammation in the spine so I have ordered a contrast study. I would like to get this study and LP before starting treatment with IVIG. She is already 4 weeks into this presentation and fortunately there are no cranial nerve concerns at this time such as shortness of breath or diplopia. She may have already reached the ca for this condition. PT and OT needed. This clinical note was dictated with an electronic dictation software that can make unintentional errors. If there are any questions, please contact me directly for clarification.       [address] [facility], DO  NEUROLOGIST  Diplomate CARMEN  10/19/2022

## 2022-10-19 NOTE — PROGRESS NOTES
[facility]  Hospitalist Group                                                                                          Hospitalist Progress Note  Herrera Kin[place], [place]  Answering service: [phone] from in house phone        Date of Service:  10/19/2022  NAME:  Charmayne Cosier  :  1945  MRN:  [id number]      Admission Summary:   Patient is a very pleasant 31-year-old  female with past medical history significant for autoimmune hepatitis with cirrhosis, history of systemic lupus erythematosus, chronic immunosuppression, secondary diabetes mellitus who happened to be in her usual state of health until 2 weeks ago, when she started feeling very weak and fatigued, especially weak in the lower extremity with difficulty standing up and using her walker, to a point where she can hardly walk. Because of the worsening symptoms, patient's family brought the patient to the emergency room for further evaluation. On arrival to the emergency room patient was noted to be afebrile with a temp of 98.4 along with significantly hypertension with a blood pressure 151/101 with respiratory rate of 18 and SPO2 of 96% on room air. Labs including CBC/BMP showed leukocytosis with white count of 15.2 while glucose was noted to be significantly elevated at 239 along with a BUN of 29 and creatinine of 0.84. Lactic acid was noted to be significantly elevated at 4.8. A chest x-ray was done which was consistent with minimal bibasilar opacities concerning for pneumonia. In the emergency room patient received, IV antibiotic including Rocephin and Zithromax and is being admitted to the hospitalist service for further work-up and management. Interval history / Subjective:   No acute interval events. Pt is resting comfortably in the bed, she endorses lower extremity weakness, but denies any recent falls or head trauma.  She reports worsening edema of the lower extremities, but denies any claudication, palpitations, chest pain or shortness of breath     Assessment & Plan:     Sepsis: Likely pneumonia  - Pt with leukocytosis, hypertension, afebrile  - CXR: Minimal bibasilar opacities may represent atelectasis and/or airspace disease  - Lactic acid 4.8, improved to 2.1 with IVF  - CX: Bcx: no growth at 7 hours, will follow  - ABX: Azithromycin, Ceftriaxone  - Budesonie     Lower Extremity weakness  - Pt reports worsening LW weakness over the past 2 weeks, with increasing difficulty in ambulation  - Neuro checks  - Neuro consulted, appreciate recs, c/f GBS  - MRI L-spine ordered  - Potentially need lumbar puncture tomorrow  - PT consulted  - B12, folate WNL  - TSH WNL    Lower extremity swelling R>L  - Pt reports worsening edema since Friday (10/14)  - Will consider LE doppler, but hold off for now as well's criteria 1.5pts, low risk for VTE    Systemic Lupus Erythematosus  - Continue home hydroxychloroquine  - Continue home mycophenolate     Diabetes mellitus  - Hold home glipizide  - Hgb A1C: 8.2  - SSI , Accuchecks     GERD  - Pantoprazole    Autoimmune hepatitis with ascities  - Lasix, spironolactone       Code status: Full  Prophylaxis: SCDs  Care Plan discussed with: Patient  Anticipated Disposition: TBD likely 48-72 hours     Hospital Problems  Date Reviewed: 10/16/2022            Codes Class Noted POA    Pneumonia ICD-10-CM: J18.9  ICD-9-CM: 652  10/18/2022 Unknown             Review of Systems:   A comprehensive review of systems was negative except for that written in the HPI. Vital Signs:    Last 24hrs VS reviewed since prior progress note.  Most recent are:  Visit Vitals  /76   Pulse 87   Temp 97.2 °F (36.2 °C)   Resp 15   SpO2 96%       No intake or output data in the 24 hours ending 10/19/22 9354     Physical Examination:     I had a face to face encounter with this patient and independently examined them on 10/19/2022 as outlined below:          Constitutional:  No acute distress, cooperative, pleasant    HENT:  Normocephalic, atraumatic, PERRL, EOMI, Oral mucosa moist, oropharynx benign. Resp:  CTA bilaterally. No wheezing/rhonchi/rales. No accessory muscle use. CV:  Regular rhythm, normal rate, no murmurs, gallops, rubs    GI:  Soft, non distended, non tender. normoactive bowel sounds, no hepatosplenomegaly     Musculoskeletal:  Trace edema in LLE, 1+ edema in RLE warm, 2+ pulses throughout    Neurologic:  Moves all extremities. B/L LE strength 3-4/5, RUE strength 4-5/5 B/L, sensory grossly intact            Data Review:    Review and/or order of clinical lab test  Review and/or order of tests in the radiology section of CPT  Review and/or order of tests in the medicine section of CPT      Labs:     Recent Labs     10/19/22  0240 10/18/22  1214   WBC 11.2* 15.0*   HGB 12.2 14.9   HCT 34.8* 42.9   * 158     Recent Labs     10/19/22  0240 10/18/22  1214   * 128*   K 4.5 4.8    96*   CO2 23 20*   BUN 29* 37*   CREA 0.84 1.28*   * 325*   CA 8.5 9.7   MG 1.7  --      Recent Labs     10/18/22  1214   ALT 77   *   TBILI 1.5*   TP 7.6   ALB 3.1*   GLOB 4.5*     No results for input(s): INR, PTP, APTT, INREXT in the last 72 hours. No results for input(s): FE, TIBC, PSAT, FERR in the last 72 hours. Lab Results   Component Value Date/Time    Folate 12.1 10/19/2022 02:40 AM      No results for input(s): PH, PCO2, PO2 in the last 72 hours. No results for input(s): CPK, CKNDX, TROIQ in the last 72 hours.     No lab exists for component: CPKMB  No results found for: CHOL, CHOLX, CHLST, CHOLV, HDL, HDLP, LDL, LDLC, DLDLP, TGLX, TRIGL, TRIGP, CHHD, CHHDX  Lab Results   Component Value Date/Time    Glucose (POC) 113 01/07/2022 09:55 AM    Glucose (POC) 145 (H) 12/08/2021 02:28 PM    Glucose (POC) 176 (H) 12/08/2021 08:19 AM    Glucose (POC) 99 11/09/2021 07:36 AM     Lab Results   Component Value Date/Time    Color YELLOW/STRAW 10/18/2022 03:13 PM Appearance CLEAR 10/18/2022 03:13 PM    Specific gravity 1.028 10/18/2022 03:13 PM    Specific gravity 1.025 09/03/2022 04:41 PM    pH (UA) 5.0 10/18/2022 03:13 PM    Protein Negative 10/18/2022 03:13 PM    Glucose >1,000 (A) 10/18/2022 03:13 PM    Ketone Negative 10/18/2022 03:13 PM    Bilirubin Negative 10/18/2022 03:13 PM    Urobilinogen 0.2 10/18/2022 03:13 PM    Nitrites Negative 10/18/2022 03:13 PM    Leukocyte Esterase Negative 10/18/2022 03:13 PM    Epithelial cells FEW 10/18/2022 03:13 PM    Bacteria Negative 10/18/2022 03:13 PM    WBC 0-4 10/18/2022 03:13 PM    RBC 5-10 10/18/2022 03:13 PM         Medications Reviewed:     Current Facility-Administered Medications   Medication Dose Route Frequency    glucose chewable tablet 16 g  4 Tablet Oral PRN    glucagon (GLUCAGEN) injection 1 mg  1 mg IntraMUSCular PRN    dextrose 10 % infusion 0-250 mL  0-250 mL IntraVENous PRN    insulin lispro (HUMALOG) injection   SubCUTAneous AC&HS    sodium chloride (NS) flush 5-40 mL  5-40 mL IntraVENous Q8H    sodium chloride (NS) flush 5-40 mL  5-40 mL IntraVENous PRN    acetaminophen (TYLENOL) tablet 650 mg  650 mg Oral Q6H PRN    Or    acetaminophen (TYLENOL) suppository 650 mg  650 mg Rectal Q6H PRN    polyethylene glycol (MIRALAX) packet 17 g  17 g Oral DAILY PRN    ondansetron (ZOFRAN ODT) tablet 4 mg  4 mg Oral Q8H PRN    Or    ondansetron (ZOFRAN) injection 4 mg  4 mg IntraVENous Q6H PRN    budesonide (ENTOCORT EC) capsule 9 mg  9 mg Oral DAILY    furosemide (LASIX) tablet 40 mg  40 mg Oral DAILY    hydrOXYchloroQUINE (PLAQUENIL) tablet 200 mg  200 mg Oral DAILY    mycophenolate mofetil (CELLCEPT) capsule 500 mg  500 mg Oral ACB&D    pantoprazole (PROTONIX) tablet 40 mg  40 mg Oral DAILY    spironolactone (ALDACTONE) tablet 100 mg  100 mg Oral DAILY    azithromycin (ZITHROMAX) 250 mg in 0.9% sodium chloride 250 mL IVPB  250 mg IntraVENous Q24H    cefTRIAXone (ROCEPHIN) 1 g in 0.9% sodium chloride 10 mL IV syringe  1 g IntraVENous Q24H    0.9% sodium chloride infusion  75 mL/hr IntraVENous CONTINUOUS     Current Outpatient Medications   Medication Sig    budesonide (ENTOCORT EC) 3 mg capsule TAKE 3 CAP BY MOUTH DAILY. furosemide (Lasix) 40 mg tablet Take 1 Tablet by mouth daily. spironolactone (Aldactone) 100 mg tablet Take 1 Tablet by mouth daily. mycophenolate (CellCept) 500 mg tablet Take 1 Tablet by mouth two (2) times a day. glimepiride (AMARYL) 1 mg tablet Take 1 mg by mouth daily. pantoprazole (Protonix) 40 mg tablet Take 1 Tablet by mouth daily. hydrOXYchloroQUINE (PLAQUENIL) 200 mg tablet Take 200 mg by mouth daily.      ______________________________________________________________________  EXPECTED LENGTH OF STAY: - - -  ACTUAL LENGTH OF STAY:          1                 Minor Lyons MilligramJEM

## 2022-10-19 NOTE — ED NOTES
Bedside and Verbal shift change report given to Annalee Hanks RN (oncoming nurse) by Radha Johnston RN (offgoing nurse). Report included the following information SBAR, ED Summary, MAR and Recent Results.

## 2022-10-19 NOTE — PROGRESS NOTES
Admission Medication Reconciliation  Nurse has confirmed with daughter that the patient is still prescribed hydroxychloroquine and should be taking it. I added this medication back to the PTA list.    Aide Hinson, PharmD      Spoke with patient and daughter at the bedside in ED-26. Patient is a poor historian- daughter reached out to her Father to provide details. RX query is available at this time. Attempting to contact Dr. Ness Hare via [address] text to update regarding completion of/changes to PTA med list, also reached out to Dr. Darrion Karimi to update. Notes:  Hydroxychloroquine was stopped by patient for reasons unknown- her rheum (Dr. Franky Us) is out of network so no notes available, daughter thought she was still taking it. Patient reported that she was still taking but Nadia Ch does not support that history and  found bottle from 4/29/22 90 days supply which still contained medication. Mycophenolate: Dr. Madelaine Joshi notes stated that he intended for her to increase dose to 1000 mg BID but patient has not done so, did not seem aware of this intended dosage increase  Discussed using pillbox with daughter, as well as other strategies to help keep patient's medication regimen on track    Medication changes (since last reviewed 10/17/22): Revised:  Glimepiride to twice daily per daughter    Removed:  Furosemide  Hydroxychloroquine      Thank you for allowing me to participate in the care of your patient. Hebert Pearce PharmD, RN # [phone]       [facility] pharmacy benefit data reflects medications filled and processed through the patient's insurance, however   this data does NOT capture whether the medication was picked up or is currently being taken by the patient.     Allergies:  Budesonide and Pcn [penicillins]    Significant PMH/Disease States:   Past Medical History:   Diagnosis Date    Autoimmune hepatitis ([address] 2021    GERD (gastroesophageal reflux disease)     Lupus ([facility] [address] lupus    PMB (postmenopausal bleeding) 08/2022    Uterine fibroid 07/03/2022     Chief Complaint for this Admission:    Chief Complaint   Patient presents with    Fatigue     Prior to Admission Medications:   Prior to Admission Medications   Prescriptions Last Dose Informant Taking?   budesonide (ENTOCORT EC) 3 mg capsule 10/19/2022  Yes   Sig: TAKE 3 CAP BY MOUTH DAILY. glimepiride (AMARYL) 1 mg tablet 10/19/2022 Self Yes   Sig: Take 1 mg by mouth two (2) times a day. mycophenolate (CellCept) 500 mg tablet 10/19/2022  Yes   Sig: Take 1 Tablet by mouth two (2) times a day. pantoprazole (Protonix) 40 mg tablet 10/19/2022  Yes   Sig: Take 1 Tablet by mouth daily. spironolactone (Aldactone) 100 mg tablet 10/19/2022  Yes   Sig: Take 1 Tablet by mouth daily. Facility-Administered Medications: None     Please contact the main inpatient pharmacy with any questions or concerns at [phone] and we will direct you to the clinical pharmacist covering this patient's care while in-house.    BIJAN Workman

## 2022-10-19 NOTE — ED NOTES
Has a final transfer review been performed?  Yes    Reason for Admission: SIRS, acute hepatitis, pneumonia   Patient comes from: home  Mental Status: Alert and oriented  ADL:partial assistance  Ambulation: wheelchair - pt was previously ambulating, then needed rollator and now and is now wheelchair bound; neuro suspects Tyree chandler   Pertinent Info/Safety Concerns: blood sugar checks   COVID Status: Not Indicated  MEWS Score: 1  Sinus Tachy  Vitals:    10/19/22 1012 10/19/22 1016 10/19/22 1322 10/19/22 1400   BP: 129/76 129/83  (!) 109/56   Pulse: (!) 123 (!) 106  94   Resp:    15   Temp:    98 °F (36.7 °C)   SpO2:    96%   Weight:   52.2 kg (115 lb)      Lines:   Peripheral IV 10/18/22 Left Antecubital (Active)   Site Assessment Clean, dry, & intact 10/18/22 1219   Phlebitis Assessment 0 10/18/22 1219   Infiltration Assessment 0 10/18/22 1219   Dressing Status Clean, dry, & intact 10/18/22 1219   Dressing Type Transparent 10/18/22 1219   Hub Color/Line Status Blue;Flushed;Patent 10/18/22 1219   Action Taken Blood drawn 10/18/22 1219      Transport mode:ED stretcher    Jonnie Call  being transferred to 5E(unit) for routine progression of care     \"Notification of etransfer note given to (Name) Harry Anders, [facility] (Title)

## 2022-10-19 NOTE — DIABETES MGMT
[facility]    CLINICAL NURSE SPECIALIST CONSULT     Initial Presentation   Lindsay Ricci is a 69 y.o. female who presented to the ED 10/18/22 with a 2wk c/o progressive fatigue. She has become wheelchair-bound and requiring significant assistance for transfers and performing ADLs. In the ED, labs were significant for: WBC 15, , , Alk phos 169, ALT 38, Lac 4.8. UA 1000+ glucosuria, neg ketones. CXR with questionable pneumonia. Blood cx were obtained and she was admitted for medical management. HX:   Past Medical History:   Diagnosis Date    Autoimmune hepatitis ([facility] [address] 2021    GERD (gastroesophageal reflux disease)     Lupus (HCC)     lupus    PMB (postmenopausal bleeding) 08/2022    Uterine fibroid 07/03/2022        INITIAL DX:   Pneumonia [J18.9]     Current Treatment     TX: Blood cx, IV antibitoics    Consulted by   Demarco Solis DO   for advanced diabetes nursing assessment and care for:   [x] Survival skill education    Hospital Course   Clinical progress has been uncomplicated. 10/18: Admission   Diabetes History   Type 2 Diabetes  Ambulatory BG management provided by: Julia Moody NP      Diabetes-related Medical History  Neurological complications  Peripheral neuropathy  Other associated conditions     Hyponatremia, Cirrhosis     Diabetes Medication History  Key Antihyperglycemic Medications               glimepiride (AMARYL) 1 mg tablet Take 1 mg by mouth daily. Patient tells me she takes 1 tablet of metformin and 2 tablets of amaryl daily. Daughter, Rochelle Garnica ([phone]) will confirm prescriptions and call me back to confirm.     Diabetes self-management practices:   Eating pattern   On a low salt diet  [x] Breakfast Oatmeal  [x] Lunch  [place]   [x] Dinner  Meatloaf, Air fried vegetables  [x] Bedtime Limited  [x] Snacks  Limited  [x] Beverages Water, coffee  Physical activity pattern   Weak   Now using a rollator  Monitoring pattern   Patient tells me she checks once daily: numbers 200-300s   Daughter says that she checks maybe once weekly Fasting is usually 100s and evening up to 300s     Taking medications pattern  [x] Consistent administration  [x] Affordable  Social determinants of health impacting diabetes self-management practices   Concerned that you need to know more about how to stay healthy with diabetes    Overall evaluation:    [x] Achieving A1c target with drug therapy & self-care practices    Subjective   I am hungry.      Objective   Physical exam  General  Normal weight white and frail female. Resting in bed. Appears exhausted. Conversant and cooperative  Neuro  Alert, oriented   Vital Signs Visit Vitals  /76   Pulse 87   Temp 97.2 °F (36.2 °C)   Resp 15   SpO2 96%     Skin  Warm and dry. No acanthosis noted along neckline. Heart   Regular rate and rhythm.  No murmurs, rubs or gallops  Lungs  Clear to auscultation without rales or rhonchi  Extremities No foot wounds      Laboratory  Recent Labs     10/19/22  0240 10/18/22  1214   * 325*   AGAP 6 12   WBC 11.2* 15.0*   CREA 0.84 1.28*   AST  --  38*   ALT  --  77       Factors impacting BG management  Factor Dose Comments   Nutrition:  Standard meals     60 grams/meal      Infection Pneumonia  IV antibiotics    Alcoholic hepatitis  Cirrhosis       Blood glucose pattern      Significant diabetes-related events over the past 24-72 hours  A1C 8.2%  Fasting B  Admission B  No insulin ordered  On cellcept for liver inflammation   9mg budesonide       Assessment and Plan   Nursing Diagnosis Risk for unstable blood glucose pattern   Nursing Intervention Domain 5259 Decision-making Support   Nursing Interventions Examined current inpatient diabetes/blood glucose control   Explored factors facilitating and impeding inpatient management  Explored corrective strategies with patient and responsible inpatient provider   Informed patient of rational for insulin strategy while hospitalized       Evaluation   Giancarlo Damon is a 69year old female, with Type 2 Diabetes and cirrhosis s/t autoimmune hepatitis, who is admitted with sepsis s/t community acquired pneumonia. A1C on admission was 8.2% (improved from 9.9%) with recent adjustments in oral antihyperglycemic agents a few weeks ago with her PCP. She does endorse stable fasting BG values PTA but did see hyperglycemia to the 200-300s in the evenings. Her BG was elevated on admission at 325 but now fasting 106 after IV hydration. Factors impacting glucose at this time are infection/PNA, cirrhosis and elevated A1C. Please continue to trend BG this admission and will start basal insulin if BG sustained over 180mg/dl. Recommendations   POC glucose ACHS    Consistent carbohydrate diet (60 grams CHO/meal)    3. If BG sustained over 180mg/dl, start the subcutaneous Insulin Order set (0342): Insulin Dosing Specific recommendation   Basal                                       (Based on weight, BMI & GFR) [x]        0.2 units/kg/D      If eating more than 50% of meals and pre-meal BG sustained over 180mg/dl, start Nutritional                                      (Based on CHO/dextrose load) [x] Normal sensitivity      Corrective                                       (Useful in adjusting insulin dosing) [x] Normal sensitivity ACHS starting at a BG of 200          Addendum 1327: Notified of elevated BG over 400. Will start insulin per recs above. Will give additional 10 units lispro x1 for correction per discussion with hospitalist PA. Billing Code(s)   [x] 33483   Before making these care recommendations, I personally reviewed the hospitalization record, including notes, laboratory & diagnostic data and current medications, and examined the patient at the bedside (circumstances permitting) before making care recommendations.  More than fifty (50) percent of the time was spent in patient counseling and/or care coordination.   Total minutes: 39      ALBERTO Feliz BC-ADM  Board Certified Advanced Diabetes Manager  Clinical Nurse Specialist  [facility]  Access via [address] [place] [address]

## 2022-10-19 NOTE — PROGRESS NOTES
Problem: Mobility Impaired (Adult and Pediatric)  Goal: *Acute Goals and Plan of Care (Insert Text)  Description: FUNCTIONAL STATUS PRIOR TO ADMISSION: Patient was modified independent using a rollator for functional mobility, h/o falls (weekly, collapses), ambulated full flight of steps to the shower. HOME SUPPORT PRIOR TO ADMISSION: The patient lived with her spouse, did not require assistance. Physical Therapy Goals  Initiated 10/19/2022  1. Patient will move from supine to sit and sit to supine  in bed with modified independence within 7 day(s). 2.  Patient will transfer from bed to chair and chair to bed with modified independence using the least restrictive device within 7 day(s). 3.  Patient will perform sit to stand with modified independence within 7 day(s). 4.  Patient will ambulate with modified independence for 50 feet with the least restrictive device within 7 day(s). Outcome: Not Met     PHYSICAL THERAPY EVALUATION  Patient: Woo Rinaldi (31 y.o. female)  Date: 10/19/2022  Primary Diagnosis: Pneumonia [J18.9]       Precautions:   Fall    ASSESSMENT  Based on the objective data described below, the patient presents with generalized weakness, impaired balance, and decreased activity tolerance s/p admission with pneumonia. Pt endorses a h/o falls (collapses w/o warning, about weekly) when ambulation with her rollator walker with the most recent fall occurring Thursday 10/13/22. At baseline pt is independent with a rollator walker, able to ambulate full flight of steps to the bathroom to take showers. Currently she is sponge bathing and sleeping in a bedroom on the first floor. BP was not orthostatic today and pt voiced no light headedness w/ position changes. Currently patient is generally Min A w/ functional mobility. She required assistance getting OOB and standing w/ a walker.   Gait with the walker is altered (wide based, shortened strides, slow, weak appearing) though steady. Pt fatigued after 5 ft. Patient is below her baseline. Physical therapy will follow. Recommend OT consult. Vitals:    10/19/22 0957 10/19/22 1006 10/19/22 1012 10/19/22 1016   BP: 124/79 138/83 129/76 129/83   BP 1 Location: Right upper arm      BP Patient Position: Supine Sitting Standing Supine   Pulse: (!) 101 (!) 120 (!) 123 (!) 106   Temp:       Resp: 18      SpO2: 97%          Of note, pt has her rollator walker with her in the ED. Placed two patient labels on her walker. Current Level of Function Impacting Discharge (mobility/balance): Min A supine<->sit; Up to Min A sit<->stand and ambulating with RW    Functional Outcome Measure: The patient scored 7/28 on the Tinetti outcome measure which is indicative of high fall risk. Other factors to consider for discharge: falls     Patient will benefit from skilled therapy intervention to address the above noted impairments. PLAN :  Recommendations and Planned Interventions: bed mobility training, transfer training, gait training, therapeutic exercises, patient and family training/education, and therapeutic activities      Frequency/Duration: Patient will be followed by physical therapy:  5 times a week to address goals. Recommendation for discharge: (in order for the patient to meet his/her long term goals)  To be determined: Anticipate home with HHPT pending progress. This discharge recommendation:  Has not yet been discussed the attending provider and/or case management    IF patient discharges home will need the following DME: patient owns DME required for discharge         SUBJECTIVE:   Patient stated It was too hot.   her coffee    OBJECTIVE DATA SUMMARY:   HISTORY:    Past Medical History:   Diagnosis Date    Autoimmune hepatitis ([facility] [address] 2021    GERD (gastroesophageal reflux disease)     Lupus (HCC)     lupus    PMB (postmenopausal bleeding) 08/2022    Uterine fibroid 07/03/2022     Past Surgical History:   Procedure Laterality Date    BIOPSY LIVER  10/2021    [facility]    HX CARPAL TUNNEL RELEASE      HX CHOLECYSTECTOMY      HX ORTHOPAEDIC      back     HX OTHER SURGICAL      gallbladder removal    IR BX LIVER PERCUTANEOUS  11/9/2021    PA BREAST SURGERY PROCEDURE UNLISTED Left     lumpectomy       Home Situation  Home Environment: Private residence  # Steps to Enter: 0  Wheelchair Ramp: Yes  One/Two Story Residence: Two story (shower upstairs; sleeping on he first floor)  Lift Chair Available: No  Living Alone: No (l/w spouse)  Support Systems: Spouse/Significant Other, Child(london)  Patient Expects to be Discharged to[de-identified] Home  Current DME Used/Available at Home: Elnoria Robert, rollator, Shower chair, Grab bars (removable shower head)  Tub or Shower Type: Tub/Shower combination    EXAMINATION/PRESENTATION/DECISION MAKING:   Critical Behavior:  Neurologic State: Alert  Orientation Level: Oriented to person, Oriented to place, Oriented to time, Oriented to situation  Cognition: Appropriate decision making, Appropriate for age attention/concentration, Appropriate safety awareness, Follows commands  Safety/Judgement: Awareness of environment    Range Of Motion:  AROM: Generally decreased, functional                       Strength:    Strength: Generally decreased, functional                    Tone & Sensation:   Tone: Normal                              Coordination:  Coordination: Within functional limits  Vision:   Wears bifocal glasses (does not have with her)  Functional Mobility:  Bed Mobility:     Supine to Sit: Minimum assistance;Assist x1;Additional time;Bed Modified; Adaptive equipment (HOB 45 degs, bed rail used)  Sit to Supine: Minimum assistance;Assist x1;Additional time  Tx: Verbal and tactile cues for hand position and technique  Transfers:  Sit to Stand: Minimum assistance;Assist x1;Additional time; Other (comment) (assist to stabilize walker from chair and bed)  Stand to Sit: Contact guard assistance;Assist x1  Tx: Verbal cues for hand placement. Balance:   Sitting: Impaired; Without support  Sitting - Static: Fair (occasional)  Sitting - Dynamic: Fair (occasional)  Standing: Impaired; Without support  Standing - Static: Fair;Constant support  Standing - Dynamic : Fair;Constant support  Ambulation/Gait Training:  Distance (ft): 5 Feet (ft)  Assistive Device: Gait belt;Walker, rolling  Ambulation - Level of Assistance: Minimal assistance;Assist x1;Additional time; Adaptive equipment (assist w/ walker management - note pt usually relies on 4 wheel walker - used 2 wheel this session)     Gait Description (WDL): Exceptions to WDL  Gait Abnormalities: Decreased step clearance; Step to gait        Base of Support: Widened     Speed/Autumn: Slow;Pace decreased (<100 feet/min)  Step Length: Right shortened;Left shortened               Functional Measure:  Tinetti test:    Sitting Balance: 0  Arises: 0  Attempts to Rise: 1  Immediate Standing Balance: 0  Standing Balance: 1  Nudged: 0  Eyes Closed: 0  Turn 360 Degrees - Continuous/Discontinuous: 0  Turn 360 Degrees - Steady/Unsteady: 0  Sitting Down: 1  Balance Score: 3 Balance total score  Indication of Gait: 0  R Step Length/Height: 0  L Step Length/Height: 0  R Foot Clearance: 1  L Foot Clearance: 1  Step Symmetry: 1  Step Continuity: 0  Path: 1  Trunk: 0  Walking Time: 0  Gait Score: 4 Gait total score  Total Score: 7/28 Overall total score         Tinetti Tool Score Risk of Falls  <19 = High Fall Risk  19-24 = Moderate Fall Risk  25-28 = Low Fall Risk  Tinetti ME. Performance-Oriented Assessment of Mobility Problems in Elderly Patients. [place] [address]; [id number].  (Scoring Description: PT Bulletin Feb. 10, 1993)    Older adults: Heyward Goodell et al, 2009; n = [address] elderly evaluated with ABC, TERRI, ADL, and IADL)  · Mean TERRI score for males aged 69-68 years = 26.21(3.40)  · Mean TERRI score for females age 69-68 years = 25.16(4.30)  · Mean TERRI score for males over 80 years = 23.29(6.02)  · Mean TERRI score for females over 80 years = 17.20(8.32)            Physical Therapy Evaluation Charge Determination   History Examination Presentation Decision-Making   MEDIUM  Complexity : 1-2 comorbidities / personal factors will impact the outcome/ POC  MEDIUM Complexity : 3 Standardized tests and measures addressing body structure, function, activity limitation and / or participation in recreation  LOW Complexity : Stable, uncomplicated  Other outcome measures Tinetti 7/28  HIGH       Based on the above components, the patient evaluation is determined to be of the following complexity level: LOW     Pain Rating:  None reported    Activity Tolerance:   Fair and requires rest breaks    After treatment patient left in no apparent distress:   Supine in bed, Call bell within reach, and Siderail x1 (ED plinth) to allow pt to have her coffee w/in reach (silver hosp tray). COMMUNICATION/EDUCATION:   The patients plan of care was discussed with: Registered nurse. Fall prevention education was provided and the patient/caregiver indicated understanding., Patient/family have participated as able in goal setting and plan of care. , and Patient/family agree to work toward stated goals and plan of care.     Thank you for this referral.  Diego Silver, PT   Time Calculation: 32 mins

## 2022-10-19 NOTE — CONSULTS
Neuro consult done [Time Spent: ___ minutes] : I have spent [unfilled] minutes of time on the encounter. [>50% of the face to face encounter time was spent on counseling and/or coordination of care for ___] : Greater than 50% of the face to face encounter time was spent on counseling and/or coordination of care for [unfilled]

## 2022-10-19 NOTE — PROGRESS NOTES
Bedside shift change report given to Nilson (oncoming nurse) by Latrell Culver RN (offgoing nurse). Report included the following information SBAR, Kardex, Intake/Output, MAR, and Recent Results.

## 2022-10-20 NOTE — PROGRESS NOTES
RUR: 14% Low    EYAD: Anticipated discharge home. PT/OT evals pending; awaiting disposition recommendations. Patient's daughter will likely provide transport home once medically stable. Follow-up with PCP/specialist.     Primary Contact: Daughter, Marcy De Paz, [phone] or , Edvin Nunes, [phone]    Medicare pt and daughter have received, reviewed, and signed 1st IM letter informing them of their right to appeal the discharge. Signed copied has been placed on pt bedside chart. *Will need 2nd IM letter prior to discharge. Care Management Interventions  PCP Verified by CM: Yes (Dr. Leigh Dover - last seen Sept. 2022)  Mode of Transport at Discharge:  Other (see comment) (Daughter)  Transition of Care Consult (CM Consult): Discharge Planning  Discharge Durable Medical Equipment: No  Physical Therapy Consult: Yes  Occupational Therapy Consult: No  Speech Therapy Consult: No  Support Systems: Spouse/Significant Other, Child(london)  Confirm Follow Up Transport: Family  The Plan for Transition of Care is Related to the Following Treatment Goals : Home  Discharge Location  Patient Expects to be Discharged to[de-identified] Home with family assistance    Reason for Admission:  Pneumonia                    RUR Score:   14% Low               Plan for utilizing home health:  TBD, PT/OT evals pending         PCP: First and Last name:  Jorge Vásquez NP     Name of Practice:    Are you a current patient: Yes/No: Yes   Approximate date of last visit: Sept. 2022   Can you participate in a virtual visit with your PCP: Yes                    Current Advanced Directive/Advance Care Plan: Full Code    Healthcare Decision Maker:   Click here to complete [place] including selection of the Healthcare Decision Maker Relationship (ie \"Primary\")             Primary Decision MakerJudy Clifton  [phone]    Secondary Decision Maker: Henok Logan - Daughter - [phone] Transition of Care Plan:         Home     CM met with patient and daughter at bedside to introduce self and explain role. Patient lives with her  in a 2 story home with ramp access. Patient has 12 steps to enter the 2nd floor; however, patient has 1st floor set-up with all needed amenities. Per daughter, she lives near by and has been checking in daily due to a consistent decline in patient's overall functioning. Patient was mostly independent with ADL's until as of past 1-2 weeks.  and daughter is responsible for IADL's. Patient was ambulating with the use of a FWW and also owns a rollator and WC. CM verified patient's PCP, demographics and insurance. Preferred pharmacy is [facility] in [place] with no barriers obtaining needed prescriptions. Patient's daughter will likely provide transport home once medically stable.     DARSHAN Myers   [phone]

## 2022-10-20 NOTE — PROGRESS NOTES
Problem: Self Care Deficits Care Plan (Adult)  Goal: *Acute Goals and Plan of Care (Insert Text)  Description: Occupational Therapy Goals  Initiated: 10/20/2022  1. Patient will perform grooming with supervision/set-up sitting in chair within 7 day(s). 2.  Patient will perform bathing with min  A from chair within 7 day(s). 3.  Patient will perform upper body dressing and lower body dressing with min A within 7 day(s). 4.  Patient will perform toilet transfers with CG A within 7 day(s). 5.  Patient will perform all aspects of toileting with CG A within 7 day(s). FUNCTIONAL STATUS PRIOR TO ADMISSION: She lives with her . She is typically independent but has declined in the last month significantly. She is not using a walker and w/c in the home. HOME SUPPORT: lives with . Outcome: Progressing Towards Goal     OCCUPATIONAL THERAPY EVALUATION  Patient: Jackelyn Ojeda (44 y.o. female)  Date: 10/20/2022  Primary Diagnosis: Pneumonia [J18.9]       Precautions:   Fall    ASSESSMENT  Based on the objective data described below, the patient presents with overall good performance sitting EOB for grooming and dressing activities. Pt was able to progress EOB for grooming and attempted standing. She reports her legs feel like rubber. Pt unable to progress to the [facility] due to fear of falling. Recommend attempting mobility with assist x 2 using RW as well to help improve her confidence and ability to move. At this time, she is well below her baseline and may require discharge to [facility]. Will continue to follow for continued intervention. Current Level of Function Impacting Discharge (ADLs/self-care): mod A to max A for LB ADL     Functional Outcome Measure: The patient scored Total: 50/100 on the Barthel Index outcome measure which is indicative of being 50% impairment in basic self-care.        Other factors to consider for discharge: debility,weakness     Patient will benefit from skilled therapy intervention to address the above noted impairments. PLAN :  Recommendations and Planned Interventions: self care training, functional mobility training, therapeutic exercise, balance training, therapeutic activities, endurance activities, patient education, home safety training, and family training/education    Frequency/Duration: Patient will be followed by occupational therapy 5 times a week to address goals. Recommendation for discharge: (in order for the patient to meet his/her long term goals)  Therapy 3 hours per day 5-7 days per week    This discharge recommendation:  Has been made in collaboration with the attending provider and/or case management    IF patient discharges home will need the following DME: TBD       SUBJECTIVE:   Patient stated I am just very tired.     OBJECTIVE DATA SUMMARY:   HISTORY:   Past Medical History:   Diagnosis Date    Autoimmune hepatitis ([facility] [address] 2021    GERD (gastroesophageal reflux disease)     Lupus (HCC)     lupus    PMB (postmenopausal bleeding) 08/2022    Uterine fibroid 07/03/2022     Past Surgical History:   Procedure Laterality Date    BIOPSY LIVER  10/2021    [facility]    HX CARPAL TUNNEL RELEASE      HX CHOLECYSTECTOMY      HX ORTHOPAEDIC      back     HX OTHER SURGICAL      gallbladder removal    IR BX LIVER PERCUTANEOUS  11/9/2021    [facility]ED[facility] UNLISTED Left     lumpectomy       Expanded or extensive additional review of patient history:     Home Situation  Home Environment: Private residence  # Steps to Enter: 0  Wheelchair Ramp: Yes  One/Two Story Residence: Two story  Lift Chair Available: No  Living Alone: No  Support Systems: Spouse/Significant Other  Patient Expects to be Discharged to[de-identified] Home with family assistance  Current DME Used/Available at Home: Liliane Cortez, rollator, Shower chair, Grab bars  Tub or Shower Type: Tub/Shower combination    Hand dominance: Right    EXAMINATION OF PERFORMANCE DEFICITS:  Cognitive/Behavioral Status:  Neurologic State: Alert  Orientation Level: Oriented X4  Cognition: Appropriate for age attention/concentration  Perception: Appears intact  Perseveration: No perseveration noted  Safety/Judgement: Good awareness of safety precautions    Skin: see nursing notes; being followed by wound care    Edema: none noted    Hearing: Auditory  Auditory Impairment: None    Vision/Perceptual:                           Acuity: Within Defined Limits         Range of Motion:    AROM: Generally decreased, functional  PROM: Within functional limits                      Strength:    Strength: Generally decreased, functional                Coordination:  Coordination: Generally decreased, functional  Fine Motor Skills-Upper: Right Intact; Left Intact    Gross Motor Skills-Upper: Right Intact; Left Intact    Tone & Sensation:    Tone: Normal  Sensation: Intact                      Balance:  Sitting: Impaired; Without support  Sitting - Static: Fair (occasional)  Sitting - Dynamic: Fair (occasional)  Standing: Impaired; Without support  Standing - Static: Fair;Constant support  Standing - Dynamic : Fair;Constant support    Functional Mobility and Transfers for ADLs:  Bed Mobility:  Supine to Sit: Minimum assistance  Sit to Supine: Minimum assistance    Transfers:  Sit to Stand: Moderate assistance  Stand to Sit: Moderate assistance  Bed to Chair: Moderate assistance  Bathroom Mobility: Moderate assistance  Toilet Transfer : Moderate assistance    ADL Assessment:  Feeding: Independent    Oral Facial Hygiene/Grooming: Setup    Bathing: Additional time; Moderate assistance    Type of Bath: Basin/Soap/Water;Partial    Upper Body Dressing: Supervision    Lower Body Dressing: Moderate assistance    Toileting: Moderate assistance                ADL Intervention and task modifications:     Sitting EOB for grooming and bathing.               Type of Bath: Basin/Soap/Water;Partial    Cognitive Retraining  Safety/Judgement: Good awareness of safety precautions    Functional Measure:    Barthel Index:  Bathin  Bladder: 10  Bowels: 10  Groomin  Dressin  Feeding: 10  Mobility: 0  Stairs: 0  Toilet Use: 5  Transfer (Bed to Chair and Back): 5  Total: 50/100      The Barthel ADL Index: Guidelines  1. The index should be used as a record of what a patient does, not as a record of what a patient could do. 2. The main aim is to establish degree of independence from any help, physical or verbal, however minor and for whatever reason. 3. The need for supervision renders the patient not independent. 4. A patient's performance should be established using the best available evidence. Asking the patient, friends/relatives and nurses are the usual sources, but direct observation and common sense are also important. However direct testing is not needed. 5. Usually the patient's performance over the preceding 24-48 hours is important, but occasionally longer periods will be relevant. 6. Middle categories imply that the patient supplies over 50 per cent of the effort. 7. Use of aids to be independent is allowed. Score Interpretation (from [address] [address])    Independent   60-79 Minimally independent   40-59 Partially dependent   20-39 Very dependent   <20 Totally dependent     -Sarath Barlow., Barthel, D.W. (1965). Functional evaluation: the Barthel Index. [facility] ([address] (1997). The Barthel activities of daily living index: self-reporting versus actual performance in the old (> or = 75 years). Journal of [address] [place] [address] 45(7), 14 [facility], J.J.M.F, Ianjason Overton., Curtis Banerjee. (). Measuring the change in disability after inpatient rehabilitation; comparison of the responsiveness of the Barthel Index and Functional [place] Measure. Journal of Neurology, Neurosurgery, and Psychiatry, 66(4), 572-398.   -GRETA Marrero, Alise clinton Burk M.A. (2004) Assessment of post-stroke quality of life in cost-effectiveness studies: The usefulness of the Barthel Index and the EuroQoL-5D. Quality of Life Research, 15, 353-22     Occupational Therapy Evaluation Charge Determination   History Examination Decision-Making   LOW Complexity : Brief history review  HIGH Complexity : 5 or more performance deficits relating to physical, cognitive , or psychosocial skils that result in activity limitations and / or participation restrictions HIGH Complexity : Patient presents with comorbidities that affect occupational performance. Signifigant modification of tasks or assistance (eg, physical or verbal) with assessment (s) is necessary to enable patient to complete evaluation       Based on the above components, the patient evaluation is determined to be of the following complexity level: LOW   Pain Rating:  No pain    Activity Tolerance:   Good    After treatment patient left in no apparent distress:    Supine in bed, Heels elevated for pressure relief, Call bell within reach, and Caregiver / family present    COMMUNICATION/EDUCATION:   The patients plan of care was discussed with: Physical therapist and Registered nurse. Home safety education was provided and the patient/caregiver indicated understanding. and Patient/family have participated as able in goal setting and plan of care. This patients plan of care is appropriate for delegation to [facility].     Thank you for this referral.  Layla Kan OT  Time Calculation: 45 mins

## 2022-10-20 NOTE — PROGRESS NOTES
Bedside shift change report given to Mukund Melvin (oncoming nurse) by Keyanna Llanos RN (offgoing nurse). Report included the following information SBAR, Kardex, Intake/Output, MAR, and Recent Results.

## 2022-10-20 NOTE — WOUND CARE
WOCN Note:     New consult placed for assessment of left inner buttock wound. Chart reviewed. Assessed in room 511b. Admitted DX:  Pneumonia    Assessment:   Patient is A&O x 4, communicative and requires assist with repositioning. Bed: versacare foam mattress  Patient has a Pure wick  Patient reports no pain. Patient repositioned on left side with pillow. Heels offloaded with pillows. Heels intact without erythema. Sacrum intact without erythema. Protected with foam dressing. 1. POA Left inner buttock, partial thickness wound from moisture/friction:  2 x 0.5 x 0.1 cm; 100% red; no exudate or odor. Cleansed and applied Zinc cream.    Wound, Pressure Prevention & Skin Care Recommendations:    Minimize layers of linen/pads under patient to optimize support surface. 2.  Turn/reposition approximately every 2 hours and offload heels. 3.  Manage moisture/ Keep skin folds clean and dry/minimize brief usage. 4.  Buttocks:  Every 8 hours and with incontinence care cleanse and apply Zinc cream.      Discussed above plan with patient and Claudell Casino RN.     Transition of Care:   Plan to follow as needed while admitted to hospital.    KENYON RogelN RN [address] Inpatient Wound Care  Available on [employer]  Office [phone]

## 2022-10-20 NOTE — DIABETES MGMT
[address] [facility]    CLINICAL NURSE SPECIALIST CONSULT     Initial Presentation   Rico Muhammad is a 69 y.o. female who presented to the ED 10/18/22 with a 2wk c/o progressive fatigue. She has become wheelchair-bound and requiring significant assistance for transfers and performing ADLs. In the ED, labs were significant for: WBC 15, , , Alk phos 169, ALT 38, Lac 4.8. UA 1000+ glucosuria, neg ketones. CXR with questionable pneumonia. Blood cx were obtained and she was admitted for medical management. HX:   Past Medical History:   Diagnosis Date    Autoimmune hepatitis ([facility] [address] 2021    GERD (gastroesophageal reflux disease)     Lupus (HCC)     lupus    PMB (postmenopausal bleeding) 08/2022    Uterine fibroid 07/03/2022        INITIAL DX:   Pneumonia [J18.9]     Current Treatment     TX: Blood cx, IV antibitoics    Consulted by   Demarco Solis DO   for advanced diabetes nursing assessment and care for:   [x] Survival skill education    Hospital Course   Clinical progress has been uncomplicated. 10/18: Admission  10/19: Seen by Neurology. GBS work-up   Diabetes History   Type 2 Diabetes  Ambulatory BG management provided by: Trae Valladares NP      Diabetes-related Medical History  Neurological complications  Peripheral neuropathy  Other associated conditions     Hyponatremia, Cirrhosis     Diabetes Medication History  Key Antihyperglycemic Medications               glimepiride (AMARYL) 1 mg tablet (Taking) Take 1 mg by mouth two (2) times a day. Patient tells me she takes 1 tablet of metformin and 2 tablets of amaryl daily. Daughter, Caty Cronin ([phone]) will confirm prescriptions and call me back to confirm.     Diabetes self-management practices:   Eating pattern   On a low salt diet  [x] Breakfast Oatmeal  [x] Lunch  [place]   [x] Dinner  Meatloaf, Air fried vegetables  [x] Bedtime Limited  [x] Snacks  Limited  [x] Beverages Water, coffee  Physical activity pattern   Weak   Now using a rollator  Monitoring pattern   Patient tells me she checks once daily: numbers 200-300s   Daughter says that she checks maybe once weekly Fasting is usually 100s and evening up to 300s     Taking medications pattern  [x] Consistent administration  [x] Affordable  Social determinants of health impacting diabetes self-management practices   Concerned that you need to know more about how to stay healthy with diabetes    Overall evaluation:    [x] Achieving A1c target with drug therapy & self-care practices      Started on oral budesonide 9mg with hepatology earlier this year by hepatologist for autoimmune hepatitis   Plan per last note: Once we have her on a stable high does of cellcept will start to taper the budesonide. And leave her on cellcept alone. Subjective   I am so tired.      Objective   Physical exam  General  Normal weight white and frail female. Resting in bed. Appears exhausted. Conversant and cooperative  Neuro  Alert, oriented   Vital Signs Visit Vitals  /76   Pulse 94   Temp 97.5 °F (36.4 °C)   Resp 14   Wt 52.2 kg (115 lb)   SpO2 95%   BMI 22.46 kg/m²     Skin  Warm and dry. No acanthosis noted along neckline. Heart   Regular rate and rhythm.  No murmurs, rubs or gallops  Lungs  Clear to auscultation without rales or rhonchi  Extremities No foot wounds      Laboratory  Recent Labs     10/20/22  0401 10/19/22  0240 10/18/22  1214   GLU 88 239* 325*   AGAP 4* 6 12   WBC 14.7* 11.2* 15.0*   CREA 0.83 0.84 1.28*   AST  --   --  38*   ALT  --   --  77         Factors impacting BG management  Factor Dose Comments   Nutrition:  Standard meals     60 grams/meal      Infection Pneumonia  IV antibiotics    Alcoholic hepatitis  Cirrhosis       Blood glucose pattern      Significant diabetes-related events over the past 24-72 hours  A1C 8.2%  Fasting B (88 on chem)  Pre-prandial B-393  Basal: 10 units Lantus daily  Bolus: 3 units with meals  Correction: 20 units in the last 24h  On cellcept for liver inflammation   9mg budesonide       Assessment and Plan   Nursing Diagnosis Risk for unstable blood glucose pattern   Nursing Intervention Domain 2571 Decision-making Support   Nursing Interventions Examined current inpatient diabetes/blood glucose control   Explored factors facilitating and impeding inpatient management  Explored corrective strategies with patient and responsible inpatient provider   Informed patient of rational for insulin strategy while hospitalized       Evaluation   Humble Fuentes is a 69year old female, with Type 2 Diabetes and cirrhosis s/t autoimmune hepatitis, who is admitted with sepsis s/t community acquired pneumonia. A1C on admission was 8.2% (improved from 9.9%) with recent adjustments in oral antihyperglycemic agents a few weeks ago with her PCP. She is prescribed 9mg budesonide- starting earlier this year for treatment of autoimmune hepatitis and elevations in M8S most certainly related to steroid use. She does endorse stable fasting BG values PTA but jacinto see hyperglycemia to the 200-300s in the evenings prior to hospital admission. Her BG was elevated on admission at 325 but now fasting 106 after IV hydration. Factors impacting glucose at this time are infection/PNA, budesonide, cirrhosis and elevated A1C. Yesterday, glucose iona to over 400 following budesonide and low dose glargine and mealtime humalog started. Please continue to trend BG this admission and modify basal insulin if BG sustained over 180mg/dl. Recommendations   POC glucose ACHS    Consistent carbohydrate diet (60 grams CHO/meal)    3. Continue the subcutaneous Insulin Order set (6991):   Insulin Dosing Specific recommendation   Basal                                       (Based on weight, BMI & GFR) [x]        0.2 units/kg/D      If eating more than 50% of meals and pre-meal BG sustained over 180mg/dl, start Nutritional (Based on CHO/dextrose load) [x] Normal sensitivity      Corrective                                       (Useful in adjusting insulin dosing) [x] Normal sensitivity ACHS starting at a BG of 200          If she experiences hyperglycemia today during the day, despite Lantus given prior to steroid dose, and fasting BG WNL: Switch to NPH: Would change to 15 units NPH with each budesonide dose. (0.3 units/kg with each budesonide dose)  Discharge Recommendations   Has a follow-up visit with endocrinologist: Berry Greer NP. [facility] 11/17/22 at 1pm. [phone] Fax. On Discharge, please place an outpatient order for \"diabetes education\" (enter as REF20). This will trigger a referral for the Program for Diabetes [facility] which includes outpatient diabetes self management training with a certified diabetes educator. Continue metformin 500mg daily. Stop amaryl     Likely will need basal insulin given with each budesonide dose. Dosing and recs TBD. Billing Code(s)   [x] 51842   Before making these care recommendations, I personally reviewed the hospitalization record, including notes, laboratory & diagnostic data and current medications, and examined the patient at the bedside (circumstances permitting) before making care recommendations. More than fifty (50) percent of the time was spent in patient counseling and/or care coordination.   Total minutes: 48      ALBERTO Dewitt BC-ADM  Board Certified Advanced Diabetes Manager  Clinical Nurse Specialist  [facility]  Access via [address] [place] [address]

## 2022-10-20 NOTE — PROGRESS NOTES
[address] [facility] Adult  Hospitalist Group                                                                                          Hospitalist Progress Note  Pepe Ardon MD  Answering service: [phone] from in house phone        Date of Service:  10/20/2022  NAME:  Modesto Hernandez  :  1945  MRN:  [id number]      Admission Summary:       52-year-old  female with past medical history significant for autoimmune hepatitis with cirrhosis, history of systemic lupus erythematosus, chronic immunosuppression, secondary diabetes mellitus who happened to be in her usual state of health until 2 weeks ago, when she started feeling very weak and fatigued, especially weak in the lower extremity with difficulty standing up and using her walker, to a point where she can hardly walk. Patient was admitted for Pneumonia    Interval history / Subjective:     Patient seen and examined, look chronic ill. Spoke to her daughter because she want me to start Plaquenil, medication that she stop taken. Assessment & Plan:      1. Sepsis due to Pneumonia. \       Patient on antibiotics, will re do the chest x-ray, initial one with poor element supporting a Pneumonia process ( my interpretation). Continue current antibiotic therapy. Neb as needed. .  2. Autoimmune Hepatitis. Liver Cirrhosis. Continue current management  3. SLE. Off medication for more than 3 month, no sign of activity now, most ikely associated with the immune suppression to other disease process. 4. Type 2 DM. Monitor. Continue medication. 5. GERD. On PPI, patient will benefit to be off PPI, general measures          Code status: Full code. Prophylaxis: SCD? Heparin  Care Plan discussed with: Patient and Nurse  Anticipated Disposition: TBD     Hospital Problems  Date Reviewed: 10/16/2022            Codes Class Noted POA    Pneumonia ICD-10-CM: J18.9  ICD-9-CM: 565  10/18/2022 Unknown             Review of Systems: Pertinent items are noted in HPI. Vital Signs:    Last 24hrs VS reviewed since prior progress note. Most recent are:  Visit Vitals  /76   Pulse 94   Temp 97.5 °F (36.4 °C)   Resp 14   Wt 52.2 kg (115 lb)   SpO2 95%   BMI 22.46 kg/m²         Intake/Output Summary (Last 24 hours) at 10/20/2022 1327  Last data filed at 10/20/2022 0740  Gross per 24 hour   Intake --   Output 600 ml   Net -600 ml        Physical Examination:     I had a face to face encounter with this patient and independently examined them on 10/20/2022 as outlined below:          Constitutional:  No acute distress, cooperative, pleasant    ENT:  Oral mucosa moist, oropharynx benign. Resp:  CTA bilaterally. No wheezing/rhonchi/rales. No accessory muscle use. CV:  Regular rhythm, normal rate, no murmurs, gallops, rubs    GI:  Soft, non distended, non tender. normoactive bowel sounds, no hepatosplenomegaly     Musculoskeletal:  No edema, warm, 2+ pulses throughout    Neurologic:  Moves all extremities. AAOx3, CN II-XII reviewed            Data Review:    Review and/or order of clinical lab test      Labs:     Recent Labs     10/20/22  0401 10/19/22  0240   WBC 14.7* 11.2*   HGB 12.3 12.2   HCT 35.7 34.8*   * 105*     Recent Labs     10/20/22  0401 10/19/22  0240 10/18/22  1214   * 134* 128*   K 4.7 4.5 4.8    105 96*   CO2 22 23 20*   BUN 28* 29* 37*   CREA 0.83 0.84 1.28*   GLU 88 239* 325*   CA 8.5 8.5 9.7   MG 1.5* 1.7  --    PHOS 2.2*  --   --      Recent Labs     10/18/22  1214   ALT 77   *   TBILI 1.5*   TP 7.6   ALB 3.1*   GLOB 4.5*     No results for input(s): INR, PTP, APTT, INREXT in the last 72 hours. No results for input(s): FE, TIBC, PSAT, FERR in the last 72 hours. Lab Results   Component Value Date/Time    Folate 12.1 10/19/2022 02:40 AM      No results for input(s): PH, PCO2, PO2 in the last 72 hours. No results for input(s): CPK, CKNDX, TROIQ in the last 72 hours.     No lab exists for component: CPKMB  No results found for: CHOL, CHOLX, CHLST, CHOLV, HDL, HDLP, LDL, LDLC, DLDLP, TGLX, TRIGL, TRIGP, CHHD, CHHDX  Lab Results   Component Value Date/Time    Glucose (POC) 191 (H) 10/20/2022 11:31 AM    Glucose (POC) 118 (H) 10/20/2022 06:58 AM    Glucose (POC) 249 (H) 10/19/2022 10:03 PM    Glucose (POC) 393 (H) 10/19/2022 04:56 PM    Glucose (POC) 457 (H) 10/19/2022 12:50 PM     Lab Results   Component Value Date/Time    Color YELLOW/STRAW 10/18/2022 03:13 PM    Appearance CLEAR 10/18/2022 03:13 PM    Specific gravity 1.028 10/18/2022 03:13 PM    Specific gravity 1.025 09/03/2022 04:41 PM    pH (UA) 5.0 10/18/2022 03:13 PM    Protein Negative 10/18/2022 03:13 PM    Glucose >1,000 (A) 10/18/2022 03:13 PM    Ketone Negative 10/18/2022 03:13 PM    Bilirubin Negative 10/18/2022 03:13 PM    Urobilinogen 0.2 10/18/2022 03:13 PM    Nitrites Negative 10/18/2022 03:13 PM    Leukocyte Esterase Negative 10/18/2022 03:13 PM    Epithelial cells FEW 10/18/2022 03:13 PM    Bacteria Negative 10/18/2022 03:13 PM    WBC 0-4 10/18/2022 03:13 PM    RBC 5-10 10/18/2022 03:13 PM         Medications Reviewed:     Current Facility-Administered Medications   Medication Dose Route Frequency    sodium bicarbonate (4.2%) injection 42 mg  1 mL SubCUTAneous RAD ONCE    lidocaine (PF) (XYLOCAINE) 20 mg/mL (2 %) injection 100 mg  5 mL SubCUTAneous RAD ONCE    glucose chewable tablet 16 g  4 Tablet Oral PRN    glucagon (GLUCAGEN) injection 1 mg  1 mg IntraMUSCular PRN    dextrose 10 % infusion 0-250 mL  0-250 mL IntraVENous PRN    insulin lispro (HUMALOG) injection   SubCUTAneous AC&HS    dextrose 10 % infusion 0-250 mL  0-250 mL IntraVENous PRN    insulin glargine (LANTUS) injection 10 Units  0.2 Units/kg SubCUTAneous DAILY    insulin lispro (HUMALOG) injection 3 Units  0.05 Units/kg SubCUTAneous TID WITH MEALS    traMADoL (ULTRAM) tablet 50 mg  50 mg Oral Q6H PRN    sodium chloride (NS) flush 5-40 mL  5-40 mL IntraVENous Q8H sodium chloride (NS) flush 5-40 mL  5-40 mL IntraVENous PRN    acetaminophen (TYLENOL) tablet 650 mg  650 mg Oral Q6H PRN    Or    acetaminophen (TYLENOL) suppository 650 mg  650 mg Rectal Q6H PRN    polyethylene glycol (MIRALAX) packet 17 g  17 g Oral DAILY PRN    ondansetron (ZOFRAN ODT) tablet 4 mg  4 mg Oral Q8H PRN    Or    ondansetron (ZOFRAN) injection 4 mg  4 mg IntraVENous Q6H PRN    budesonide (ENTOCORT EC) capsule 9 mg  9 mg Oral DAILY    mycophenolate mofetil (CELLCEPT) capsule 500 mg  500 mg Oral ACB&D    pantoprazole (PROTONIX) tablet 40 mg  40 mg Oral DAILY    spironolactone (ALDACTONE) tablet 100 mg  100 mg Oral DAILY    azithromycin (ZITHROMAX) 250 mg in 0.9% sodium chloride 250 mL IVPB  250 mg IntraVENous Q24H    cefTRIAXone (ROCEPHIN) 1 g in 0.9% sodium chloride 10 mL IV syringe  1 g IntraVENous Q24H    0.9% sodium chloride infusion  75 mL/hr IntraVENous CONTINUOUS     ______________________________________________________________________  EXPECTED LENGTH OF STAY: - - -  ACTUAL LENGTH OF STAY:          2                 Divya Hayes MD

## 2022-10-21 NOTE — PROGRESS NOTES
Problem: Self Care Deficits Care Plan (Adult)  Goal: *Acute Goals and Plan of Care (Insert Text)  Description: Occupational Therapy Goals  Initiated: 10/20/2022  1. Patient will perform grooming with supervision/set-up sitting in chair within 7 day(s). 2.  Patient will perform bathing with min  A from chair within 7 day(s). 3.  Patient will perform upper body dressing and lower body dressing with min A within 7 day(s). 4.  Patient will perform toilet transfers with CG A within 7 day(s). 5.  Patient will perform all aspects of toileting with CG A within 7 day(s). FUNCTIONAL STATUS PRIOR TO ADMISSION: She lives with her . She is typically independent but has declined in the last month significantly. She is not using a walker and w/c in the home. HOME SUPPORT: lives with . Outcome: Progressing Towards Goal     OCCUPATIONAL THERAPY TREATMENT  Patient: Nicole Hernadez (72 y.o. female)  Date: 10/21/2022  Diagnosis: Pneumonia [J18.9] <principal problem not specified>      Precautions: Fall  Chart, occupational therapy assessment, plan of care, and goals were reviewed. ASSESSMENT  Patient continues with skilled OT services and is progressing towards goals. Patient received in bed and progressed to the EOB for LB dressing activities. She completed dressing with mod A from the EOB. She stands with min A and cues. She progressed with mobility this afternoon with PT and OT using a w/c follow. She is making progress overall but does continue to be very limited by fatigued and poor endurance. Pt will benefit from discharge to [facility] once medically cleared. Per notes today, pt is to receive 5 days of IVIG due to GBS.       Current Level of Function Impacting Discharge (ADLs): mod A for dressing activities, min A x 2 for mobility for safety    Other factors to consider for discharge: debility, GBS         PLAN :  Patient continues to benefit from skilled intervention to address the above impairments. Continue treatment per established plan of care to address goals. Recommend with staff: OOB to chair TID for meals. Recommend progression to and from [facility] with use of walker and gait belt for toileting. Recommend next OT session: continue with established plan of care    Recommendation for discharge: (in order for the patient to meet his/her long term goals)  Therapy 3 hours per day 5-7 days per week    This discharge recommendation:  Has been made in collaboration with the attending provider and/or case management    IF patient discharges home will need the following DME: none       SUBJECTIVE:   Patient stated I am feeling alright. A little better today.     OBJECTIVE DATA SUMMARY:   Cognitive/Behavioral Status:  Neurologic State: Alert  Orientation Level: Oriented X4  Cognition: Appropriate for age attention/concentration  Perception: Appears intact  Perseveration: No perseveration noted  Safety/Judgement: Good awareness of safety precautions    Functional Mobility and Transfers for ADLs:  Bed Mobility:  Supine to Sit: Contact guard assistance; Adaptive equipment; Additional time;Bed Modified (HOB elevated/Use of bed rail/contact guard for initial sitting)  Sit to Supine:  (Left UP in chair)    Transfers:  Sit to Stand: Minimum assistance;Assist x1  Functional Transfers  Bathroom Mobility:  (cannot complete mobility to the bathroom but can use BSC)  Bed to Chair: Minimum assistance;Assist x1    Balance:  Sitting: Impaired; Without support  Sitting - Static: Good (unsupported)  Sitting - Dynamic: Fair (occasional)  Standing: Impaired; Without support  Standing - Static: Good;Constant support  Standing - Dynamic : Fair;Good;Constant support    ADL Intervention:                                Lower Body Dressing Assistance  Dressing Assistance:  Moderate assistance  Underpants: Minimum assistance  Pants With Elastic Waist: Minimum assistance    Toileting  Toileting Assistance: (demonstrates she can transfer to the [facility])    Cognitive Retraining  Safety/Judgement: Good awareness of safety precautions      Pain:  No pain    Activity Tolerance:   Good    After treatment patient left in no apparent distress:   Sitting in chair, Call bell within reach, and Caregiver / family present    COMMUNICATION/COLLABORATION:   The patients plan of care was discussed with: Physical therapist and Registered nurse.      Marilin Lubin OT  Time Calculation: 30 mins

## 2022-10-21 NOTE — PROGRESS NOTES
Problem: Mobility Impaired (Adult and Pediatric)  Goal: *Acute Goals and Plan of Care (Insert Text)  Description: FUNCTIONAL STATUS PRIOR TO ADMISSION: Patient was modified independent using a rollator for functional mobility, h/o falls (weekly, collapses), ambulated full flight of steps to the shower. HOME SUPPORT PRIOR TO ADMISSION: The patient lived with her spouse, did not require assistance. Physical Therapy Goals  Initiated 10/19/2022  1. Patient will move from supine to sit and sit to supine  in bed with modified independence within 7 day(s). 2.  Patient will transfer from bed to chair and chair to bed with modified independence using the least restrictive device within 7 day(s). 3.  Patient will perform sit to stand with modified independence within 7 day(s). 4.  Patient will ambulate with modified independence for 50 feet with the least restrictive device within 7 day(s). Outcome: Progressing Towards Goal   PHYSICAL THERAPY TREATMENT  Patient: Joselo Romero (44 y.o. female)  Date: 10/21/2022  Diagnosis: Pneumonia [J18.9] <principal problem not specified>      Precautions: Fall  Chart, physical therapy assessment, plan of care and goals were reviewed. ASSESSMENT  Patient continues with skilled PT services and is progressing towards goals. Patient is eager and motivated to \"get up and walk\". Performed bed mobility, sit-stand transfers, walked with RW and gait belt, wheelchair follow, then transfer to chair at bed side. Patient did well and wants to continue to improve toward modified independence. She is an excellent rehab candidate. Current Level of Function Impacting Discharge (mobility/balance): Contact guard, HOB elevated, use of be rail and additional time for supine to sit on edge of bed. Transferred with minimal assistance  and ambulated 40ft with RW and gait belt Steps were very small and shuffly. ...required cues for larger steps and floor clearance, states that feet still \"feel funny\". Tolerated activity very well and sat in chair for lunch. Other factors to consider for discharge: Motivated/A & O x 4/Supportive Family/Far from Modified Independent PLOF          PLAN :  Patient continues to benefit from skilled intervention to address the above impairments. Continue treatment per established plan of care. to address goals. Recommendation for discharge: (in order for the patient to meet his/her long term goals)  Therapy 3 hours per day 5-7 days per week    This discharge recommendation:  Has been made in collaboration with the attending provider and/or case management    IF patient discharges home will need the following DME: to be determined (TBD)       SUBJECTIVE:   Patient stated I want to walk!!!!.    OBJECTIVE DATA SUMMARY:   Critical Behavior:  Neurologic State: Alert  Orientation Level: Oriented X4  Cognition: Appropriate decision making, Appropriate for age attention/concentration, Appropriate safety awareness, Follows commands  Safety/Judgement: Good awareness of safety precautions  Functional Mobility Training:  Bed Mobility:     Supine to Sit: Contact guard assistance; Adaptive equipment; Additional time;Bed Modified (HOB elevated/Use of bed rail/contact guard for initial sitting)  Sit to Supine:  (Left UP in chair)           Transfers:  Sit to Stand: Minimum assistance;Assist x1  Stand to Sit: Contact guard assistance        Bed to Chair: Minimum assistance;Assist x1                    Balance:  Sitting: Impaired; Without support  Sitting - Static: Good (unsupported)  Sitting - Dynamic: Fair (occasional)  Standing: Impaired; Without support  Standing - Static: Good;Constant support  Standing - Dynamic : Fair;Good;Constant support  Ambulation/Gait Training:  Distance (ft): 40 Feet (ft)  Assistive Device: Walker, rolling;Gait belt  Ambulation - Level of Assistance: Minimal assistance;Assist x1;Adaptive equipment        Gait Abnormalities: Decreased step clearance; Step to gait        Base of Support: Widened     Speed/Autumn: Slow;Shuffled  Step Length: Right shortened;Left shortened (tiny steps, cues to correct)                Pain Rating:  None reported or observed    Activity Tolerance:   Fair    After treatment patient left in no apparent distress:   Sitting in chair, Call bell within reach, Bed / chair alarm activated, Caregiver / family present, and nurse notified and in room for blood sugar check    COMMUNICATION/COLLABORATION:   The patients plan of care was discussed with: Occupational therapist, Registered nurse, and Case management.      Cezar Major   Time Calculation: 30 mins

## 2022-10-21 NOTE — DIABETES MGMT
[facility]    CLINICAL NURSE SPECIALIST CONSULT     Initial Presentation   Jhonathan Rios is a 69 y.o. female who presented to the ED 10/18/22 with a 2wk c/o progressive fatigue. She has become wheelchair-bound and requiring significant assistance for transfers and performing ADLs. In the ED, labs were significant for: WBC 15, , , Alk phos 169, ALT 38, Lac 4.8. UA 1000+ glucosuria, neg ketones. CXR with questionable pneumonia. Blood cx were obtained and she was admitted for medical management. HX:   Past Medical History:   Diagnosis Date    Autoimmune hepatitis ([facility] [address] 2021    GERD (gastroesophageal reflux disease)     Lupus (HCC)     lupus    PMB (postmenopausal bleeding) 08/2022    Uterine fibroid 07/03/2022        INITIAL DX:   Pneumonia [J18.9]     Current Treatment     TX: Blood cx, IV antibitoics    Consulted by   Demarco Solis DO   for advanced diabetes nursing assessment and care for:   [x] Survival skill education    Hospital Course   Clinical progress has been uncomplicated. 10/18: Admission  10/19: Seen by Neurology. GBS work-up   Diabetes History   Type 2 Diabetes  Ambulatory BG management provided by: Ani Edwards NP      Diabetes-related Medical History  Neurological complications  Peripheral neuropathy  Other associated conditions     Hyponatremia, Cirrhosis     Diabetes Medication History  Key Antihyperglycemic Medications               glimepiride (AMARYL) 1 mg tablet (Taking) Take 1 mg by mouth two (2) times a day. Patient tells me she takes 1 tablet of metformin and 2 tablets of amaryl daily. Daughter, Ania ([phone]) will confirm prescriptions and call me back to confirm.     Diabetes self-management practices:   Eating pattern   On a low salt diet  [x] Breakfast Oatmeal  [x] Lunch  [place]   [x] Dinner  Meatloaf, Air fried vegetables  [x] Bedtime Limited  [x] Snacks  Limited  [x] Beverages Water, coffee  Physical activity pattern   Weak   Now using a rollator  Monitoring pattern   Patient tells me she checks once daily: numbers 200-300s   Daughter says that she checks maybe once weekly Fasting is usually 100s and evening up to 300s     Taking medications pattern  [x] Consistent administration  [x] Affordable  Social determinants of health impacting diabetes self-management practices   Concerned that you need to know more about how to stay healthy with diabetes    Overall evaluation:    [x] Achieving A1c target with drug therapy & self-care practices      Started on oral budesonide 9mg with hepatology earlier this year by hepatologist for autoimmune hepatitis   Plan per last note: Once we have her on a stable high does of cellcept will start to taper the budesonide. And leave her on cellcept alone. Subjective   Noted family at bedside and therapy working with patient at time of visit. Objective   Physical exam  General  Normal weight white and frail female. Resting in bed. Appears exhausted. Conversant and cooperative  Neuro  Alert, oriented   Vital Signs Visit Vitals  /79   Pulse 81   Temp 97.5 °F (36.4 °C)   Resp 16   Wt 52.2 kg (115 lb)   SpO2 99%   BMI 22.46 kg/m²     Skin  Warm and dry. No acanthosis noted along neckline. Heart   Regular rate and rhythm.  No murmurs, rubs or gallops  Lungs  Clear to auscultation without rales or rhonchi  Extremities No foot wounds      Laboratory  Recent Labs     10/21/22  0343 10/20/22  0401 10/19/22  0240 10/18/22  1214   * 88 239* 325*   AGAP 8 4* 6 12   WBC 11.3* 14.7* 11.2* 15.0*   CREA 0.62 0.83 0.84 1.28*   AST  --   --   --  38*   ALT  --   --   --  77         Factors impacting BG management  Factor Dose Comments   Nutrition:  Standard meals     60 grams/meal      Infection Pneumonia  IV antibiotics    Alcoholic hepatitis  Cirrhosis       Blood glucose pattern      Significant diabetes-related events over the past 24-72 hours  A1C 8.2%  Fasting B  10/20 Pre-prandial B-333  Basal: 20units Lantus daily  Bolus: 3 units with meals  Correction: 13 units in the last 24h  On cellcept for liver inflammation   9mg budesonide daily       Assessment and Plan   Nursing Diagnosis Risk for unstable blood glucose pattern   Nursing Intervention Domain 5258 Decision-making Support   Nursing Interventions Examined current inpatient diabetes/blood glucose control   Explored factors facilitating and impeding inpatient management  Explored corrective strategies with patient and responsible inpatient provider   Informed patient of rational for insulin strategy while hospitalized       Evaluation   Leo Salazar is a 69year old female, with Type 2 Diabetes and cirrhosis s/t autoimmune hepatitis, who is admitted with sepsis s/t community acquired pneumonia. A1C on admission was 8.2% (improved from 9.9%) with recent adjustments in oral antihyperglycemic agents a few weeks ago with her PCP. She is prescribed 9mg budesonide- starting earlier this year for treatment of autoimmune hepatitis and elevations in E7N most certainly related to steroid use. She does endorse stable fasting BG values PTA but jacinto see hyperglycemia to the 200-300s in the evenings prior to hospital admission. Her BG was elevated on admission at 325 but now fasting 106 after IV hydration. Factors impacting glucose at this time are infection/PNA, budesonide, cirrhosis and elevated A1C. Yesterday, glucose iona to over 400 following budesonide and low dose glargine and mealtime humalog started. Please continue to trend BG this admission and modify basal insulin if BG sustained over 180mg/dl. Recommendations   POC glucose ACHS    Consistent carbohydrate diet (60 grams CHO/meal)    3. Continue the subcutaneous Insulin Order set (2695):   Insulin Dosing Specific recommendation   Basal                                       (Based on weight, BMI & GFR) [x]        0.2 units/kg/D If eating more than 50% of meals and pre-meal BG sustained over 180mg/dl, start Nutritional                                      (Based on CHO/dextrose load) [x] Normal sensitivity      Corrective                                       (Useful in adjusting insulin dosing) [x] Normal sensitivity ACHS starting at a BG of 200          If she experiences hyperglycemia today during the day, despite Lantus given prior to steroid dose, and fasting BG WNL: Switch to NPH: Would change to 15 units NPH with each budesonide dose. (0.3 units/kg with each budesonide dose)  Discharge Recommendations   Has a follow-up visit with endocrinologist: Stephenie Paget, NP. [facility] 11/17/22 at 1pm. [phone] Fax. On Discharge, please place an outpatient order for \"diabetes education\" (enter as REF20). This will trigger a referral for the Program for Diabetes [facility] which includes outpatient diabetes self management training with a certified diabetes educator. Continue metformin 500mg daily. Stop amaryl     Likely will need basal insulin given with each budesonide dose. Dosing and recs TBD. Billing Code(s)   [x] 33041  Before making these care recommendations, I personally reviewed the hospitalization record, including notes, laboratory & diagnostic data and current medications, and examined the patient at the bedside (circumstances permitting) before making care recommendations. More than fifty (50) percent of the time was spent in patient counseling and/or care coordination.   Total minutes: 13      ALBERTO Hughes BC-ADM  Board Certified Advanced Diabetes Manager  Clinical Nurse Specialist  [facility]  Access via [address] [place] [address]

## 2022-10-21 NOTE — CONSULTS
Neurology Progress Note    Patient ID:  Jackelyn Ojeda  [id number]  69 y.o.  1945    Chief Complaint: Weakness    Subjective:     20-year-old woman who presented with subacute progressive loss of strength in her legs. All of this began a week after her flu shot. I completed an MRI of the lumbosacral spine to rule out any serious issues which was benign. She has some arthritis. CSF analysis done yesterday with highly elevated protein, negative for meningitis and no elevated white count. She still is densely weak in the legs. No upper extremity involvement no respiratory involvement.     Objective:       ROS:  Per HPI  All other 12 pt ROS negative    Meds:  Current Facility-Administered Medications   Medication Dose Route Frequency    insulin glargine (LANTUS) injection 20 Units  20 Units SubCUTAneous DAILY    melatonin tablet 6 mg  6 mg Oral QHS PRN    glucose chewable tablet 16 g  4 Tablet Oral PRN    glucagon (GLUCAGEN) injection 1 mg  1 mg IntraMUSCular PRN    dextrose 10 % infusion 0-250 mL  0-250 mL IntraVENous PRN    insulin lispro (HUMALOG) injection   SubCUTAneous AC&HS    dextrose 10 % infusion 0-250 mL  0-250 mL IntraVENous PRN    insulin lispro (HUMALOG) injection 3 Units  0.05 Units/kg SubCUTAneous TID WITH MEALS    traMADoL (ULTRAM) tablet 50 mg  50 mg Oral Q6H PRN    sodium chloride (NS) flush 5-40 mL  5-40 mL IntraVENous Q8H    sodium chloride (NS) flush 5-40 mL  5-40 mL IntraVENous PRN    acetaminophen (TYLENOL) tablet 650 mg  650 mg Oral Q6H PRN    Or    acetaminophen (TYLENOL) suppository 650 mg  650 mg Rectal Q6H PRN    polyethylene glycol (MIRALAX) packet 17 g  17 g Oral DAILY PRN    ondansetron (ZOFRAN ODT) tablet 4 mg  4 mg Oral Q8H PRN    Or    ondansetron (ZOFRAN) injection 4 mg  4 mg IntraVENous Q6H PRN    budesonide (ENTOCORT EC) capsule 9 mg  9 mg Oral DAILY    mycophenolate mofetil (CELLCEPT) capsule 500 mg  500 mg Oral ACB&D    pantoprazole (PROTONIX) tablet 40 mg  40 mg Oral DAILY    spironolactone (ALDACTONE) tablet 100 mg  100 mg Oral DAILY    azithromycin (ZITHROMAX) 250 mg in 0.9% sodium chloride 250 mL IVPB  250 mg IntraVENous Q24H    cefTRIAXone (ROCEPHIN) 1 g in 0.9% sodium chloride 10 mL IV syringe  1 g IntraVENous Q24H    0.9% sodium chloride infusion  75 mL/hr IntraVENous CONTINUOUS       MRI Results (maximum last 3): Results from [place] encounter on 10/18/22    MRI LUMB SPINE W WO CONT    Narrative  EXAM: MRI LUMB SPINE W WO CONT    INDICATION: BLE weakness, falls, GBS? 4 weeks. Need this before LP    COMPARISON: None    TECHNIQUE: MR imaging of the lumbar spine was performed using the following  sequences: sagittal T1, T2, STIR;  axial T1, T2 prior to and following contrast  administration. CONTRAST: 10 mL of ProHance. FINDINGS:  There is no fracture. There is 9 mm degenerative anterolisthesis at L4-5 . There is no acute marrow replacement. The conus medullaris terminates at T12. Signal and caliber of the distal spinal  cord are within normal limits. There is no pathologic intrathecal enhancement. The paraspinal soft tissues are unremarkable. Lower thoracic spine: T9-10 focal disc protrusion without significant canal  narrowing. L1-L2: No herniation or stenosis. L2-L3: Extruded HNP left superior. Focal central disc protrusion as well. Moderate spinal stenosis. Left foraminal stenosis. Patent right foramen. L3-L4: Mild spinal stenosis. Diffuse disc bulge. Facet arthrosis. Patent  foramina. L4-L5: Severe spinal stenosis. Prominent grade 1 anterolisthesis. Diffuse disc  bulge. Facet and interspinous arthrosis. Mild bilateral foraminal stenosis. L5-S1: Advanced disc degeneration with mild diffuse bulge and facet arthrosis  without significant canal stenosis. Mild bilateral foraminal stenosis. Impression  1. L2-3 disc extrusion with moderate spinal and left foraminal stenosis. 2. L4-5 severe spinal stenosis.       Results from Hospital Encounter encounter on 06/23/21    MRI ABD W MRCP WO CONT    Narrative  Protocol study includes MRCP    Gallbladder is out and there is mild dilatation of the common bile duct. Intrahepatic ducts are nondilated. There is no filling defect or distortion. Pancreatic duct is visualized along its length, nondilated and not interrupted. Liver shows a slightly nodular contour, without focal finding or clear fatty  infiltration, respiratory artifact noted on the in and out of phase series. Spleen is not enlarged. Normal adrenals and kidneys. Trace free fluid. Moderate  to large hiatal hernia    Impression  Dilatation of the common bile duct likely represents this patient's  postoperative baseline. Suspect early diffuse liver disease      Lab Review   Recent Results (from the past 24 hour(s))   GLUCOSE, POC    Collection Time: 10/20/22 11:31 AM   Result Value Ref Range    Glucose (POC) 191 (H) 65 - 117 mg/dL    Performed by Guillermo Ocampo  PCT    MENINGITIS PATHOGENS PANEL, CSF (BY PCR)    Collection Time: 10/20/22  2:49 PM   Result Value Ref Range    Escherichia coli K1 Not detected NOTD      Haemophilus Influenzae Not detected NOTD      Listeria Monocytogenes Not detected NOTD      Neisseria Meningitidis Not detected NOTD      Streptococcus Agalactiae Not detected NOTD      Streptococcus Pneumoniae Not detected NOTD      Cytomegalovirus Not detected NOTD      Enterovirus Not detected NOTD      Herpes Simplex Virus 1 Not detected NOTD      Herpes Simplex Virus 2 Not detected NOTD      Human Herpesvirus 6 Not detected NOTD      Human Parechovirus Not detected NOTD      Varicella Zoster Virus Not detected NOTD      Crypto.  neoformans/gattii Not detected NOTD     CELL COUNT, CSF    Collection Time: 10/20/22  2:49 PM   Result Value Ref Range    CSF TUBE NO. 1      CSF COLOR STRAW (A) COL      SPUN COLOR STRAW (A) COL      CSF APPEARANCE CLEAR CLEAR      CSF RBCs 49 (H) 0 /cu mm    CSF WBCs 0 0 - 5 /cu mm   GLUCOSE, CSF    Collection Time: 10/20/22  2:49 PM   Result Value Ref Range    Tube No. 1      Glucose, (H) 40 - 70 MG/DL   PROTEIN, CSF    Collection Time: 10/20/22  2:49 PM   Result Value Ref Range    Tube No. 1      Protein, (H) 15 - 45 MG/DL   CULTURE, CSF W GRAM STAIN    Collection Time: 10/20/22  2:49 PM    Specimen: Cerebrospinal Fluid   Result Value Ref Range    Special Requests: NO SPECIAL REQUESTS      GRAM STAIN NO WBC'S SEEN      GRAM STAIN NO ORGANISMS SEEN      Culture result: PENDING    GLUCOSE, POC    Collection Time: 10/20/22  5:46 PM   Result Value Ref Range    Glucose (POC) 333 (H) 65 - 117 mg/dL    Performed by [employer]  PCT    GLUCOSE, POC    Collection Time: 10/20/22  9:56 PM   Result Value Ref Range    Glucose (POC) 307 (H) 65 - 117 mg/dL    Performed by DEMARIO Mcdonald    CBC WITH AUTOMATED DIFF    Collection Time: 10/21/22  3:43 AM   Result Value Ref Range    WBC 11.3 (H) 3.6 - 11.0 K/uL    RBC 3.55 (L) 3.80 - 5.20 M/uL    HGB 11.8 11.5 - 16.0 g/dL    HCT 34.8 (L) 35.0 - 47.0 %    MCV 98.0 80.0 - 99.0 FL    MCH 33.2 26.0 - 34.0 PG    MCHC 33.9 30.0 - 36.5 g/dL    RDW 14.6 (H) 11.5 - 14.5 %    PLATELET 94 (L) 824 - 400 K/uL    MPV 9.9 8.9 - 12.9 FL    NRBC 0.0 0  WBC    ABSOLUTE NRBC 0.00 0.00 - 0.01 K/uL    NEUTROPHILS 90 (H) 32 - 75 %    LYMPHOCYTES 5 (L) 12 - 49 %    MONOCYTES 3 (L) 5 - 13 %    EOSINOPHILS 0 0 - 7 %    BASOPHILS 0 0 - 1 %    METAMYELOCYTES 1 (H) 0 %    MYELOCYTES 1 (H) 0 %    IMMATURE GRANULOCYTES 0 %    ABS. NEUTROPHILS 10.2 (H) 1.8 - 8.0 K/UL    ABS. LYMPHOCYTES 0.6 (L) 0.8 - 3.5 K/UL    ABS. MONOCYTES 0.3 0.0 - 1.0 K/UL    ABS. EOSINOPHILS 0.0 0.0 - 0.4 K/UL    ABS. BASOPHILS 0.0 0.0 - 0.1 K/UL    ABS. IMM.  GRANS. 0.0 K/UL    DF MANUAL      RBC COMMENTS MACROCYTOSIS  1+       METABOLIC PANEL, BASIC    Collection Time: 10/21/22  3:43 AM   Result Value Ref Range    Sodium 130 (L) 136 - 145 mmol/L    Potassium 5.0 3.5 - 5.1 mmol/L Chloride 104 97 - 108 mmol/L    CO2 18 (L) 21 - 32 mmol/L    Anion gap 8 5 - 15 mmol/L    Glucose 201 (H) 65 - 100 mg/dL    BUN 24 (H) 6 - 20 MG/DL    Creatinine 0.62 0.55 - 1.02 MG/DL    BUN/Creatinine ratio 39 (H) 12 - 20      eGFR >60 >60 ml/min/1.73m2    Calcium 8.5 8.5 - 10.1 MG/DL   MAGNESIUM    Collection Time: 10/21/22  3:43 AM   Result Value Ref Range    Magnesium 1.7 1.6 - 2.4 mg/dL   PHOSPHORUS    Collection Time: 10/21/22  3:43 AM   Result Value Ref Range    Phosphorus 2.4 (L) 2.6 - 4.7 MG/DL   GLUCOSE, POC    Collection Time: 10/21/22  6:38 AM   Result Value Ref Range    Glucose (POC) 265 (H) 65 - 117 mg/dL    Performed by Grzegorz Dominguez        Additional comments:I reviewed the patient's new clinical lab test results. and I reviewed the patients new imaging test results. Patient Vitals for the past 8 hrs:   BP Temp Pulse Resp SpO2   10/21/22 0806 119/76 97.7 °F (36.5 °C) 83 16 98 %   10/21/22 0200 -- -- 92 -- --   10/21/22 0132 126/69 98.8 °F (37.1 °C) 76 18 98 %       No intake/output data recorded.   10/19 1901 - 10/21 0700  In: -   Out: 1400 [Urine:1400]    Exam:  Visit Vitals  /76   Pulse 83   Temp 97.7 °F (36.5 °C)   Resp 16   Wt 115 lb (52.2 kg)   SpO2 98%   BMI 22.46 kg/m²     Gen: Well developed  CV: RRR  Lungs: non labored breathing  Abd: soft, non distended  Neuro: A&O x 3, no dysarthria or aphasia  CN II-XII: PERRL, EOMI, face symmetric, tongue/palate midline  Motor: BLE weakness L>R 3 and 3+  Sensory: intact to LT  Trace DTrs  Gait: def    PROBLEM LIST:     Patient Active Problem List   Diagnosis Code    Autoimmune hepatitis (HCC) K75.4    Type 2 diabetes mellitus (HCC) E11.9    GERD (gastroesophageal reflux disease) K21.9    Lupus (HCC) M32.9    History of cholecystectomy Z90.49    H/O lumbosacral spine surgery Z98.890    Cirrhosis (HCC) K74.60    Uterine fibroid D25.9    Abnormal uterine bleeding (AUB) N93.9    Recent unexplained weight loss R63.4    Ascites R18.8    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 Her CSF analysis is suggestive of likely GBS. No evidence of infection or inflammation. Neuroimaging benign. I am going to start her on IVIG for 5 days. I realize she is already about 4 weeks post onset and may have nadired however I do think it is reasonable to give her a course of treatment to see if it can help with her recovery as her current condition has caused severe impairment. I discussed this with her at the bedside. She understands. We will follow-up. During this evaluation, we also discussed stroke education to include signs and symptoms of stroke and TIA. This clinical note was dictated with an electronic dictation software that can make unintentional errors. If there are any questions, please contact me directly for clarification.       Signed:  Jamaal Dixon DO  10/21/2022  9:16 AM

## 2022-10-21 NOTE — PROGRESS NOTES
[address]ist Group                                                                                          Hospitalist Progress Note  Kianna Ponce MD  Answering service: [phone] from in house phone        Date of Service:  10/21/2022  NAME:  Marvel Granda  :  1945  MRN:  [id number]      Admission Summary:    52-year-old  female with past medical history significant for autoimmune hepatitis with cirrhosis, history of systemic lupus erythematosus, chronic immunosuppression, secondary diabetes mellitus who happened to be in her usual state of health until 2 weeks ago, when she started feeling very weak and fatigued, especially weak in the lower extremity with difficulty standing up and using her walker, to a point where she can hardly walk. Patient was admitted for Pneumonia       Interval history / Subjective:     Patient seen and examined, patient tells me that she wants to go to in patient rehabilitation      Assessment & Plan:      1. Sepsis due to Pneumonia. \     Patient look much better today, she is a room air tolerating well. Neb as needed. .  2. Autoimmune Hepatitis. Liver Cirrhosis. Continue current management  3. SLE. Off medication for more than 3 month, no sign of activity now, most ikely associated with the immune suppression to other disease process. 4. Type 2 DM. Monitor. Continue medication. Lantus increase to 20 units. Monitor and adjust medication as needed  5. GERD. On PPI, patient will benefit to be off PPI, general measures                Code status:  Full  Prophylaxis: Heparin  Care Plan discussed with: patient/CM  Anticipated Disposition: TBD     Hospital Problems  Date Reviewed: 10/16/2022            Codes Class Noted POA    Pneumonia ICD-10-CM: J18.9  ICD-9-CM: 428  10/18/2022 Unknown             Review of Systems:   A comprehensive review of systems was negative except for that written in the HPI.        Vital Signs:    Last 24hrs VS reviewed since prior progress note. Most recent are:  Visit Vitals  /79   Pulse 81   Temp 97.5 °F (36.4 °C)   Resp 16   Wt 52.2 kg (115 lb)   SpO2 99%   BMI 22.46 kg/m²         Intake/Output Summary (Last 24 hours) at 10/21/2022 1424  Last data filed at 10/21/2022 1027  Gross per 24 hour   Intake 240 ml   Output 800 ml   Net -560 ml        Physical Examination:     I had a face to face encounter with this patient and independently examined them on 10/21/2022 as outlined below:          Constitutional:  No acute distress, cooperative, pleasant    ENT:  Oral mucosa moist, oropharynx benign. Resp:  CTA bilaterally. No wheezing/rhonchi/rales. No accessory muscle use. CV:  Regular rhythm, normal rate, no murmurs, gallops, rubs    GI:  Soft, non distended, non tender. normoactive bowel sounds, no hepatosplenomegaly     Musculoskeletal:  No edema, warm, 2+ pulses throughout    Neurologic:  Moves all extremities. AAOx3, CN II-XII reviewed            Data Review:    Review and/or order of clinical lab test      Labs:     Recent Labs     10/21/22  0343 10/20/22  0401   WBC 11.3* 14.7*   HGB 11.8 12.3   HCT 34.8* 35.7   PLT 94* 101*     Recent Labs     10/21/22  0343 10/20/22  0401 10/19/22  0240   * 130* 134*   K 5.0 4.7 4.5    104 105   CO2 18* 22 23   BUN 24* 28* 29*   CREA 0.62 0.83 0.84   * 88 239*   CA 8.5 8.5 8.5   MG 1.7 1.5* 1.7   PHOS 2.4* 2.2*  --      No results for input(s): ALT, AP, TBIL, TBILI, TP, ALB, GLOB, GGT, AML, LPSE in the last 72 hours. No lab exists for component: SGOT, GPT, AMYP, HLPSE  No results for input(s): INR, PTP, APTT, INREXT in the last 72 hours. No results for input(s): FE, TIBC, PSAT, FERR in the last 72 hours. Lab Results   Component Value Date/Time    Folate 12.1 10/19/2022 02:40 AM      No results for input(s): PH, PCO2, PO2 in the last 72 hours. No results for input(s): CPK, CKNDX, TROIQ in the last 72 hours.     No lab exists for component: CPKMB  No results found for: CHOL, CHOLX, CHLST, CHOLV, HDL, HDLP, LDL, LDLC, DLDLP, TGLX, TRIGL, TRIGP, CHHD, CHHDX  Lab Results   Component Value Date/Time    Glucose (POC) 187 (H) 10/21/2022 12:01 PM    Glucose (POC) 265 (H) 10/21/2022 06:38 AM    Glucose (POC) 307 (H) 10/20/2022 09:56 PM    Glucose (POC) 333 (H) 10/20/2022 05:46 PM    Glucose (POC) 191 (H) 10/20/2022 11:31 AM     Lab Results   Component Value Date/Time    Color YELLOW/STRAW 10/18/2022 03:13 PM    Appearance CLEAR 10/18/2022 03:13 PM    Specific gravity 1.028 10/18/2022 03:13 PM    Specific gravity 1.025 09/03/2022 04:41 PM    pH (UA) 5.0 10/18/2022 03:13 PM    Protein Negative 10/18/2022 03:13 PM    Glucose >1,000 (A) 10/18/2022 03:13 PM    Ketone Negative 10/18/2022 03:13 PM    Bilirubin Negative 10/18/2022 03:13 PM    Urobilinogen 0.2 10/18/2022 03:13 PM    Nitrites Negative 10/18/2022 03:13 PM    Leukocyte Esterase Negative 10/18/2022 03:13 PM    Epithelial cells FEW 10/18/2022 03:13 PM    Bacteria Negative 10/18/2022 03:13 PM    WBC 0-4 10/18/2022 03:13 PM    RBC 5-10 10/18/2022 03:13 PM         Medications Reviewed:     Current Facility-Administered Medications   Medication Dose Route Frequency    insulin glargine (LANTUS) injection 20 Units  20 Units SubCUTAneous DAILY    melatonin tablet 6 mg  6 mg Oral QHS PRN    glucose chewable tablet 16 g  4 Tablet Oral PRN    glucagon (GLUCAGEN) injection 1 mg  1 mg IntraMUSCular PRN    dextrose 10 % infusion 0-250 mL  0-250 mL IntraVENous PRN    insulin lispro (HUMALOG) injection   SubCUTAneous AC&HS    dextrose 10 % infusion 0-250 mL  0-250 mL IntraVENous PRN    insulin lispro (HUMALOG) injection 3 Units  0.05 Units/kg SubCUTAneous TID WITH MEALS    traMADoL (ULTRAM) tablet 50 mg  50 mg Oral Q6H PRN    sodium chloride (NS) flush 5-40 mL  5-40 mL IntraVENous Q8H    sodium chloride (NS) flush 5-40 mL  5-40 mL IntraVENous PRN    acetaminophen (TYLENOL) tablet 650 mg  650 mg Oral Q6H PRN    Or    acetaminophen (TYLENOL) suppository 650 mg  650 mg Rectal Q6H PRN    polyethylene glycol (MIRALAX) packet 17 g  17 g Oral DAILY PRN    ondansetron (ZOFRAN ODT) tablet 4 mg  4 mg Oral Q8H PRN    Or    ondansetron (ZOFRAN) injection 4 mg  4 mg IntraVENous Q6H PRN    budesonide (ENTOCORT EC) capsule 9 mg  9 mg Oral DAILY    mycophenolate mofetil (CELLCEPT) capsule 500 mg  500 mg Oral ACB&D    pantoprazole (PROTONIX) tablet 40 mg  40 mg Oral DAILY    spironolactone (ALDACTONE) tablet 100 mg  100 mg Oral DAILY    azithromycin (ZITHROMAX) 250 mg in 0.9% sodium chloride 250 mL IVPB  250 mg IntraVENous Q24H    cefTRIAXone (ROCEPHIN) 1 g in 0.9% sodium chloride 10 mL IV syringe  1 g IntraVENous Q24H    0.9% sodium chloride infusion  75 mL/hr IntraVENous CONTINUOUS     ______________________________________________________________________  EXPECTED LENGTH OF STAY: 4d 19h  ACTUAL LENGTH OF STAY:          3                 Joyce Mcdonald MD

## 2022-10-22 NOTE — PROGRESS NOTES
Neurology Progress Note     NAME: Tommie Iniguez   :  1945   MRN:  [id number]   DATE:  10/22/2022    Assessment:     Active Problems:    Pneumonia (10/18/2022)      Pt is a 75yo RH female with DM, autoimmune hepatitis with cirrhosis, SLE on chronic immunosuppression with cellcept, admitted 10/18/22 due to severe weakness in LE preventing her from walking. Pt reports onset 4 weeks ago after flu vaccine, markedly worsened 2 weeks ago. She was found on admission to a leukocytosis and lactic acidosis, CXR with minimal bibasilar opacities concerning for PNA, started on Zithromax and Rocephin. She underwent MRI L-spine w/wo contrast 10/19/22 without mention of nerve root enhancement, does have degenerative changes with mod to severe central stenosis. LP 10/20/22 with cytoalbuminologic dissociation with Pro 107. Exam by Dr Glynn Michelle on 10/19/22 with right LE weakness 2/5 and left 3/5. This is my first visit with the pt. Exam with mild dyspnea, intact UE reflexes and 5/5 strength, has absent reflexes in LE, 4/5 left HF, 3/5 right HF, 4/5 DF bilaterally. Suspected GBS based on history of progressive LE weakness after flu vaccine, absent LE reflexes (though does have DM), and elevated CSF protein. No inpt NCS/EMG can be obtained at this time. She has been hemodynamically stable. Plan:   -IgA level ordered  -Pt is just at cut off for IVIG, will start IVIG 0.4g/kg daily x 5 days, d/w pt. -PT    Subjective:   Pt doing well. Has SOB, but being treated for PNA. Denies speech or swallowing difficulties. No UE weakness.      Objective:   Chart reviewed since last seen    Current Facility-Administered Medications   Medication Dose Route Frequency    insulin glargine (LANTUS) injection 20 Units  20 Units SubCUTAneous DAILY    melatonin tablet 6 mg  6 mg Oral QHS PRN glucose chewable tablet 16 g  4 Tablet Oral PRN    glucagon (GLUCAGEN) injection 1 mg  1 mg IntraMUSCular PRN    dextrose 10 % infusion 0-250 mL  0-250 mL IntraVENous PRN    insulin lispro (HUMALOG) injection   SubCUTAneous AC&HS    dextrose 10 % infusion 0-250 mL  0-250 mL IntraVENous PRN    insulin lispro (HUMALOG) injection 3 Units  0.05 Units/kg SubCUTAneous TID WITH MEALS    traMADoL (ULTRAM) tablet 50 mg  50 mg Oral Q6H PRN    sodium chloride (NS) flush 5-40 mL  5-40 mL IntraVENous Q8H    sodium chloride (NS) flush 5-40 mL  5-40 mL IntraVENous PRN    acetaminophen (TYLENOL) tablet 650 mg  650 mg Oral Q6H PRN    Or    acetaminophen (TYLENOL) suppository 650 mg  650 mg Rectal Q6H PRN    polyethylene glycol (MIRALAX) packet 17 g  17 g Oral DAILY PRN    ondansetron (ZOFRAN ODT) tablet 4 mg  4 mg Oral Q8H PRN    Or    ondansetron (ZOFRAN) injection 4 mg  4 mg IntraVENous Q6H PRN    budesonide (ENTOCORT EC) capsule 9 mg  9 mg Oral DAILY    mycophenolate mofetil (CELLCEPT) capsule 500 mg  500 mg Oral ACB&D    pantoprazole (PROTONIX) tablet 40 mg  40 mg Oral DAILY    spironolactone (ALDACTONE) tablet 100 mg  100 mg Oral DAILY    cefTRIAXone (ROCEPHIN) 1 g in 0.9% sodium chloride 10 mL IV syringe  1 g IntraVENous Q24H    0.9% sodium chloride infusion  100 mL/hr IntraVENous CONTINUOUS       Visit Vitals  /80   Pulse 74   Temp 98.1 °F (36.7 °C)   Resp 16   Wt 115 lb (52.2 kg)   SpO2 98%   BMI 22.46 kg/m²     Temp (24hrs), Av.7 °F (36.5 °C), Min:97.4 °F (36.3 °C), Max:98.1 °F (36.7 °C)      10/22 0701 - 10/22 1900  In: -   Out: 900 [Urine:900]  10/20 1901 - 10/22 07  In: 660 [P.O.:660]  Out: 1300 [Urine:1300]      Physical Exam:  General: Well developed well nourished patient in no apparent distress. Cardiac: Regular rate and rhythm with no murmurs.    Resp: Mild dyspnea during history/exam  Extremities: 2+ Radial pulses, no cyanosis or edema    Neurological Exam:  Mental Status: Oriented to time, place and person. Speech and language intact. Attention and fund of knowledge appropriate. Normal recent and remote memory. Cranial Nerves:   VFF, EOMI, no nystagmus, no ptosis. Facial movement is symmetric. Hearing is intact. Motor:  5/5 strength in upper extremities. Right HF 3/5, left HF 4/5, bilateral DF 4/5. Normal bulk and tone. No PD. No tremors   Reflexes:   Deep tendon reflexes 2+ and symmetric in UE, absent in LE. Toes downgoing. Sensory:   Intact to LT and PP   Gait:  Unable to assess due to patient factors   Cerebellar:           Lab Review   Recent Results (from the past 24 hour(s))   GLUCOSE, POC    Collection Time: 10/21/22 12:01 PM   Result Value Ref Range    Glucose (POC) 187 (H) 65 - 117 mg/dL    Performed by Sudeep gallegos RN    GLUCOSE, POC    Collection Time: 10/21/22  4:27 PM   Result Value Ref Range    Glucose (POC) 212 (H) 65 - 117 mg/dL    Performed by Camille 8, POC    Collection Time: 10/21/22 10:44 PM   Result Value Ref Range    Glucose (POC) 307 (H) 65 - 117 mg/dL    Performed by Martha Thibodeaux (TRV RN)    CBC WITH AUTOMATED DIFF    Collection Time: 10/22/22  1:10 AM   Result Value Ref Range    WBC 11.7 (H) 3.6 - 11.0 K/uL    RBC 3.37 (L) 3.80 - 5.20 M/uL    HGB 11.2 (L) 11.5 - 16.0 g/dL    HCT 32.5 (L) 35.0 - 47.0 %    MCV 96.4 80.0 - 99.0 FL    MCH 33.2 26.0 - 34.0 PG    MCHC 34.5 30.0 - 36.5 g/dL    RDW 14.7 (H) 11.5 - 14.5 %    PLATELET 024 (L) 676 - 400 K/uL    MPV 10.3 8.9 - 12.9 FL    NRBC 0.0 0  WBC    ABSOLUTE NRBC 0.00 0.00 - 0.01 K/uL    NEUTROPHILS 93 (H) 32 - 75 %    LYMPHOCYTES 3 (L) 12 - 49 %    MONOCYTES 4 (L) 5 - 13 %    EOSINOPHILS 0 0 - 7 %    BASOPHILS 0 0 - 1 %    IMMATURE GRANULOCYTES 0 %    ABS. NEUTROPHILS 10.8 (H) 1.8 - 8.0 K/UL    ABS. LYMPHOCYTES 0.4 (L) 0.8 - 3.5 K/UL    ABS. MONOCYTES 0.5 0.0 - 1.0 K/UL    ABS. EOSINOPHILS 0.0 0.0 - 0.4 K/UL    ABS. BASOPHILS 0.0 0.0 - 0.1 K/UL    ABS. IMM.  GRANS. 0.0 K/UL    DF MANUAL      RBC COMMENTS MACROCYTOSIS  1+       METABOLIC PANEL, BASIC    Collection Time: 10/22/22  1:10 AM   Result Value Ref Range    Sodium 131 (L) 136 - 145 mmol/L    Potassium 4.9 3.5 - 5.1 mmol/L    Chloride 106 97 - 108 mmol/L    CO2 19 (L) 21 - 32 mmol/L    Anion gap 6 5 - 15 mmol/L    Glucose 272 (H) 65 - 100 mg/dL    BUN 26 (H) 6 - 20 MG/DL    Creatinine 0.94 0.55 - 1.02 MG/DL    BUN/Creatinine ratio 28 (H) 12 - 20      eGFR >60 >60 ml/min/1.73m2    Calcium 8.1 (L) 8.5 - 10.1 MG/DL   MAGNESIUM    Collection Time: 10/22/22  1:10 AM   Result Value Ref Range    Magnesium 1.9 1.6 - 2.4 mg/dL   PHOSPHORUS    Collection Time: 10/22/22  1:10 AM   Result Value Ref Range    Phosphorus 2.4 (L) 2.6 - 4.7 MG/DL   GLUCOSE, POC    Collection Time: 10/22/22  6:18 AM   Result Value Ref Range    Glucose (POC) 162 (H) 65 - 117 mg/dL    Performed by Milena Camilo        Additional comments:  I have reviewed the patient's new clinical lab test results. Care Plan discussed with:  Patient x   Family    RN x   Care Manager    Consultant/Specialist:       Signed: Jan Mandel MD

## 2022-10-23 NOTE — PROGRESS NOTES
[facility]  Hospitalist Group                                                                                          Hospitalist Progress Note  Marian Stack MD  Answering service: [phone] from in house phone        Date of Service:  10/23/2022  NAME:  Marita Waterman  :  1945  MRN:  [id number]      Admission Summary:   71-year-old  female with past medical history significant for autoimmune hepatitis with cirrhosis, history of systemic lupus erythematosus, chronic immunosuppression, secondary diabetes mellitus who happened to be in her usual state of health until 2 weeks ago, when she started feeling very weak and fatigued, especially weak in the lower extremity with difficulty standing up and using her walker, to a point where she can hardly walk, all of this after a Flu shot    Patient was admitted for Pneumonia       Interval history / Subjective:    Patient seen and examined     Assessment & Plan:      1. Sepsis due to Pneumonia. \ improve, no further antibiotics needed. Monitor respiratory status,once the antibiotics isover  2. Autoimmune Hepatitis. Liver Cirrhosis. Continue current management  3. SLE. Off medication for more than 3 month, no sign of activity now, most ikely associated with the immune suppression to other disease process. 4. Type 2 DM. Monitor. Continue medication. Lantus increase to 20 units. Monitor and adjust medication as needed  5. GERD. On PPI, patient will benefit to be off PPI, general measures. 6. GBS. Continue  current IVIG                Code status: Full  Prophylaxis: Heparin  Care Plan discussed with: Patient and Nurse  Anticipated Disposition: D     Hospital Problems  Date Reviewed: 10/16/2022            Codes Class Noted POA    Pneumonia ICD-10-CM: J18.9  ICD-9-CM: 776  10/18/2022 Unknown             Review of Systems:   A comprehensive review of systems was negative except for that written in the HPI. Vital Signs:    Last 24hrs VS reviewed since prior progress note. Most recent are:  Visit Vitals  BP (!) 132/90   Pulse 86   Temp 98 °F (36.7 °C)   Resp 18   Wt 52.2 kg (115 lb)   SpO2 97%   BMI 22.46 kg/m²       No intake or output data in the 24 hours ending 10/23/22 1229     Physical Examination:     I had a face to face encounter with this patient and independently examined them on 10/23/2022 as outlined below:          Constitutional:  No acute distress, cooperative, pleasant    ENT:  Oral mucosa moist, oropharynx benign. Resp:  CTA bilaterally. No wheezing/rhonchi/rales. No accessory muscle use. CV:  Regular rhythm, normal rate, no murmurs, gallops, rubs    GI:  Soft, non distended, non tender. normoactive bowel sounds, no hepatosplenomegaly     Musculoskeletal:  No edema, warm, 2+ pulses throughout    Neurologic:  Moves all extremities. AAOx3, CN II-XII reviewed            Data Review:    Review and/or order of clinical lab test      Labs:     Recent Labs     10/22/22  0110 10/21/22  0343   WBC 11.7* 11.3*   HGB 11.2* 11.8   HCT 32.5* 34.8*   * 94*     Recent Labs     10/23/22  0439 10/22/22  0110 10/21/22  0343   NA  --  131* 130*   K  --  4.9 5.0   CL  --  106 104   CO2  --  19* 18*   BUN  --  26* 24*   CREA  --  0.94 0.62   GLU  --  272* 201*   CA  --  8.1* 8.5   MG 1.8 1.9 1.7   PHOS 2.3* 2.4* 2.4*     No results for input(s): ALT, AP, TBIL, TBILI, TP, ALB, GLOB, GGT, AML, LPSE in the last 72 hours. No lab exists for component: SGOT, GPT, AMYP, HLPSE  No results for input(s): INR, PTP, APTT, INREXT in the last 72 hours. No results for input(s): FE, TIBC, PSAT, FERR in the last 72 hours. Lab Results   Component Value Date/Time    Folate 12.1 10/19/2022 02:40 AM      No results for input(s): PH, PCO2, PO2 in the last 72 hours. No results for input(s): CPK, CKNDX, TROIQ in the last 72 hours.     No lab exists for component: CPKMB  No results found for: CHOL, [address] [facility], [facility], [address], HDL, HDLP, LDL, LDLC, DLDLP, TGLX, TRIGL, TRIGP, CHHD, CHHDX  Lab Results   Component Value Date/Time    Glucose (POC) 239 (H) 10/23/2022 12:03 PM    Glucose (POC) 258 (H) 10/23/2022 06:48 AM    Glucose (POC) 350 (H) 10/22/2022 09:10 PM    Glucose (POC) 302 (H) 10/22/2022 04:48 PM    Glucose (POC) 129 (H) 10/22/2022 11:42 AM     Lab Results   Component Value Date/Time    Color YELLOW/STRAW 10/18/2022 03:13 PM    Appearance CLEAR 10/18/2022 03:13 PM    Specific gravity 1.028 10/18/2022 03:13 PM    Specific gravity 1.025 09/03/2022 04:41 PM    pH (UA) 5.0 10/18/2022 03:13 PM    Protein Negative 10/18/2022 03:13 PM    Glucose >1,000 (A) 10/18/2022 03:13 PM    Ketone Negative 10/18/2022 03:13 PM    Bilirubin Negative 10/18/2022 03:13 PM    Urobilinogen 0.2 10/18/2022 03:13 PM    Nitrites Negative 10/18/2022 03:13 PM    Leukocyte Esterase Negative 10/18/2022 03:13 PM    Epithelial cells FEW 10/18/2022 03:13 PM    Bacteria Negative 10/18/2022 03:13 PM    WBC 0-4 10/18/2022 03:13 PM    RBC 5-10 10/18/2022 03:13 PM         Medications Reviewed:     Current Facility-Administered Medications   Medication Dose Route Frequency    immune globulin 10% (GAMUNEX-C) infusion 20 g  20 g IntraVENous Q24H    Followed by    [START ON 10/25/2022] immune globulin 10% (GAMUNEX-C) infusion 15 g  15 g IntraVENous Q24H    insulin glargine (LANTUS) injection 20 Units  20 Units SubCUTAneous DAILY    melatonin tablet 6 mg  6 mg Oral QHS PRN    glucose chewable tablet 16 g  4 Tablet Oral PRN    glucagon (GLUCAGEN) injection 1 mg  1 mg IntraMUSCular PRN    dextrose 10 % infusion 0-250 mL  0-250 mL IntraVENous PRN    insulin lispro (HUMALOG) injection   SubCUTAneous AC&HS    dextrose 10 % infusion 0-250 mL  0-250 mL IntraVENous PRN    insulin lispro (HUMALOG) injection 3 Units  0.05 Units/kg SubCUTAneous TID WITH MEALS    traMADoL (ULTRAM) tablet 50 mg  50 mg Oral Q6H PRN    sodium chloride (NS) flush 5-40 mL  5-40 mL IntraVENous Q8H    sodium chloride (NS) flush 5-40 mL  5-40 mL IntraVENous PRN    acetaminophen (TYLENOL) tablet 650 mg  650 mg Oral Q6H PRN    Or    acetaminophen (TYLENOL) suppository 650 mg  650 mg Rectal Q6H PRN    polyethylene glycol (MIRALAX) packet 17 g  17 g Oral DAILY PRN    ondansetron (ZOFRAN ODT) tablet 4 mg  4 mg Oral Q8H PRN    Or    ondansetron (ZOFRAN) injection 4 mg  4 mg IntraVENous Q6H PRN    budesonide (ENTOCORT EC) capsule 9 mg  9 mg Oral DAILY    mycophenolate mofetil (CELLCEPT) capsule 500 mg  500 mg Oral ACB&D    pantoprazole (PROTONIX) tablet 40 mg  40 mg Oral DAILY    spironolactone (ALDACTONE) tablet 100 mg  100 mg Oral DAILY    cefTRIAXone (ROCEPHIN) 1 g in 0.9% sodium chloride 10 mL IV syringe  1 g IntraVENous Q24H    0.9% sodium chloride infusion  100 mL/hr IntraVENous CONTINUOUS     ______________________________________________________________________  EXPECTED LENGTH OF STAY: 4d 19h  ACTUAL LENGTH OF STAY:          Mejia Montes MD

## 2022-10-24 NOTE — PROGRESS NOTES
1145: Removed patient's PIV due to infiltration. 1159:  Perfect serve sent to [facility]- just fyi, pt's PIV infiltrated and I had to remove it. Patient is getting IVIG, but we currently have no IV access. Patient states that she has been stuck several times for IVs and is a hard stick. Can we have an order for a midline? \"

## 2022-10-24 NOTE — PROGRESS NOTES
Neurology Progress Note     NAME: Lilia Fairchild   :  1945   MRN:  [id number]   DATE:  10/24/2022    Assessment:     Active Problems:    Pneumonia (10/18/2022)    Pt is a 77yo right-handed female with DM, autoimmune hepatitis with cirrhosis, SLE on chronic immunosuppression with Cellcept, admitted 10/18/22 due to severe weakness in LE preventing her from walking. She was found on admission to a leukocytosis and lactic acidosis, CXR with minimal bibasilar opacities concerning for PNA, started on Zithromax and Rocephin. She feels her symptoms worsened after receiving the flu vaccine 4 weeks ago. She underwent MRI L-spine w/wo contrast 10/19/22 without mention of nerve root enhancement, does have degenerative changes with mod to severe central stenosis. LP 10/20/22 with cytoalbuminologic dissociation with Pro 107. Exam today: continues to have mild dyspnea, BUE 5/5, arrelexic BLE bilat HF 4-/5, DF/PF 4-/5  IgA level 454  Plan:   **Presumed GBS  - Suspect GBS given progressive LE weakness after flu vaccine with absent LE reflexes and elevated CSF protein. - MRI L/S-spine showed no pathology as cause  - Continue IVIG 0.4g/kg daily x 5 days (today is day 3/5)  - PT/OT  - We will re-examine the patient tomorrow for any progress after 3rd dose IVIG    Subjective:   Continues to have mild SOB. Patient feels her LE symptoms have not improved. She is concerned flu vaccine caused her symptoms.     Objective:   Chart reviewed since last seen    Current Facility-Administered Medications   Medication Dose Route Frequency    phosphorus (K PHOS NEUTRAL) 250 mg tablet 1 Tablet  1 Tablet Oral BID    insulin lispro (HUMALOG) injection 6 Units  6 Units SubCUTAneous TID WITH MEALS    dextrose 10 % infusion 0-250 mL  0-250 mL IntraVENous PRN    [START ON 10/25/2022] insulin NPH (NOVOLIN N, HUMULIN N) injection 20 Units  20 Units SubCUTAneous DAILY    [START ON 10/25/2022] insulin lispro (HUMALOG) injection 3 Units  0.05 Units/kg SubCUTAneous TID WITH MEALS    immune globulin 10% (GAMUNEX-C) infusion 20 g  20 g IntraVENous Q24H    Followed by    Sharron Bowling ON 10/25/2022] immune globulin 10% (GAMUNEX-C) infusion 15 g  15 g IntraVENous Q24H    melatonin tablet 6 mg  6 mg Oral QHS PRN    glucose chewable tablet 16 g  4 Tablet Oral PRN    glucagon (GLUCAGEN) injection 1 mg  1 mg IntraMUSCular PRN    dextrose 10 % infusion 0-250 mL  0-250 mL IntraVENous PRN    insulin lispro (HUMALOG) injection   SubCUTAneous AC&HS    dextrose 10 % infusion 0-250 mL  0-250 mL IntraVENous PRN    traMADoL (ULTRAM) tablet 50 mg  50 mg Oral Q6H PRN    sodium chloride (NS) flush 5-40 mL  5-40 mL IntraVENous Q8H    sodium chloride (NS) flush 5-40 mL  5-40 mL IntraVENous PRN    acetaminophen (TYLENOL) tablet 650 mg  650 mg Oral Q6H PRN    Or    acetaminophen (TYLENOL) suppository 650 mg  650 mg Rectal Q6H PRN    polyethylene glycol (MIRALAX) packet 17 g  17 g Oral DAILY PRN    ondansetron (ZOFRAN ODT) tablet 4 mg  4 mg Oral Q8H PRN    Or    ondansetron (ZOFRAN) injection 4 mg  4 mg IntraVENous Q6H PRN    budesonide (ENTOCORT EC) capsule 9 mg  9 mg Oral DAILY    mycophenolate mofetil (CELLCEPT) capsule 500 mg  500 mg Oral ACB&D    pantoprazole (PROTONIX) tablet 40 mg  40 mg Oral DAILY    spironolactone (ALDACTONE) tablet 100 mg  100 mg Oral DAILY    0.9% sodium chloride infusion  100 mL/hr IntraVENous CONTINUOUS       Visit Vitals  /77   Pulse 76   Temp 98.3 °F (36.8 °C)   Resp 18   Wt 52.2 kg (115 lb)   SpO2 99%   BMI 22.46 kg/m²     Temp (24hrs), Av °F (36.7 °C), Min:97.6 °F (36.4 °C), Max:98.3 °F (36.8 °C)      10/24 0701 - 10/24 1900  In: -   Out: 600 [Urine:600]  No intake/output data recorded. Physical Exam:  General: Frail patient with mild MCNEAL  Cardiac: Regular rate and rhythm with no murmurs.    Resp: Mild dyspnea during history/exam  Extremities: 2+ Radial pulses, no cyanosis or edema    Neurological Exam:  Mental Status: Oriented to time, place and person. Speech and language intact. Attention and fund of knowledge appropriate. Normal recent and remote memory. Cranial Nerves:   VFF, EOMI, no nystagmus, no ptosis. Facial movement is symmetric. Hearing is intact. Motor:  5/5 strength in upper extremities. Bilat HF 4-/5, bilateral PF/DF 4-/5. Normal bulk and tone. No PD. No tremors   Reflexes:   Deep tendon reflexes 2+ and symmetric in UE, absent in LE. Toes downgoing. Sensory:   Intact to LT and PP   Gait:  JACKIE   Cerebellar:           Lab Review   Recent Results (from the past 24 hour(s))   GLUCOSE, POC    Collection Time: 10/23/22  5:08 PM   Result Value Ref Range    Glucose (POC) 264 (H) 65 - 117 mg/dL    Performed by Camille Bradley, POC    Collection Time: 10/23/22  8:46 PM   Result Value Ref Range    Glucose (POC) 260 (H) 65 - 117 mg/dL    Performed by Alberto Bailey    MAGNESIUM    Collection Time: 10/24/22  3:17 AM   Result Value Ref Range    Magnesium 1.9 1.6 - 2.4 mg/dL   PHOSPHORUS    Collection Time: 10/24/22  3:17 AM   Result Value Ref Range    Phosphorus 1.9 (L) 2.6 - 4.7 MG/DL   GLUCOSE, POC    Collection Time: 10/24/22  6:12 AM   Result Value Ref Range    Glucose (POC) 82 65 - 117 mg/dL    Performed by [employer], POC    Collection Time: 10/24/22 11:35 AM   Result Value Ref Range    Glucose (POC) 254 (H) 65 - 117 mg/dL    Performed by Margie Lo        Additional comments:  I have reviewed the patient's new clinical lab test results.     Care Plan discussed with:  Patient x   Family    RN x   Care Manager    Consultant/Specialist:       AMADO Lobato

## 2022-10-24 NOTE — PROGRESS NOTES
Midline Insertion and Progress Note    PRE-PROCEDURE VERIFICATION  Correct Procedure: yes  Correct Site:  yes  Temperature: Temp: 98.3 °F (36.8 °C), Temperature Source: Temp Source: Oral  Recent Labs     10/22/22  0110   BUN 26*   CREA 0.94   *   WBC 11.7*     Allergies: Budesonide and Pcn [penicillins]    PROCEDURE DETAIL  A single lumen midline IV catheter was started for desire for reliable access. The following documentation is in addition to the Midline properties in the lines/airways flowsheet :  Xylocaine 1% used intradermally  Lot #: [id number]  Catheter Total Length: 16 (cm)  External Catheter Length: 0 (cm)  Circumference: 30 (cm)  Vein Selection for Midline :left basilic  Complication Related to Insertion: none    Line is okay to use.

## 2022-10-24 NOTE — DIABETES MGMT
[address] [facility]    CLINICAL NURSE SPECIALIST CONSULT     Initial Presentation   Joon Javier is a 69 y.o. female who presented to the ED 10/18/22 with a 2wk c/o progressive fatigue. She has become wheelchair-bound and requiring significant assistance for transfers and performing ADLs. In the ED, labs were significant for: WBC 15, , , Alk phos 169, ALT 38, Lac 4.8. UA 1000+ glucosuria, neg ketones. CXR with questionable pneumonia. Blood cx were obtained and she was admitted for medical management. HX:   Past Medical History:   Diagnosis Date    Autoimmune hepatitis ([facility] [address] 2021    GERD (gastroesophageal reflux disease)     Lupus (HCC)     lupus    PMB (postmenopausal bleeding) 08/2022    Uterine fibroid 07/03/2022        INITIAL DX:   Pneumonia [J18.9]     Current Treatment     TX: Blood cx, IV antibitoics    Consulted by   Demarco Solis DO   for advanced diabetes nursing assessment and care for:   [x] Survival skill education    Hospital Course   Clinical progress has been uncomplicated. 10/18: Admission  10/19: Seen by Neurology. GBS work-up   10/21: MRI negative for acute process. LP CSF with highly elevated protein, negative for meningitis and no elevated white count. Treating for GBS with IVIG x5 days  Diabetes History   Type 2 Diabetes  Ambulatory BG management provided by: Jasmin Allen NP      Diabetes-related Medical History  Neurological complications  Peripheral neuropathy  Other associated conditions     Hyponatremia, Cirrhosis     Diabetes Medication History  Key Antihyperglycemic Medications               glimepiride (AMARYL) 1 mg tablet (Taking) Take 1 mg by mouth two (2) times a day. Patient tells me she takes 1 tablet of metformin and 2 tablets of amaryl daily. DaughterAnia ([phone]) will confirm prescriptions and call me back to confirm.     Diabetes self-management practices:   Eating pattern   On a low salt diet  [x] Breakfast Oatmeal  [x] Lunch  [place]   [x] Dinner  Meatloaf, Air fried vegetables  [x] Bedtime Limited  [x] Snacks  Limited  [x] Beverages Water, coffee  Physical activity pattern   Weak   Now using a rollator  Monitoring pattern   Patient tells me she checks once daily: numbers 200-300s   Daughter says that she checks maybe once weekly Fasting is usually 100s and evening up to 300s     Taking medications pattern  [x] Consistent administration  [x] Affordable  Social determinants of health impacting diabetes self-management practices   Concerned that you need to know more about how to stay healthy with diabetes    Overall evaluation:    [x] Achieving A1c target with drug therapy & self-care practices      Started on oral budesonide 9mg with hepatology earlier this year by hepatologist for autoimmune hepatitis   Plan per last note: Once we have her on a stable high does of cellcept will start to taper the budesonide. And leave her on cellcept alone. Subjective   I am so tired.      Objective   Physical exam  General  Normal weight white and frail female. Resting in bed. Appears exhausted. Conversant and cooperative  Neuro  Alert, oriented   Vital Signs Visit Vitals  /77   Pulse 76   Temp 98.3 °F (36.8 °C)   Resp 18   Wt 52.2 kg (115 lb)   SpO2 99%   BMI 22.46 kg/m²     Skin  Warm and dry. No acanthosis noted along neckline. Heart   Regular rate and rhythm.  No murmurs, rubs or gallops  Lungs  Clear to auscultation without rales or rhonchi  Extremities No foot wounds      Laboratory  Recent Labs     10/22/22  0110   *   AGAP 6   WBC 11.7*   CREA 0.94         Factors impacting BG management  Factor Dose Comments   Nutrition:  Standard meals     60 grams/meal      Infection Pneumonia  IV antibiotics    Alcoholic hepatitis  Cirrhosis       Blood glucose pattern      Significant diabetes-related events over the past 24-72 hours  A1C 8.2%  Fasting B  Pre-prandial B-364  Basal: 20 units Lantus daily  Bolus: 3 units with meals  Correction: 20 units in the last 24h  On cellcept for liver inflammation   9mg budesonide       Assessment and Plan   Nursing Diagnosis Risk for unstable blood glucose pattern   Nursing Intervention Domain 5259 Decision-making Support   Nursing Interventions Examined current inpatient diabetes/blood glucose control   Explored factors facilitating and impeding inpatient management  Explored corrective strategies with patient and responsible inpatient provider   Informed patient of rational for insulin strategy while hospitalized       Evaluation   José Miguel Collier is a 69year old female, with Type 2 Diabetes and cirrhosis s/t autoimmune hepatitis, who is admitted with sepsis s/t community acquired pneumonia. A1C on admission was 8.2% (improved from 9.9%) with recent adjustments in oral antihyperglycemic agents a few weeks ago with her PCP. She is prescribed 9mg budesonide- starting earlier this year for treatment of autoimmune hepatitis and elevations in R6F most certainly related to steroid use. She does endorse stable fasting BG values PTA but jacinto see hyperglycemia to the 200-300s in the evenings prior to hospital admission. Her BG was elevated on admission at 325 but now fasting 106 after IV hydration. Factors impacting glucose at this time are infection/PNA, budesonide, cirrhosis and elevated A1C. She continues to have steroid induced hyperglycemia s/t budesonide despite high dose glargine and mealtime humalog. Consider switching to high dose NPH dosed once daily to offset daytime steroid impact. Recommendations   POC glucose ACHS    Consistent carbohydrate diet (60 grams CHO/meal)    3. Continue the subcutaneous Insulin Order set (6865): Insulin Dosing Specific recommendation   Basal                                       (Based on weight, BMI & GFR) Noted 20 units Lantus given this morning.     Consider switching to NPH to address high pre-prandial hyperglycemia:  20 units NPH with each dose of budesonide. If eating more than 50% of meals and pre-meal BG sustained over 180mg/dl, start Nutritional                                      (Based on CHO/dextrose load) Resistant sensitivity: 6 units Humalog/meal today. Resume 3 units/meal if switching to high dose NPH tomorrow       Corrective                                       (Useful in adjusting insulin dosing) [x] Normal sensitivity ACHS starting at a BG of 200        Discharge Recommendations   Has a follow-up visit with endocrinologist: Cameron Llamas NP. [facility] 11/17/22 at 1pm. Please fax clinicals to [phone] at discharge. On Discharge, please place an outpatient order for \"diabetes education\" (enter as REF20). This will trigger a referral for the Program [facility] Diabetes [facility] which includes outpatient diabetes self management training with a certified diabetes educator. Continue metformin 500mg daily. Stop amaryl     Likely will need basal insulin given with each budesonide dose. Dosing and recs TBD. Billing Code(s)   [x] 77569   Before making these care recommendations, I personally reviewed the hospitalization record, including notes, laboratory & diagnostic data and current medications, and examined the patient at the bedside (circumstances permitting) before making care recommendations. More than fifty (50) percent of the time was spent in patient counseling and/or care coordination.   Total minutes: 28      ALBERTO Stanford-ADM  Board Certified Advanced Diabetes Manager  Clinical Nurse Specialist  [facility]  Access via [address] [place] [address]

## 2022-10-24 NOTE — PROGRESS NOTES
Attempted to see pt for OT. Pt kindly declined stating, \"I hate to say no,\" after just receiving IV catheter. Per pt, L UE was painful. Will follow up tomorrow for OT.

## 2022-10-24 NOTE — PROGRESS NOTES
Hospitalist Progress Note    NAME: Quinten Nair   :  1945   MRN:  [id number]   Room Number:  720/65  @ [address]     Please note that this dictation was completed with Iwona Morgan, the computer voice recognition software. Quite often unanticipated grammatical, syntax, homophones, and other interpretive errors are inadvertently transcribed by the computer software. Please disregard these errors. Please excuse any errors that have escaped final proofreading. Interim Hospital Summary: 69 y.o. female whom presented on 10/18/2022 with    30-year-old  female with past medical history significant for autoimmune hepatitis with cirrhosis, history of systemic lupus erythematosus, chronic immunosuppression, secondary diabetes mellitus who happened to be in her usual state of health until 2 weeks ago, when she started feeling very weak and fatigued, especially weak in the lower extremity with difficulty standing up and using her walker, to a point where she can hardly walk, all of this after a Flu shot    Patient was admitted for Pneumonia     Assessment / Plan:  Anticipated discharge date : TBD  Anticipated disposition : IPR  Barriers to discharge : medical stability       #Suspected Guillain-Barré syndrome POA  #Progressive lower extremity weakness due to above  #Absent LE reflexes  Due To above  -MRI lumbar spine with L2-L3 disc extrusion with moderate spinal and left foraminal stenosis.   L4-L5 severe spinal stenosis  -Lumbar puncture 10/20/2022 with elevated protein  -Assessed by neurology and started on IVIG    -Continue IVIG per neurology  -PT OT    #Community-acquired pneumonia POA resolved  -Patient completed course of antibiotic for 5 days    #History of autoimmune hepatitis with liver cirrhosis POA  #History of systemic lupus erythematosus POA  -Continue home regimen budesonide and CellCept    #Type 2 diabetes mellitus  - NPH  - Lispro correctional scale, FSG AC HS  - Consistent carb diet, hypoglycemia protocol. #History of GERD  #Hypertension  - cont PPI and Aldactone            Code status: Full  Prophylaxis: Lovenox  Recommended Disposition: SNF/LTC     Subjective:     Chief Complaint / Reason for Physician Visit  LE weakness . Discussed with RN events overnight. Review of Systems:  No fevers, chills, appetite change, cough, sputum production, shortness of breath, dyspnea on exertion, nausea, vomitting, diarrhea, constipation, chest pain, leg edema, abdominal pain, joint pain, rash, itching. . Tolerating diet. Objective:     VITALS:   Last 24hrs VS reviewed since prior progress note. Most recent are:  Patient Vitals for the past 24 hrs:   Temp Pulse Resp BP SpO2   10/24/22 0802 98.3 °F (36.8 °C) 76 18 123/77 99 %   10/23/22 2019 98 °F (36.7 °C) 85 -- 123/77 98 %   10/23/22 1502 97.6 °F (36.4 °C) 89 18 (!) 144/80 99 %     No intake or output data in the 24 hours ending 10/24/22 0907     PHYSICAL EXAM:  General: WD, WN. Alert, cooperative, no acute distress    EENT:  EOMI. Anicteric sclerae. MMM  Resp:  CTA bilaterally, no wheezing or rales. No accessory muscle use  CV:  Regular  rhythm,  normal S1/S2, no murmurs rubs gallops, No edema  GI:  Soft, Non distended, Non tender. +Bowel sounds  Neurologic:  Alert and oriented X 3, normal speech,   Psych:   Good insight. Not anxious nor agitated  Skin:  No rashes.   No jaundice    Reviewed most current lab test results and cultures  YES  Reviewed most current radiology test results   YES  Review and summation of old records today    NO  Reviewed patient's current orders and MAR    YES  PMH/SH reviewed - no change compared to H&P  ________________________________________________________________________  Care Plan discussed with:    Comments   Patient x    Family      RN x    Care Manager x    Consultant  x                     x Multidiciplinary team rounds were held today with , nursing, pharmacist and clinical coordinator. Patient's plan of care was discussed; medications were reviewed and discharge planning was addressed. ________________________________________________________________________  Total NON critical care TIME:  35   Minutes    Total CRITICAL CARE TIME Spent:   Minutes non procedure based      Comments   >50% of visit spent in counseling and coordination of care x    ________________________________________________________________________  Radha Domínguez MD     Procedures: see electronic medical records for all procedures/Xrays and details which were not copied into this note but were reviewed prior to creation of Plan. LABS:  I reviewed today's most current labs and imaging studies.   Pertinent labs include:  Recent Labs     10/22/22  0110   WBC 11.7*   HGB 11.2*   HCT 32.5*   *     Recent Labs     10/24/22  0317 10/23/22  0439 10/22/22  0110   NA  --   --  131*   K  --   --  4.9   CL  --   --  106   CO2  --   --  19*   GLU  --   --  272*   BUN  --   --  26*   CREA  --   --  0.94   CA  --   --  8.1*   MG 1.9 1.8 1.9   PHOS 1.9* 2.3* 2.4*       Signed: Radha Domínguez MD

## 2022-10-24 NOTE — PROGRESS NOTES
RUR: 13% Low     EYAD: [facility]. Will need BLS transport arranged once medically stable. Follow-up with PCP/specialist.      Primary Contact: Daughter, John Brooke, [phone] or , Pat Estrella, [phone]     *Will need 2nd IM letter prior to discharge. 11:42AM -  reviewed chart. Per review and ID rounds, patient to complete IVIG treatment for 5 days per neurologist. Leatha Lao spoke with Edyta Balderas [facility] liaison (p: [phone]) to provide update. Patient remains accepted to [email or web address]. CM will continue to follow as needed.      DARSHAN Figueredo   [phone] Normal rate, regular rhythm.  Heart sounds S1, S2

## 2022-10-24 NOTE — PROGRESS NOTES
Problem: Mobility Impaired (Adult and Pediatric)  Goal: *Acute Goals and Plan of Care (Insert Text)  Description: FUNCTIONAL STATUS PRIOR TO ADMISSION: Patient was modified independent using a rollator for functional mobility, h/o falls (weekly, collapses), ambulated full flight of steps to the shower. HOME SUPPORT PRIOR TO ADMISSION: The patient lived with her spouse, did not require assistance. Physical Therapy Goals  Initiated 10/19/2022  1. Patient will move from supine to sit and sit to supine  in bed with modified independence within 7 day(s). 2.  Patient will transfer from bed to chair and chair to bed with modified independence using the least restrictive device within 7 day(s). 3.  Patient will perform sit to stand with modified independence within 7 day(s). 4.  Patient will ambulate with modified independence for 50 feet with the least restrictive device within 7 day(s). Outcome: Progressing Towards Goal   PHYSICAL THERAPY TREATMENT  Patient: Nicole Hernadez (89 y.o. female)  Date: 10/24/2022  Diagnosis: Pneumonia [J18.9] <principal problem not specified>      Precautions: Fall  Chart, physical therapy assessment, plan of care and goals were reviewed. ASSESSMENT  Patient continues with skilled PT services and is progressing towards goals. Patient presents in bed. Is fatigued from IV line placement. States that she needs cleaning as she has stooled in her brief. Patient performed bed mobility and sit-stand from bed x 4 (had to take seated rest breaks), stood for cleaning, hygiene and brief change. Transferred from bed to chair, taking a few shuffling steps with RW. Patient's LEs are weaker today than on Friday, as she was able to walk only a few steps today. Will continue to follow tomorrow. Current Level of Function Impacting Discharge (mobility/balance): Minimal-moderate assistance for supine-sit; Moderate assistance for sit-stand and transfer with RW.     Other factors to consider for discharge: Far from modified Independent PLOF/Motivated/A & O x 4/Supportive Family          PLAN :  Patient continues to benefit from skilled intervention to address the above impairments. Continue treatment per established plan of care. to address goals. Recommendation for discharge: (in order for the patient to meet his/her long term goals)  Therapy 3 hours per day 5-7 days per week    This discharge recommendation:  Has been made in collaboration with the attending provider and/or case management    IF patient discharges home will need the following DME: to be determined (TBD)       SUBJECTIVE:   Patient stated I am feeling weaker today.     OBJECTIVE DATA SUMMARY:   Critical Behavior:  Neurologic State: Alert  Orientation Level: Oriented X4  Cognition: Appropriate decision making, Appropriate for age attention/concentration, Appropriate safety awareness, Follows commands, Memory loss  Safety/Judgement: Good awareness of safety precautions  Functional Mobility Training:  Bed Mobility:     Supine to Sit: Minimum assistance; Moderate assistance  Sit to Supine:  (left up in chair)           Transfers:  Sit to Stand: Moderate assistance;Assist x1;Adaptive equipment  Stand to Sit: Minimum assistance;Assist x1        Bed to Chair: Moderate assistance;Assist x1;Adaptive equipment                    Balance:  Sitting: Impaired; Without support  Sitting - Static: Good (unsupported)  Sitting - Dynamic: Fair (occasional)  Standing: Impaired; With support  Standing - Static: Fair;Constant support  Standing - Dynamic : Poor;Constant support  Ambulation/Gait Training:  Distance (ft):  (only took a few steps with RW from bed to chair)                Activity Tolerance:   Fair    After treatment patient left in no apparent distress:   Sitting in chair, Call bell within reach, Bed / chair alarm activated, and nurse notified.     COMMUNICATION/COLLABORATION:   The patients plan of care was discussed with: Registered nurse and Rehabilitation technician.      Segun Manriquez   Time Calculation: 30 mins

## 2022-10-25 NOTE — PROGRESS NOTES
Neurology Progress Note     NAME: Clifton Osgood   :  1945   MRN:  [id number]   DATE:  10/25/2022    Assessment:     Active Problems:    Pneumonia (10/18/2022)    Pt is a 77yo right-handed female with DM, autoimmune hepatitis with cirrhosis, SLE on chronic immunosuppression with Cellcept, admitted 10/18/22 due to severe weakness in LE preventing her from walking. She was found on admission to a leukocytosis and lactic acidosis, CXR with minimal bibasilar opacities concerning for PNA, started on Zithromax and Rocephin. She feels her symptoms worsened after receiving the flu vaccine 4 weeks ago. She underwent MRI L-spine w/wo contrast 10/19/22 without mention of nerve root enhancement, does have degenerative changes with mod to severe central stenosis. LP 10/20/22 with cytoalbuminologic dissociation with Pro 107. Exam today: SOB is better and patient is off N/C. BUE 5/5, arrelexic BLE bilat HF 4-/5, DF/PF 4/5, better inversion  IgA level 454  Plan:   **Presumed GBS  - Suspect GBS given progressive LE weakness after flu vaccine with absent LE reflexes and elevated CSF protein. - MRI L/S-spine showed no pathology as cause  - Continue IVIG 0.4g/kg daily x 5 days (today is day 4/5)  - continue PT/OT with rec IPR  - We will re-examine the patient tomorrow for continued progress    Subjective:   SOB is better and she is off N/C. Patient feels her LE symptoms have improved as well.      Objective:   Chart reviewed since last seen    Current Facility-Administered Medications   Medication Dose Route Frequency    insulin lispro (HUMALOG) injection 6 Units  6 Units SubCUTAneous TID WITH MEALS    insulin glargine (LANTUS) injection 20 Units  20 Units SubCUTAneous DAILY    phosphorus (K PHOS NEUTRAL) 250 mg tablet 1 Tablet  1 Tablet Oral BID    dextrose 10 % infusion 0-250 mL  0-250 mL IntraVENous PRN    immune globulin 10% (GAMUNEX-C) infusion 15 g  15 g IntraVENous Q24H    melatonin tablet 6 mg  6 mg Oral QHS PRN    glucose chewable tablet 16 g  4 Tablet Oral PRN    glucagon (GLUCAGEN) injection 1 mg  1 mg IntraMUSCular PRN    insulin lispro (HUMALOG) injection   SubCUTAneous AC&HS    traMADoL (ULTRAM) tablet 50 mg  50 mg Oral Q6H PRN    sodium chloride (NS) flush 5-40 mL  5-40 mL IntraVENous Q8H    sodium chloride (NS) flush 5-40 mL  5-40 mL IntraVENous PRN    acetaminophen (TYLENOL) tablet 650 mg  650 mg Oral Q6H PRN    Or    acetaminophen (TYLENOL) suppository 650 mg  650 mg Rectal Q6H PRN    polyethylene glycol (MIRALAX) packet 17 g  17 g Oral DAILY PRN    ondansetron (ZOFRAN ODT) tablet 4 mg  4 mg Oral Q8H PRN    Or    ondansetron (ZOFRAN) injection 4 mg  4 mg IntraVENous Q6H PRN    budesonide (ENTOCORT EC) capsule 9 mg  9 mg Oral DAILY    mycophenolate mofetil (CELLCEPT) capsule 500 mg  500 mg Oral ACB&D    pantoprazole (PROTONIX) tablet 40 mg  40 mg Oral DAILY    spironolactone (ALDACTONE) tablet 100 mg  100 mg Oral DAILY    0.9% sodium chloride infusion  100 mL/hr IntraVENous CONTINUOUS       Visit Vitals  /66   Pulse 83   Temp 97.5 °F (36.4 °C)   Resp 18   Wt 52.2 kg (115 lb)   SpO2 100%   BMI 22.46 kg/m²     Temp (24hrs), Av.8 °F (36.6 °C), Min:97.5 °F (36.4 °C), Max:98.1 °F (36.7 °C)      10/25 0701 - 10/25 1900  In: -   Out: 400 [Urine:400]  10/23 1901 - 10/25 0700  In: 650 [P.O.:650]  Out: 850 [Urine:850]      Physical Exam:  General: Frail patient with mild MCNEAL  Cardiac: Regular rate and rhythm with no murmurs. Resp: Mild dyspnea during history/exam  Extremities: 2+ Radial pulses, no cyanosis or edema    Neurological Exam:  Mental Status: Oriented to time, place and person. Speech and language intact. Attention and fund of knowledge appropriate. Normal recent and remote memory. Cranial Nerves:   VFF, EOMI, no nystagmus, no ptosis.  Facial movement is symmetric. Hearing is intact. Motor:  5/5 strength in upper extremities. Bilat HF 4-/5, bilateral PF/DF 4/5. Better inversion. Normal bulk and tone. Reflexes:   Deep tendon reflexes 2+ and symmetric in UE, absent in LE. Toes downgoing. Sensory:   Intact to LT and PP   Gait:  Not assessed   Cerebellar:  N/A         Lab Review   Recent Results (from the past 24 hour(s))   GLUCOSE, POC    Collection Time: 10/24/22  6:29 PM   Result Value Ref Range    Glucose (POC) 185 (H) 65 - 117 mg/dL    Performed by [employer], POC    Collection Time: 10/24/22  8:48 PM   Result Value Ref Range    Glucose (POC) 164 (H) 65 - 117 mg/dL    Performed by Misty Ray    MAGNESIUM    Collection Time: 10/25/22  1:44 AM   Result Value Ref Range    Magnesium 1.8 1.6 - 2.4 mg/dL   PHOSPHORUS    Collection Time: 10/25/22  1:44 AM   Result Value Ref Range    Phosphorus 3.1 2.6 - 4.7 MG/DL   GLUCOSE, POC    Collection Time: 10/25/22  6:28 AM   Result Value Ref Range    Glucose (POC) 102 65 - 117 mg/dL    Performed by [email or web address], POC    Collection Time: 10/25/22 11:32 AM   Result Value Ref Range    Glucose (POC) 143 (H) 65 - 117 mg/dL    Performed by Amaury Dillon  PCT        Additional comments:  I have reviewed the patient's new clinical lab test results.     Care Plan discussed with:  Patient x   Family    RN    Care Manager    Consultant/Specialist:       AMADO Schilling

## 2022-10-25 NOTE — DIABETES MGMT
[facility]    CLINICAL NURSE SPECIALIST CONSULT     Initial Presentation   Quinten Nair is a 69 y.o. female who presented to the ED 10/18/22 with a 2wk c/o progressive fatigue. She has become wheelchair-bound and requiring significant assistance for transfers and performing ADLs. In the ED, labs were significant for: WBC 15, , , Alk phos 169, ALT 38, Lac 4.8. UA 1000+ glucosuria, neg ketones. CXR with questionable pneumonia. Blood cx were obtained and she was admitted for medical management. HX:   Past Medical History:   Diagnosis Date    Autoimmune hepatitis ([facility] [address] 2021    GERD (gastroesophageal reflux disease)     Lupus (HCC)     lupus    PMB (postmenopausal bleeding) 08/2022    Uterine fibroid 07/03/2022        INITIAL DX:   Pneumonia [J18.9] Presumed GBS    Current Treatment     TX: Blood cx, IV antibitoics    Consulted by   Demarco Solis DO   for advanced diabetes nursing assessment and care for:   [x] Survival skill education    Hospital Course   Clinical progress has been uncomplicated. 10/18: Admission  10/19: Seen by Neurology. GBS work-up   10/21: MRI negative for acute process. LP CSF with highly elevated protein, negative for meningitis and no elevated white count. Treating for GBS with IVIG x5 days  Diabetes History   Type 2 Diabetes  Ambulatory BG management provided by: Garrick Stoner NP      Diabetes-related Medical History  Neurological complications  Peripheral neuropathy  Other associated conditions     Hyponatremia, Cirrhosis     Diabetes Medication History  Key Antihyperglycemic Medications               glimepiride (AMARYL) 1 mg tablet (Taking) Take 1 mg by mouth two (2) times a day. Patient tells me she takes 1 tablet of metformin and 2 tablets of amaryl daily. Daughter, Ania ([phone]) will confirm prescriptions and call me back to confirm.     Diabetes self-management practices:   Eating pattern   On a low salt diet  [x] Breakfast Oatmeal  [x] Lunch  [place]   [x] [employer], Air fried vegetables  [x] Bedtime Limited  [x] Snacks  Limited  [x] Beverages Water, coffee  Physical activity pattern   Weak   Now using a rollator  Monitoring pattern   Patient tells me she checks once daily: numbers 200-300s   Daughter says that she checks maybe once weekly Fasting is usually 100s and evening up to 300s     Taking medications pattern  [x] Consistent administration  [x] Affordable  Social determinants of health impacting diabetes self-management practices   Concerned that you need to know more about how to stay healthy with diabetes    Overall evaluation:    [x] Achieving A1c target with drug therapy & self-care practices      Started on oral budesonide 9mg with hepatology earlier this year by hepatologist for autoimmune hepatitis   Plan per last note: Once we have her on a stable high does of cellcept will start to taper the budesonide. And leave her on cellcept alone. Subjective   I am so tired.      Objective   Physical exam  General  Normal weight white and frail female. Resting in bed. Appears exhausted. Conversant and cooperative  Neuro  Alert, oriented   Vital Signs Visit Vitals  /66   Pulse 83   Temp 97.5 °F (36.4 °C)   Resp 18   Wt 52.2 kg (115 lb)   SpO2 100%   BMI 22.46 kg/m²     Skin  Warm and dry. No acanthosis noted along neckline. Heart   Regular rate and rhythm. No murmurs, rubs or gallops  Lungs  Clear to auscultation without rales or rhonchi  Extremities No foot wounds      Laboratory  No results for input(s): GLU, AGAP, TRIGL, WBC, CREA, GFRNA, AST, ALT in the last 72 hours.     No lab exists for component: A1C      Factors impacting BG management  Factor Dose Comments   Nutrition:  Standard meals     60 grams/meal      Infection Pneumonia  IV antibiotics    Alcoholic hepatitis  Cirrhosis       Blood glucose pattern      Significant diabetes-related events over the past 24-72 hours  A1C 8.2%  Fasting B  Pre-prandial B-185  Basal: 20 units Lantus daily  Bolus: 6 units with meals  Correction: 7 units in the last 24h  On cellcept for liver inflammation   9mg budesonide daily for autoimmune hepatitis   IVIG (Today is 4/5)    Assessment and Plan   Nursing Diagnosis Risk for unstable blood glucose pattern   Nursing Intervention Domain 5259 Decision-making Support   Nursing Interventions Examined current inpatient diabetes/blood glucose control   Explored factors facilitating and impeding inpatient management  Explored corrective strategies with patient and responsible inpatient provider   Informed patient of rational for insulin strategy while hospitalized       Evaluation   Eric Camara is a 69year old female, with Type 2 Diabetes and cirrhosis s/t autoimmune hepatitis, who is admitted with sepsis s/t community acquired pneumonia. A1C on admission was 8.2% (improved from 9.9%) with recent adjustments in oral antihyperglycemic agents a few weeks ago with her PCP. She is prescribed 9mg budesonide- starting earlier this year for treatment of autoimmune hepatitis and elevations in L5O most certainly related to steroid use. She does endorse stable fasting BG values PTA but jacinto see hyperglycemia to the 200-300s in the evenings prior to hospital admission. Her BG was elevated on admission at 325 but now fasting 106 after IV hydration. Factors impacting glucose at this time are infection/PNA, budesonide, cirrhosis and elevated A1C. She continues to have steroid induced hyperglycemia s/t budesonide despite high dose glargine and mealtime humalog. Consider switching to high dose NPH dosed once daily to offset daytime steroid impact. Recommendations   POC glucose ACHS    Consistent carbohydrate diet (60 grams CHO/meal)    3. Continue the subcutaneous Insulin Order set (2581):   Insulin Dosing Specific recommendation   Basal                                       (Based on weight, BMI & GFR) 20 units Lantus daily        If eating more than 50% of meals and pre-meal BG sustained over 180mg/dl, start Nutritional                                      (Based on CHO/dextrose load) Resistant sensitivity: 6 units Humalog/meal today. Resume 3 units/meal if switching to high dose NPH tomorrow       Corrective                                       (Useful in adjusting insulin dosing) [x] Normal sensitivity ACHS         Discharge Recommendations   Has a follow-up visit with endocrinologist: Cameron Llamas NP. [facility] 11/17/22 at 1pm. Please fax clinicals to [phone] at discharge. On Discharge, please place an outpatient order for \"diabetes education\" (enter as REF20). This will trigger a referral for the [facility] which includes outpatient diabetes self management training with a certified diabetes educator. Continue Metformin 500mg daily, Adjust amaryl to 1mg daily at breakfast.    Will need basal insulin given with each budesonide dose. Glargine PEN 20 units SQ once daily    Pen Needle, Diabetic 32 Gauge x 5/32\" (1 box)    Patient to check BG before meals and bedtime. Pleas bring BG log to endocrinology apt. Billing Code(s)   [x] 00393  Before making these care recommendations, I personally reviewed the hospitalization record, including notes, laboratory & diagnostic data and current medications, and examined the patient at the bedside (circumstances permitting) before making care recommendations. More than fifty (50) percent of the time was spent in patient counseling and/or care coordination.   Total minutes: 21      ALBERTO Stanford BC-ADM  Board Certified Advanced Diabetes Manager  Clinical Nurse Specialist  [facility]  Access via [address] [place] [address]

## 2022-10-25 NOTE — PROGRESS NOTES
Problem: Mobility Impaired (Adult and Pediatric)  Goal: *Acute Goals and Plan of Care (Insert Text)  Description: FUNCTIONAL STATUS PRIOR TO ADMISSION: Patient was modified independent using a rollator for functional mobility, h/o falls (weekly, collapses), ambulated full flight of steps to the shower. HOME SUPPORT PRIOR TO ADMISSION: The patient lived with her spouse, did not require assistance. Physical Therapy Goals  Initiated 10/19/2022  1. Patient will move from supine to sit and sit to supine  in bed with modified independence within 7 day(s). 2.  Patient will transfer from bed to chair and chair to bed with modified independence using the least restrictive device within 7 day(s). 3.  Patient will perform sit to stand with modified independence within 7 day(s). 4.  Patient will ambulate with modified independence for 50 feet with the least restrictive device within 7 day(s). Outcome: Not Progressing Towards Goal   PHYSICAL THERAPY TREATMENT  Patient: Alex Tatum (22 y.o. female)  Date: 10/25/2022  Diagnosis: Pneumonia [J18.9] <principal problem not specified>      Precautions: Fall  Chart, physical therapy assessment, plan of care and goals were reviewed. ASSESSMENT  Patient continues with skilled PT services and is not progressing towards goals. Patient is feeling weaker this afternoon. She did transfer to chair and sit up with OT earlier in the day. Infusion started, but patient cleared to participate in PT by nursing. Patient performed bed mobility to sit on edge of bed. Performed seated LE exercises (see below). Unable to perform sit-stand or gait as she became very light headed and had to lie down. Symptoms resolved after lying down. Current Level of Function Impacting Discharge (mobility/balance): Minimal-contact guard assistance for bed mobility    Other factors to consider for discharge:  Motivated/A & O x 4/Supportive Family/Far from Modified Independent PLOF PLAN :  Patient continues to benefit from skilled intervention to address the above impairments. Continue treatment per established plan of care. to address goals. Recommendation for discharge: (in order for the patient to meet his/her long term goals)  Therapy 3 hours per day 5-7 days per week    This discharge recommendation:  Has been made in collaboration with the attending provider and/or case management    IF patient discharges home will need the following DME: patient owns DME required for discharge       SUBJECTIVE:   Patient stated I am feeling weak today.     OBJECTIVE DATA SUMMARY:   Critical Behavior:  Neurologic State: Alert  Orientation Level: Oriented X4  Cognition: Appropriate decision making, Follows commands  Safety/Judgement: Good awareness of safety precautions  Functional Mobility Training:  Bed Mobility:  Rolling: Contact guard assistance  Supine to Sit: Minimum assistance; Adaptive equipment; Additional time;Bed Modified (HOB elevated/use of rail/assist for LEs and to  trunk)  Sit to Supine: Minimum assistance  Scooting: Moderate assistance;Assist x2        Transfers:  Sit to Stand: Minimum assistance  Stand to Sit: Contact guard assistance        Bed to Chair: Minimum assistance                    Balance:  Sitting: Intact  Sitting - Static: Good (unsupported)  Sitting - Dynamic: Good (unsupported)  Standing:  (unable today)  Standing - Static: Fair;Constant support  Standing - Dynamic : Fair;Constant support  Ambulation/Gait Training:                         Therapeutic Exercises:   Seated, focused ankle DF/PF  Active knee extension (LAQs)  Active hip flex (mini-marching)  Knees in-out  10x each    Pain Rating:  None reported or observed    Activity Tolerance:   Poor-performed bed mobility and seated LE exs, then became light headed, could not stand. Returned to supine. Symptoms resolved.     After treatment patient left in no apparent distress:   Supine in bed, Call bell within reach, Bed / chair alarm activated, Side rails x 3, and notified. COMMUNICATION/COLLABORATION:   The patients plan of care was discussed with: Registered nurse and Rehabilitation technician.      Mimi Araujo   Time Calculation: 20 mins

## 2022-10-25 NOTE — PROGRESS NOTES
Problem: Self Care Deficits Care Plan (Adult)  Goal: *Acute Goals and Plan of Care (Insert Text)  Description: Occupational Therapy Goals  Initiated: 10/20/2022  1. Patient will perform grooming with supervision/set-up sitting in chair within 7 day(s). 2.  Patient will perform bathing with min  A from chair within 7 day(s). 3.  Patient will perform upper body dressing and lower body dressing with min A within 7 day(s). 4.  Patient will perform toilet transfers with CG A within 7 day(s). 5.  Patient will perform all aspects of toileting with CG A within 7 day(s). FUNCTIONAL STATUS PRIOR TO ADMISSION: She lives with her . She is typically independent but has declined in the last month significantly. She is not using a walker and w/c in the home. HOME SUPPORT: lives with . Outcome: Progressing Towards Goal   OCCUPATIONAL THERAPY TREATMENT  Patient: Christina Damian (89 y.o. female)  Date: 10/25/2022  Diagnosis: Pneumonia [J18.9] <principal problem not specified>      Precautions: Fall  Chart, occupational therapy assessment, plan of care, and goals were reviewed. ASSESSMENT  Patient continues with skilled OT services and is progressing towards goals. Pt received sleeping, supine in bed, however agreeable to therapy upon waking. Pt completed bed mobility to sit EOB with Min-Mod Ax1. Completed transfer from bed to chair with RW and Min A for RW management/safety. Pt required instructions for transfer to chair (asking therapist \"what do I do now\"). Pt A&Ox4, but with some possible confusion? Pt sat in chair for ADLs (set up with some verbal cues). All needs met at end of session. Continue to recommend IPR at discharge.       Current Level of Function Impacting Discharge (ADLs): Min-Mod A for bed mobility/mobility, Mod A for LB ADLs, Set up-Min A UB ADLs    Other factors to consider for discharge: Independent at baseline          PLAN :  Patient continues to benefit from skilled intervention to address the above impairments. Continue treatment per established plan of care to address goals. Recommend with staff: OOB to chair for meals, pt participation in self care, BSC     Recommend next OT session: Standing grooming tasks    Recommendation for discharge: (in order for the patient to meet his/her long term goals)  Therapy 3 hours per day 5-7 days per week    This discharge recommendation:  Has been made in collaboration with the attending provider and/or case management    IF patient discharges home will need the following DME: tbd       SUBJECTIVE:   Patient stated I'm still having trouble with standing.     OBJECTIVE DATA SUMMARY:   Cognitive/Behavioral Status:  Neurologic State: Alert  Orientation Level: Oriented X4  Cognition: Appropriate decision making; Follows commands     Functional Mobility and Transfers for ADLs:  Bed Mobility:  Supine to Sit: Minimum assistance    Transfers:  Sit to Stand: Minimum assistance     Bed to Chair: Minimum assistance    Balance:  Sitting: Intact  Standing: Impaired  Standing - Static: Fair;Constant support  Standing - Dynamic : Fair;Constant support    ADL Intervention:       Grooming  Position Performed: Seated in chair  Brushing Teeth: Set-up  Brushing/Combing Hair: Set-up         Type of Bath: Chlorhexidine (CHG)              Lower Body Dressing Assistance  Slip on Shoes with Back: Moderate assistance         Pain: At R hip/back with mobility     Activity Tolerance:   Fair    After treatment patient left in no apparent distress:   Sitting in chair and Call bell within reach    COMMUNICATION/COLLABORATION:   The patients plan of care was discussed with: Registered nurse.      Haseeb Burr OT  Time Calculation: 23 mins

## 2022-10-25 NOTE — PROGRESS NOTES
Hospitalist Progress Note    NAME: Ifrah Us   :  1945   MRN:  [id number]   Room Number:  600/59  @ [address]     Please note that this dictation was completed with Dontrell Quiroz, the computer voice recognition software. Quite often unanticipated grammatical, syntax, homophones, and other interpretive errors are inadvertently transcribed by the computer software. Please disregard these errors. Please excuse any errors that have escaped final proofreading. Interim Hospital Summary: 69 y.o. female whom presented on 10/18/2022 with    78-year-old  female with past medical history significant for autoimmune hepatitis with cirrhosis, history of systemic lupus erythematosus, chronic immunosuppression, secondary diabetes mellitus who happened to be in her usual state of health until 2 weeks ago, when she started feeling very weak and fatigued, especially weak in the lower extremity with difficulty standing up and using her walker, to a point where she can hardly walk, all of this after a Flu shot    Patient was admitted for Pneumonia     Assessment / Plan:  Anticipated discharge date : TBD  Anticipated disposition : IPR  Barriers to discharge : medical stability       #Suspected Guillain-Barré syndrome POA  #Progressive lower extremity weakness due to above  #Absent LE reflexes  Due To above  -MRI lumbar spine with L2-L3 disc extrusion with moderate spinal and left foraminal stenosis.   L4-L5 severe spinal stenosis  -Lumbar puncture 10/20/2022 with elevated protein  -Assessed by neurology and started on IVIG    -Continue IVIG per neurology  -PT OT    #Community-acquired pneumonia POA resolved  -Patient completed course of antibiotic for 5 days    #History of autoimmune hepatitis with liver cirrhosis POA  #History of systemic lupus erythematosus POA  -Continue home regimen budesonide and CellCept    #Type 2 diabetes mellitus  - NPH  - Lispro correctional scale, FSG AC HS  - Consistent carb diet, hypoglycemia protocol. #History of GERD  #Hypertension  - cont PPI and Aldactone            Code status: Full  Prophylaxis: Lovenox  Recommended Disposition: SNF/LTC     Subjective:     Chief Complaint / Reason for Physician Visit  Can wiggle her toes today   Discussed with RN events overnight. Review of Systems:  No fevers, chills, appetite change, cough, sputum production, shortness of breath, dyspnea on exertion, nausea, vomitting, diarrhea, constipation, chest pain, leg edema, abdominal pain, joint pain, rash, itching. . Tolerating diet. Objective:     VITALS:   Last 24hrs VS reviewed since prior progress note. Most recent are:  Patient Vitals for the past 24 hrs:   Temp Pulse Resp BP SpO2   10/25/22 0740 97.5 °F (36.4 °C) 83 18 136/66 100 %   10/25/22 0006 98.1 °F (36.7 °C) 91 -- 125/79 99 %   10/24/22 2053 97.7 °F (36.5 °C) 96 17 (!) 149/84 99 %   10/24/22 1553 97.7 °F (36.5 °C) 98 18 119/87 99 %       Intake/Output Summary (Last 24 hours) at 10/25/2022 0834  Last data filed at 10/24/2022 1630  Gross per 24 hour   Intake 650 ml   Output 850 ml   Net -200 ml        PHYSICAL EXAM:  General: WD, WN. Alert, cooperative, no acute distress    EENT:  EOMI. Anicteric sclerae. MMM  Resp:  CTA bilaterally, no wheezing or rales. No accessory muscle use  CV:  Regular  rhythm,  normal S1/S2, no murmurs rubs gallops, No edema  GI:  Soft, Non distended, Non tender. +Bowel sounds  Neurologic:  Alert and oriented X 3, normal speech,   Psych:   Good insight. Not anxious nor agitated  Skin:  No rashes.   No jaundice    Reviewed most current lab test results and cultures  YES  Reviewed most current radiology test results   YES  Review and summation of old records today    NO  Reviewed patient's current orders and MAR    YES  PMH/SH reviewed - no change compared to H&P  ________________________________________________________________________  Care Plan discussed with:    Comments   Patient x    Family RN x    Care Manager x    Consultant  x                     x Multidiciplinary team rounds were held today with , nursing, pharmacist and clinical coordinator. Patient's plan of care was discussed; medications were reviewed and discharge planning was addressed. ________________________________________________________________________  Total NON critical care TIME:  35   Minutes    Total CRITICAL CARE TIME Spent:   Minutes non procedure based      Comments   >50% of visit spent in counseling and coordination of care x    ________________________________________________________________________  Fili Steward MD     Procedures: see electronic medical records for all procedures/Xrays and details which were not copied into this note but were reviewed prior to creation of Plan. LABS:  I reviewed today's most current labs and imaging studies. Pertinent labs include:  No results for input(s): WBC, HGB, HCT, PLT, HGBEXT, HCTEXT, PLTEXT, HGBEXT, HCTEXT, PLTEXT in the last 72 hours.     Recent Labs     10/25/22  0144 10/24/22  0317 10/23/22  0439   MG 1.8 1.9 1.8   PHOS 3.1 1.9* 2.3*       Signed: Fili Steward MD

## 2022-10-26 NOTE — PROGRESS NOTES
Problem: Self Care Deficits Care Plan (Adult)  Goal: *Acute Goals and Plan of Care (Insert Text)  Description: Occupational Therapy Goals  Initiated: 10/20/2022  1. Patient will perform grooming with supervision/set-up sitting in chair within 7 day(s). 2.  Patient will perform bathing with min  A from chair within 7 day(s). 3.  Patient will perform upper body dressing and lower body dressing with min A within 7 day(s). 4.  Patient will perform toilet transfers with CG A within 7 day(s). 5.  Patient will perform all aspects of toileting with CG A within 7 day(s). FUNCTIONAL STATUS PRIOR TO ADMISSION: She lives with her . She is typically independent but has declined in the last month significantly. She is not using a walker and w/c in the home. HOME SUPPORT: lives with . Outcome: Progressing Towards Goal   OCCUPATIONAL THERAPY TREATMENT  Patient: Nicole Hernadez (26 y.o. female)  Date: 10/26/2022  Diagnosis: Pneumonia [J18.9] <principal problem not specified>      Precautions: Fall  Chart, occupational therapy assessment, plan of care, and goals were reviewed. ASSESSMENT  Patient continues with skilled OT services and is progressing towards goals. Pt's performance of ADL/IADL tasks continues to be limited at this time by impaired balance, activity tolerance, BLE weakness/impaired sensation/edema, and decreased LB reach. Pt received supine and agreeable to participation in therapy, however expressing anxiousness regarding completion of OOB mobility. She demonstrated ability to assist in donning socks at EOB with max A using modified tailor sitting technique. Full tailor sitting limited at this time by significant BLE edema. Pt progressed to bedside chair with mod A x1 and RW. Moderate verbal cues required for safe use of RW within environment. Once seated, basic grooming task performed with set-up. Continue to recommend d/c to I[facility].       Current Level of Function Impacting Discharge (ADLs): min to mod A functional transfers in prep for ADL tasks, max A LB dressing, set-up seated grooming     Other factors to consider for discharge: PLOF         PLAN :  Patient continues to benefit from skilled intervention to address the above impairments. Continue treatment per established plan of care to address goals. Recommendation for discharge: (in order for the patient to meet his/her long term goals)  Therapy 3 hours per day 5-7 days per week    This discharge recommendation:  Has been made in collaboration with the attending provider and/or case management    IF patient discharges home will need the following DME: TBD       SUBJECTIVE:   Patient stated I'm scared to get up and move, but I want to get better.     OBJECTIVE DATA SUMMARY:   Cognitive/Behavioral Status:  Neurologic State: Alert  Orientation Level: Oriented X4  Cognition: Follows commands  Perception: Appears intact  Perseveration: No perseveration noted  Safety/Judgement: Awareness of environment    Functional Mobility and Transfers for ADLs:  Bed Mobility:  Supine to Sit: Moderate assistance;Assist x1;Additional time;Bed Modified  Scooting: Moderate assistance (to EOB)    Transfers:  Sit to Stand: Minimum assistance     Bed to Chair: Moderate assistance;Assist x1;Adaptive equipment (RW)    Balance:  Sitting: Impaired  Sitting - Static: Good (unsupported)  Sitting - Dynamic: Good (unsupported); Fair (occasional)  Standing: Impaired; With support  Standing - Static: Good;Fair;Constant support  Standing - Dynamic : Fair;Constant support    ADL Intervention:       Grooming  Grooming Assistance: Set-up  Position Performed: Seated in chair  Brushing Teeth: Set-up         Type of Bath: Chlorhexidine (CHG)              Lower Body Dressing Assistance  Socks: Maximum assistance  Leg Crossed Method Used: No (pt unable)  Position Performed: Seated edge of bed  Cues: Verbal cues provided;Physical assistance         Cognitive Retraining  Safety/Judgement: Awareness of environment    Pain:  None reported     Activity Tolerance:   Good and Fair    After treatment patient left in no apparent distress:   Sitting in chair and Call bell within reach    COMMUNICATION/COLLABORATION:   The patients plan of care was discussed with: Registered nurse.      ANGELES Luo, OTR/L  Time Calculation: 23 mins

## 2022-10-26 NOTE — PROGRESS NOTES
Attempted to get blood from midline catheter with no success. Looked at other arm did not see or feel any veins to stick for AM labs.   Unable to get labs at this time,

## 2022-10-26 NOTE — DIABETES MGMT
[address] [facility]    CLINICAL NURSE SPECIALIST CONSULT     Initial Presentation   Ale Jeong is a 69 y.o. female who presented to the ED 10/18/22 with a 2wk c/o progressive fatigue. She has become wheelchair-bound and requiring significant assistance for transfers and performing ADLs. In the ED, labs were significant for: WBC 15, , , Alk phos 169, ALT 38, Lac 4.8. UA 1000+ glucosuria, neg ketones. CXR with questionable pneumonia. Blood cx were obtained and she was admitted for medical management. HX:   Past Medical History:   Diagnosis Date    Autoimmune hepatitis ([facility] [address] 2021    GERD (gastroesophageal reflux disease)     Lupus (HCC)     lupus    PMB (postmenopausal bleeding) 08/2022    Uterine fibroid 07/03/2022        INITIAL DX:   Pneumonia [J18.9] Presumed GBS    Current Treatment     TX: Blood cx, IV antibitoics    Consulted by   Demarco Solis DO   for advanced diabetes nursing assessment and care for:   [x] Survival skill education    Hospital Course   Clinical progress has been uncomplicated. 10/18: Admission  10/19: Seen by Neurology. GBS work-up   10/21: MRI negative for acute process. LP CSF with highly elevated protein, negative for meningitis and no elevated white count. Treating for GBS with IVIG x5 days  Diabetes History   Type 2 Diabetes  Ambulatory BG management provided by: Mela Santos NP      Diabetes-related Medical History  Neurological complications  Peripheral neuropathy  Other associated conditions     Hyponatremia, Cirrhosis     Diabetes Medication History  Key Antihyperglycemic Medications               glimepiride (AMARYL) 1 mg tablet (Taking) Take 1 mg by mouth two (2) times a day. Patient tells me she takes 1 tablet of metformin and 2 tablets of amaryl daily. Daughter, Akila Dave ([phone]) will confirm prescriptions and call me back to confirm.     Diabetes self-management practices:   Eating pattern   On a low salt diet  [x] Breakfast Oatmeal  [x] Lunch  [place]   [x] [employer], Air fried vegetables  [x] Bedtime Limited  [x] Snacks  Limited  [x] Beverages Water, coffee  Physical activity pattern   Weak   Now using a rollator  Monitoring pattern   Patient tells me she checks once daily: numbers 200-300s   Daughter says that she checks maybe once weekly Fasting is usually 100s and evening up to 300s     Taking medications pattern  [x] Consistent administration  [x] Affordable  Social determinants of health impacting diabetes self-management practices   Concerned that you need to know more about how to stay healthy with diabetes    Overall evaluation:    [x] Achieving A1c target with drug therapy & self-care practices      Started on oral budesonide 9mg with hepatology earlier this year by hepatologist for autoimmune hepatitis   Plan per last note: Once we have her on a stable high does of cellcept will start to taper the budesonide. And leave her on cellcept alone. Subjective   I am so tired.      Objective   Physical exam  General  Normal weight white and frail female. Resting in bed. Appears exhausted. Conversant and cooperative  Neuro  Alert, oriented   Vital Signs Visit Vitals  /77   Pulse 96   Temp 98.3 °F (36.8 °C)   Resp 16   Wt 52.2 kg (115 lb)   SpO2 100%   BMI 22.46 kg/m²     Skin  Warm and dry. No acanthosis noted along neckline. Heart   Regular rate and rhythm. No murmurs, rubs or gallops  Lungs  Clear to auscultation without rales or rhonchi  Extremities No foot wounds      Laboratory  No results for input(s): GLU, AGAP, TRIGL, WBC, CREA, GFRNA, AST, ALT in the last 72 hours.     No lab exists for component: A1C      Factors impacting BG management  Factor Dose Comments   Nutrition:  Standard meals     60 grams/meal      Infection Pneumonia  IV antibiotics    Alcoholic hepatitis  Cirrhosis       Blood glucose pattern      Significant diabetes-related events over the past 24-72 hours  A1C 8.2%  Fasting B  Pre-prandial B-247  Basal: 20 units Lantus daily  Bolus: 6 units with meals  Correction: 9 units in the last 24h  On cellcept for liver inflammation   9mg budesonide daily for autoimmune hepatitis   IVIG (Today is 4/)    Assessment and Plan   Nursing Diagnosis Risk for unstable blood glucose pattern   Nursing Intervention Domain 5257 Decision-making Support   Nursing Interventions Examined current inpatient diabetes/blood glucose control   Explored factors facilitating and impeding inpatient management  Explored corrective strategies with patient and responsible inpatient provider   Informed patient of rational for insulin strategy while hospitalized       Evaluation   Fenrando Knutson is a 69year old female, with Type 2 Diabetes and cirrhosis s/t autoimmune hepatitis, who is admitted with sepsis s/t community acquired pneumonia. A1C on admission was 8.2% (improved from 9.9%) with recent adjustments in oral antihyperglycemic agents a few weeks ago with her PCP. She is prescribed 9mg budesonide- starting earlier this year for treatment of autoimmune hepatitis and elevations in F4G most certainly related to steroid use. She does endorse stable fasting BG values PTA but does see hyperglycemia to the 200-300s in the evenings prior to hospital admission. Factors impacting glucose at this time are infection/PNA, budesonide, cirrhosis and elevated A1C. She continues to have steroid induced hyperglycemia s/t budesonide despite high dose glargine and mealtime humalog. Doses have been adjusted to target inpatient BG goal of 140-180mg/dl. Recommendations   POC glucose ACHS    Consistent carbohydrate diet (60 grams CHO/meal)    3. Continue the subcutaneous Insulin Order set (2499):   Insulin Dosing Specific recommendation   Basal                                       (Based on weight, BMI & GFR) 20 units Lantus daily        If eating more than 50% of meals and pre-meal BG sustained over 180mg/dl, start Nutritional                                      (Based on CHO/dextrose load) Resistant sensitivity: 6 units Humalog/meal today. Resume 3 units/meal if switching to high dose NPH tomorrow       Corrective                                       (Useful in adjusting insulin dosing) Resistant sensitivity ACHS         Discharge Recommendations   Has a follow-up visit with endocrinologist: Roosevelt Tipton NP. [facility] 11/17/22 at 1pm. Please fax clinicals to [phone] at discharge. On Discharge, please place an outpatient order for \"diabetes education\" (enter as REF20). This will trigger a referral for the Program for Diabetes [facility] which includes outpatient diabetes self management training with a certified diabetes educator. Continue Metformin 500mg daily, Adjust amaryl to 1mg daily at breakfast.    Will need basal insulin given with each budesonide dose. Glargine PEN 20 units SQ once daily. Pen Needle, Diabetic 32 Gauge x 5/32\" (1 box)    Patient to check BG before meals and bedtime. Please bring BG log to endocrinology apt. Billing Code(s)   [x] 30377  Before making these care recommendations, I personally reviewed the hospitalization record, including notes, laboratory & diagnostic data and current medications, and examined the patient at the bedside (circumstances permitting) before making care recommendations. More than fifty (50) percent of the time was spent in patient counseling and/or care coordination.   Total minutes: 21      ALBERTO Deleon BC-ADM  Board Certified Advanced Diabetes Manager  Clinical Nurse Specialist  [facility]  Access via [facility]

## 2022-10-26 NOTE — PROGRESS NOTES
Neurology Progress Note     NAME: Jhonathan Rios   :  1945   MRN:  [id number]   DATE:  10/26/2022    Assessment:     Active Problems:    Pneumonia (10/18/2022)    Pt is a 77yo right-handed female with DM, autoimmune hepatitis with cirrhosis, SLE on chronic immunosuppression with Cellcept, admitted 10/18/22 due to severe weakness in LE preventing her from walking. She was found on admission to a leukocytosis and lactic acidosis, CXR with minimal bibasilar opacities concerning for PNA, started on Zithromax and Rocephin. She feels her symptoms worsened after receiving the flu vaccine 4 weeks ago. She underwent MRI L-spine w/wo contrast 10/19/22 without mention of nerve root enhancement, does have degenerative changes with mod to severe central stenosis. LP 10/20/22 with cytoalbuminologic dissociation with Pro 107. Exam today: Patient has no SOB. BUE 5/5, BLE bilat HF 4-/5, KF/E 4-/5, DF/PF 4/5, better inversion, LLE has 1+ knee patellar reflex otherwise arreflexic  IgA level 454  Plan:   **Presumed GBS  - Suspect GBS given progressive LE weakness after flu vaccine with absent LE reflexes and elevated CSF protein. - MRI L/S-spine showed no pathology as cause  - Continue IVIG 0.4g/kg daily x 5 days (today is day 55)  - continue PT/OT with rec IPR  - We will re-examine the patient tomorrow if patient remains here otherwise stable for transfer when medically stable after last IVIG    The patient does not need to follow-up in the Neurology clinic after discharge. Subjective:   SOB is better and she is off N/C. Patient feels her LE symptoms have improved as well.      Objective:   Chart reviewed since last seen    Current Facility-Administered Medications   Medication Dose Route Frequency    insulin lispro (HUMALOG) injection 6 Units  6 Units SubCUTAneous TID WITH MEALS    insulin glargine (LANTUS) injection 20 Units  20 Units SubCUTAneous DAILY    phosphorus (K PHOS NEUTRAL) 250 mg tablet 1 Tablet  1 Tablet Oral BID    dextrose 10 % infusion 0-250 mL  0-250 mL IntraVENous PRN    immune globulin 10% (GAMUNEX-C) infusion 15 g  15 g IntraVENous Q24H    melatonin tablet 6 mg  6 mg Oral QHS PRN    glucose chewable tablet 16 g  4 Tablet Oral PRN    glucagon (GLUCAGEN) injection 1 mg  1 mg IntraMUSCular PRN    insulin lispro (HUMALOG) injection   SubCUTAneous AC&HS    traMADoL (ULTRAM) tablet 50 mg  50 mg Oral Q6H PRN    sodium chloride (NS) flush 5-40 mL  5-40 mL IntraVENous Q8H    sodium chloride (NS) flush 5-40 mL  5-40 mL IntraVENous PRN    acetaminophen (TYLENOL) tablet 650 mg  650 mg Oral Q6H PRN    Or    acetaminophen (TYLENOL) suppository 650 mg  650 mg Rectal Q6H PRN    polyethylene glycol (MIRALAX) packet 17 g  17 g Oral DAILY PRN    ondansetron (ZOFRAN ODT) tablet 4 mg  4 mg Oral Q8H PRN    Or    ondansetron (ZOFRAN) injection 4 mg  4 mg IntraVENous Q6H PRN    budesonide (ENTOCORT EC) capsule 9 mg  9 mg Oral DAILY    mycophenolate mofetil (CELLCEPT) capsule 500 mg  500 mg Oral ACB&D    pantoprazole (PROTONIX) tablet 40 mg  40 mg Oral DAILY    spironolactone (ALDACTONE) tablet 100 mg  100 mg Oral DAILY    0.9% sodium chloride infusion  100 mL/hr IntraVENous CONTINUOUS       Visit Vitals  /77   Pulse 96   Temp 98.3 °F (36.8 °C)   Resp 16   Wt 52.2 kg (115 lb)   SpO2 100%   BMI 22.46 kg/m²     Temp (24hrs), Av.8 °F (36.6 °C), Min:97.4 °F (36.3 °C), Max:98.3 °F (36.8 °C)      No intake/output data recorded. 10/24 1901 - 10/26 0700  In: 800 [P.O.:800]  Out: 1250 [Urine:1250]      Physical Exam:  General: Frail patient with mild MCNEAL  Cardiac: Regular rate and rhythm with no murmurs. Resp: Mild dyspnea during history/exam  Extremities: 2+ Radial pulses, no cyanosis or edema    Neurological Exam:  Mental Status: Oriented to time, place and person.  Speech and language intact. Attention and fund of knowledge appropriate. Normal recent and remote memory. Cranial Nerves:   VFF, EOMI, no nystagmus, no ptosis. Facial movement is symmetric. Hearing is intact. Motor:  5/5 strength in upper extremities. Bilat HF 4-/5, bilateral PF/DF 4/5. Better inversion. Normal bulk and tone. Reflexes:   Deep tendon reflexes 2+ and symmetric in UE, RLE 1+. Toes downgoing. Sensory:   Intact to LT and PP   Gait:  Not assessed   Cerebellar:  N/A         Lab Review   Recent Results (from the past 24 hour(s))   GLUCOSE, POC    Collection Time: 10/25/22  4:44 PM   Result Value Ref Range    Glucose (POC) 205 (H) 65 - 117 mg/dL    Performed by Isael Robbins, POC    Collection Time: 10/25/22  9:47 PM   Result Value Ref Range    Glucose (POC) 247 (H) 65 - 117 mg/dL    Performed by Justina William    GLUCOSE, POC    Collection Time: 10/26/22  7:28 AM   Result Value Ref Range    Glucose (POC) 164 (H) 65 - 117 mg/dL    Performed by Tong Jensen        Additional comments:  I have reviewed the patient's new clinical lab test results. There are no new images to review.     Care Plan discussed with:  Patient x   Family    RN    Care Manager    Consultant/Specialist:       AMADO Haile

## 2022-10-26 NOTE — PROGRESS NOTES
Chart reviewed and cleared by RN for PT weekly re-assessment. Upon entering room pt observed semi delvalle in bed, pleasant, conversational. Pt stated she had recently returned from sitting up in bedside chair and politely declining further mobilization at his time. Pt with scheduled d/c to [facility] with [facility] transport arranged for 10/27.     Alis Shelby PT, DPT

## 2022-10-26 NOTE — PROGRESS NOTES
Hospitalist Progress Note    NAME: Anna Aldridge   :  1945   MRN:  [id number]   Room Number:  754/13  @ [address]     Please note that this dictation was completed with Andie Sit, the computer voice recognition software. Quite often unanticipated grammatical, syntax, homophones, and other interpretive errors are inadvertently transcribed by the computer software. Please disregard these errors. Please excuse any errors that have escaped final proofreading. Interim Hospital Summary: 69 y.o. female whom presented on 10/18/2022 with    67-year-old  female with past medical history significant for autoimmune hepatitis with cirrhosis, history of systemic lupus erythematosus, chronic immunosuppression, secondary diabetes mellitus who happened to be in her usual state of health until 2 weeks ago, when she started feeling very weak and fatigued, especially weak in the lower extremity with difficulty standing up and using her walker, to a point where she can hardly walk, all of this after a Flu shot    Patient was admitted for Pneumonia     Assessment / Plan:  Anticipated discharge date : 10/27  Anticipated disposition : I[facility]  Barriers to discharge : medical stability. I was told that patient will complete last dose of IVIG around 6 PM today and patient and her family requested not to be discharged tonight. #Suspected Guillain-Barré syndrome POA  #Progressive lower extremity weakness due to above  #Absent LE reflexes  Due To above  -MRI lumbar spine with L2-L3 disc extrusion with moderate spinal and left foraminal stenosis.   L4-L5 severe spinal stenosis  -Lumbar puncture 10/20/2022 with elevated protein  -Assessed by neurology and started on IVIG day 5    -Continue IVIG per neurology day 5 today  -PT OT recommending inpatient rehab    #Community-acquired pneumonia POA resolved  -Patient completed course of antibiotic for 5 days    #History of autoimmune hepatitis with liver cirrhosis POA  #History of systemic lupus erythematosus POA  -Continue home regimen budesonide and CellCept    #Type 2 diabetes mellitus  - NPH  - Lispro correctional scale, FSG AC HS  - Consistent carb diet, hypoglycemia protocol. #History of GERD  #Hypertension  - cont PPI and Aldactone            Code status: Full  Prophylaxis: Lovenox  Recommended Disposition: SNF/LTC     Subjective:     Patient was seen and examined. No acute events overnight. Discussed with RN overnight events. All patient's questions were answered. \"Doing very well and slept very good last night\"    Review of Systems:  Symptom Y/N Comments  Symptom Y/N Comments   Fever/Chills n   Chest Pain n    Poor Appetite    Edema     Cough n   Abdominal Pain n    Sputum    Joint Pain     SOB/MCNEAL n   Pruritis/Rash     Nausea/vomit n   Tolerating PT/OT     Diarrhea    Tolerating Diet y    Constipation    Other       Could NOT obtain due to:            Objective:     VITALS:   Last 24hrs VS reviewed since prior progress note. Most recent are:  Patient Vitals for the past 24 hrs:   Temp Pulse Resp BP SpO2   10/26/22 0905 -- 96 -- 133/77 --   10/26/22 0714 98.3 °F (36.8 °C) 84 16 133/84 100 %   10/26/22 0219 -- -- -- -- 98 %   10/26/22 0025 97.5 °F (36.4 °C) 88 16 132/78 99 %   10/25/22 2120 97.4 °F (36.3 °C) 88 18 135/82 100 %   10/25/22 1546 98 °F (36.7 °C) 87 18 127/77 99 %         Intake/Output Summary (Last 24 hours) at 10/26/2022 1327  Last data filed at 10/25/2022 1957  Gross per 24 hour   Intake 800 ml   Output 850 ml   Net -50 ml          PHYSICAL EXAM:  General: WD, WN. Alert, cooperative, no acute distress    EENT:  EOMI. Anicteric sclerae. MMM  Resp:  CTA bilaterally, no wheezing or rales. No accessory muscle use  CV:  Regular  rhythm,  normal S1/S2, no murmurs rubs gallops, No edema  GI:  Soft, Non distended, Non tender. +Bowel sounds  Neurologic:  EOMs intact. No facial asymmetry. No aphasia or slurred speech.  Symmetrical strength, Sensation grossly intact. Alert and oriented X 4.     Psych:   Good insight. Not anxious nor agitated  Skin:  No rashes. No jaundice    Reviewed most current lab test results and cultures  YES  Reviewed most current radiology test results   YES  Review and summation of old records today    NO  Reviewed patient's current orders and MAR    YES  PMH/SH reviewed - no change compared to H&P  ________________________________________________________________________  Care Plan discussed with:    Comments   Patient x    Family      RN x    Care Manager     Consultant                        Multidiciplinary team rounds were held today with , nursing, pharmacist and clinical coordinator. Patient's plan of care was discussed; medications were reviewed and discharge planning was addressed. ________________________________________________________________________      Comments   >50% of visit spent in counseling and coordination of care x    ________________________________________________________________________  Wali Car MD     Procedures: see electronic medical records for all procedures/Xrays and details which were not copied into this note but were reviewed prior to creation of Plan. LABS:  I reviewed today's most current labs and imaging studies. Pertinent labs include:  No results for input(s): WBC, HGB, HCT, PLT, HGBEXT, HCTEXT, PLTEXT, HGBEXT, HCTEXT, PLTEXT in the last 72 hours.     Recent Labs     10/25/22  0144 10/24/22  0317   MG 1.8 1.9   PHOS 3.1 1.9*         Signed: Wali Car MD

## 2022-10-26 NOTE — PROGRESS NOTES
RUR: 11% Low     EYAD: IPR, [facility] has accepted. [facility] scheduled for tomorrow, Thursday, 10/27 @ 11:30AM. Follow-up with PCP/specialist.      Primary Contact: Daughter, Mona Estrada, [phone] or , Jennifer Rodriguez, [phone]     Medicare pt's daughter has received, reviewed, and provided verbal signature of 2nd IM letter informing them of their right to appeal the discharge. Signed copied has been placed on pt bedside chart. 11:50AM - CM reviewed chart. Per review and ID rounds, patient to complete IVIG treatment today, 10/26. Per conversation with attending, patient will discharge to [facility] tomorrow, Thursday, 10/27. [facility] scheduled for tomorrow @ 11:30AM. Discharge packet and ambulance form to be completed and placed on patient's hard chart tomorrow. EMTALA form will be completed tomorrow once able to obtain information from [facility] Liaison. CM provided update to attending via [facility]. CM provided update to patient's daughter via phone. CM reviewed 2nd IM letter with daughter via phone and left copy of IM letter in patient's room. Sticky note left on IM letter indicating for letter to not be removed from room. CM provided update to patient at bedside while she was working with therapy. CM will continue to follow as needed.     Rosaline Estrada, MSHEMANT   [phone]

## 2022-10-26 NOTE — PROGRESS NOTES
Comprehensive Nutrition Assessment    Type and Reason for Visit: Initial LOS    Nutrition Recommendations/Plan:   Continue 4 carb choice  Add Glucerna x 1 per day. Malnutrition Assessment:  Malnutrition Status: Moderate malnutrition (10/26/22 1441)    Context:  Chronic illness     Findings of the 6 clinical characteristics of malnutrition:   Energy Intake:  No significant decrease in energy intake  Weight Loss:  Greater than 10% over 6 months     Body Fat Loss:  No significant body fat loss,     Muscle Mass Loss:  Mild muscle mass loss,    Fluid Accumulation:  No significant fluid accumulation,     Strength:  Not performed     Nutrition Assessment:         Pt admitted with Pneumonia [J18.9]    Past Medical History:   Diagnosis Date    Autoimmune hepatitis ([facility] [address] 2021    GERD (gastroesophageal reflux disease)     Lupus (HCC)     lupus    PMB (postmenopausal bleeding) 08/2022    Uterine fibroid 07/03/2022     Pt screened for LOS. Pt reports significant wt loss - reported she was close to 150 lbs but lost it \"because of the liver. \" Pt reports current weight of 112 lbs says wt gain from 102 lbs- no weights taken since 10/19 - that was 115 lbs up from 107 lbs per chart. Pt reports good appetite, no n/v, no chew/swallow difficulties. Pt reports eating well and family bringing snacks. Pt requesting ONS for PM snack. As pt is supposed to D/C tomorrow, added ONS to dinner tonight. No known food allergies. Will continue to follow if pt does not d/c.            Last BM: 10/24/22, Soft    PO intake: Patient Vitals for the past 168 hrs:   % Diet Eaten   10/21/22 1553 51 - 75%   10/21/22 1027 51 - 75%       Wt Readings from Last 30 Encounters:   10/19/22 52.2 kg (115 lb)   09/22/22 48.9 kg (107 lb 12.8 oz)   09/07/22 49.4 kg (109 lb)   09/03/22 49.4 kg (109 lb)   07/19/22 50.3 kg (111 lb)   06/24/22 64 kg (141 lb)   06/10/22 65.8 kg (145 lb)   01/07/22 61.1 kg (134 lb 11.2 oz)   12/07/21 58.5 kg (129 lb)   11/09/21 59 kg (130 lb)           Nutrition Related Findings:      Wound Type: None    Current Nutrition Intake & Therapies:        ADULT DIET Regular; 4 carb choices (60 gm/meal)  ADULT ORAL NUTRITION SUPPLEMENT Dinner; Diabetic Supplement    Anthropometric Measures:  Height: 5' (152.4 cm)  Ideal Body Weight (IBW): 100 lbs (45 kg)     Current Body Wt:  52.2 kg (115 lb 1.3 oz), 115.1 % IBW.  Not specified  Current BMI (kg/m2): 22.5        Weight Adjustment: No adjustment                 BMI Category: Normal weight (BMI 22.0-24.9) age over 72    Estimated Daily Nutrient Needs:  Energy Requirements Based On: Kcal/kg  Weight Used for Energy Requirements: Current  Energy (kcal/day): 1566  Weight Used for Protein Requirements: Current  Protein (g/day): 68  Method Used for Fluid Requirements: 1 ml/kcal  Fluid (ml/day): 1550    Nutrition Diagnosis:   Increased nutrient needs related to increased demand for energy/nutrients as evidenced by  (nerve damage)    Nutrition Interventions:   Food and/or Nutrient Delivery: Continue current diet  Nutrition Education/Counseling: No recommendations at this time  Coordination of Nutrition Care: Interdisciplinary rounds, Continue to monitor while inpatient       Goals:     Goals: PO intake 50% or greater, by next RD assessment       Nutrition Monitoring and Evaluation:   Behavioral-Environmental Outcomes: Beliefs and attitudes  Food/Nutrient Intake Outcomes: Food and nutrient intake, Supplement intake  Physical Signs/Symptoms Outcomes: Biochemical data, Weight    Discharge Planning:    Continue oral nutrition supplement    Avery Spencer, [phone]: [phone]

## 2022-10-27 PROBLEM — G61.0 GUILLAIN BARRÉ SYNDROME (HCC): Status: ACTIVE | Noted: 2022-01-01

## 2022-10-27 NOTE — DISCHARGE INSTRUCTIONS
Discharge SNF/Rehab Instructions/LTAC       PATIENT ID: Anna Aldridge  MRN: [id number]   YOB: 1945    DATE OF ADMISSION: 10/18/2022   DATE OF DISCHARGE: 10/27/2022    PRIMARY CARE PROVIDER: Tonya Gusman NP    ATTENDING PHYSICIAN: Tracy Mcgee MD  DISCHARGING PROVIDER: Hannah Mallory PA-C     To contact this individual call [phone] and ask the  to page. If unavailable ask to be transferred the Adult Hospitalist Department. CONSULTATIONS: Diabetes, Neurology    PROCEDURES/SURGERIES: * No surgery found *    ADMITTING [address] [place] [address] COURSE:   Anna Aldridge is a 69 y.o. female with T2DM, autoimmune hepatitis with cirrhosis, SLE on chronic immunosuppression with Cellcept, admitted 10/18/22 due to severe weakness in LE preventing her from walking. On admission she was found to have a leukocytosis and lactic acidosis, CXR with minimal bibasilar opacities concerning for PNA, started on Zithromax and Rocephin for which she completed a 5 day course during the admission. Of note, her symptoms of LE weakness have been progressive since receiving the flu vaccine ~ 4 weeks PTA. She underwent MRI L-spine w/wo contrast 10/19/22 without mention of nerve root enhancement, does have degenerative changes with mod to severe central stenosis. LP 10/20/22 with cytoalbuminologic dissociation with Pro 107. She was diagnosed with suspected Guillain-Wittman Syndrome by our Neurology team due to progressive LE weakness after flu vaccine with absent LE reflexes and elevated CSF protein with MRI L/S spine revealing no pathology. She was started on IVIG 0.4g/kg daily x 5 days and completed the course on 10/26/22. She worked with PT/OT during hospitalization and after receiving the IVIG her LE weakness was improving. She was medically cleared for discharge to [facility] ([facility]) on 10/27/22.  I reviewed her case with neurology and that she developed new neuropathic pain in bilateral thighs and recommended initiating on Lyrica daily and to further uptitrate as an outpatient. Prior to discharge I discussed with her daughter, Sarah Villalba, who was coming to the hospital and in agreement with plan. For discharge pain medications she was given a paper script for Tramadol 50mg q6h PRN with #12# tablets and 0 refills (had not required dosing for multiple days prior to discharge) and Lyrica 50mg daily for neuropathic pain with #30# tablets and 0 refills (paperscript). I reviewed her home medications with her daughter NEY and the difference between our list is that she also takes Metformin 500mg daily and no longer takes the Hydroxychloroquine as the patient self discontinued and never restarted. [facility] Attending MD, Dr. Joe Garcia, was notified and in agreement with plan. DISCHARGE DIAGNOSES / PLAN:      #Suspected Guillain-Barré syndrome POA  #Progressive lower extremity weakness due to above  #Absent LE reflexes  Due To above  -MRI lumbar spine with L2-L3 disc extrusion with moderate spinal and left foraminal stenosis. L4-L5 severe spinal stenosis  -Lumbar puncture 10/20/2022 with elevated protein ()  -Assessed by neurology and started on IVIG day 5/5 and completed course on 10/27/22  -PT OT recommending inpatient rehab and ultimately discharged on 10/27/22 to [facility]  -- Neuropathic pain: Tramadol 50mg q6h PRN for pain (#12 tablets and 0 refills, paper script) and Lyrica 50mg daily (#30# tablets and 0 refills, paper script). [facility].  Safety instructions with opioids completed  -- Per Neurology does not require follow-up in their clinic     #Community-acquired pneumonia POA resolved  -Patient completed course of antibiotic (CTX and Zithromycin)  x 5 days during hospitalization    #History of autoimmune hepatitis with liver cirrhosis POA  #History of systemic lupus erythematosus POA  -Continue home regimen budesonide 9mg daily (see below for diabetes recommendations with Budesonide) and CellCept 500mg BID  -- Per daughter the patient self-discontinued Hydroxychloroquine. She can follow-up with prior prescriber to discuss need to continue     #Type 2 diabetes mellitus  -- The diabetes specialists were consulted during this hospitalization for her care. -- [facility] will need to assist with her getting a glucometer and insulin supplies on discharge from their facility  -- Discharge recommendations:   Has a follow-up visit with endocrinologist: Hannah Kuo NP. [facility] 11/17/22 at 1pm. Please fax clinicals to [phone] at discharge. On Discharge, please place an outpatient order for \"diabetes education\" (enter as REF20). This will trigger a referral for the Program for Diabetes Health which includes outpatient diabetes self management training with a certified diabetes educator. THIS WAS ORDERED ON DISCHARGE     Continue Metformin 500mg daily, Adjust amaryl to 1mg daily at breakfast. DISCHARGE MEDICATION RECONCILIATION ORDERED TO REFLECT     Will need basal insulin given with each budesonide dose. Glargine PEN 20 units SQ once daily. DISCHARGE MEDICATION RECONCILIATION ORDERED TO REFLECT     Pen Needle, Diabetic 32 Gauge x 5/32\" (1 box)     Patient to check BG before meals and bedtime. Please bring BG log to endocrinology apt. #History of GERD  -- Continue home Protonix daily    #Hypertension  #LE Edema  -- Continue home Spirinolactone 100mg daily  -- LE Edema likely due to mIVF (had been continued on NS 100cc/hr) and recommend compression stockings. #Blister  -- Fluid filled blister on mons pubis on 10/27/22. No surrounding erythema/discharge or concerns for infection. Recommend wound care at rehab to assess.           PENDING TEST RESULTS:   At the time of discharge the following test results are still pending: None    FOLLOW UP APPOINTMENTS:    -- Please follow-up with your Primary Care Doctor in 2-4 weeks for continued management of your  medical conditions  -- Please follow-up with your diabetes team, Jovani Romero NP, as scheduled on 11/17/22    ADDITIONAL CARE RECOMMENDATIONS:   -- Please take all medication as prescribed  -- You were discharged with Tramadol as needed for neuropathic pain. Do not increase the dose or frequency of this medication. Do not drive, operate machinery, or drink alcohol on this medication  -- You were also started on Lyrica 50mg daily (1 tablet) for the neuropathic pain. Please follow-up with your primary care doctor for titration of this medication  -- The diabetes team were consulted during this hospitalization and assisted with diabetes regimen. They recommend you discharge on Lantus 20mg daily (as Budesonide is a steroid and can cause elevation) and to use with Budesonide and to take Metformin 500mg daily and Amaryl 1mg daily  -- Please wear compression stockings for the lower extremity swelling    DIET: Diabetic Diet  Nutrition recommendations 10/26/22:  Nutrition Recommendations/Plan:   Continue 4 carb choice  Add Glucerna x 1 per day. TUBE FEEDING INSTRUCTIONS: N/A    OXYGEN / BiPAP SETTINGS: N/A    ACTIVITY: Activity as tolerated    WOUND CARE: None. Do not break the blister on the mons pubis. Monitor for surrounding redness or discharge    EQUIPMENT needed: Compression stockings      DISCHARGE MEDICATIONS:   See Medication Reconciliation Form      NOTIFY YOUR PHYSICIAN FOR ANY OF THE FOLLOWING:   Fever over 101 degrees for 24 hours. Chest pain, shortness of breath, fever, chills, nausea, vomiting, diarrhea, change in mentation, falling, weakness, bleeding. Severe pain or pain not relieved by medications. Or, any other signs or symptoms that you may have questions about.     DISPOSITION:    Home With:   OT  PT  HH  RN       SNF/Inpatient Rehab/LTAC ([facility])    Independent/assisted living Hospice    Other:       PATIENT CONDITION AT DISCHARGE: Stable and improved    Functional status    Poor     Deconditioned     Independent      Cognition     Lucid     Forgetful     Dementia      Catheters/lines (plus indication)    [place]     PICC     PEG     None, all lines to be removed prior to discharge     Code status     Full code     DNR      PHYSICAL EXAMINATION AT DISCHARGE:   Refer to Progress Note      CHRONIC MEDICAL DIAGNOSES:  -- T2DM  -- History of autoimmune hepatitis with liver cirrhosis POA  -- History of systemic lupus erythematosus POA  -- HTN    [facility]  Checked:   Yes      PROBLEM LIST Updated:  Yes      Signed:   Nilo Mendes PA-C  10/27/2022  10:55 AM

## 2022-10-27 NOTE — DIABETES MGMT
[facility]    CLINICAL NURSE SPECIALIST CONSULT     Initial Presentation   Yvan Durbin is a 69 y.o. female who presented to the ED 10/18/22 with a 2wk c/o progressive fatigue. She has become wheelchair-bound and requiring significant assistance for transfers and performing ADLs. In the ED, labs were significant for: WBC 15, , , Alk phos 169, ALT 38, Lac 4.8. UA 1000+ glucosuria, neg ketones. CXR with questionable pneumonia. Blood cx were obtained and she was admitted for medical management. HX:   Past Medical History:   Diagnosis Date    Autoimmune hepatitis ([facility] [address] 2021    GERD (gastroesophageal reflux disease)     Lupus (HCC)     lupus    PMB (postmenopausal bleeding) 08/2022    Uterine fibroid 07/03/2022        INITIAL DX:   Pneumonia [J18.9] Presumed GBS    Current Treatment     TX: Blood cx, IV antibitoics    Consulted by   Demarco Solis AvDO avinash   for advanced diabetes nursing assessment and care for:   [x] Survival skill education    Hospital Course   Clinical progress has been uncomplicated. 10/18: Admission  10/19: Seen by Neurology. GBS work-up   10/21: MRI negative for acute process. LP CSF with highly elevated protein, negative for meningitis and no elevated white count. Treating for GBS with IVIG x5 days  Diabetes History   Type 2 Diabetes  Ambulatory BG management provided by: Jayden Verduzco NP      Diabetes-related Medical History  Neurological complications  Peripheral neuropathy  Other associated conditions     Hyponatremia, Cirrhosis     Diabetes Medication History  Key Antihyperglycemic Medications               insulin glargine (LANTUS) 100 unit/mL injection Starting on 10/28/2022. Indications: type 2 diabetes mellitus    metFORMIN (GLUCOPHAGE) 500 mg tablet (Taking) Take 1 Tablet by mouth daily (with breakfast). glimepiride (AMARYL) 1 mg tablet (Taking) Take 1 Tablet by mouth every morning.         Patient tells me she takes 1 tablet of metformin and 2 tablets of amaryl daily. Daughter, Ania ([phone]) will confirm prescriptions and call me back to confirm. Diabetes self-management practices:   Eating pattern   On a low salt diet  [x] Breakfast Oatmeal  [x] Lunch  Ennis   [x] Dinner  Meatloaf, Air fried vegetables  [x] Bedtime Limited  [x] Snacks  Limited  [x] Beverages Water, coffee  Physical activity pattern   Weak   Now using a rollator  Monitoring pattern   Patient tells me she checks once daily: numbers 200-300s   Daughter says that she checks maybe once weekly Fasting is usually 100s and evening up to 300s     Taking medications pattern  [x] Consistent administration  [x] Affordable  Social determinants of health impacting diabetes self-management practices   Concerned that you need to know more about how to stay healthy with diabetes    Overall evaluation:    [x] Achieving A1c target with drug therapy & self-care practices      Started on oral budesonide 9mg with hepatology earlier this year by hepatologist for autoimmune hepatitis   Plan per last note: Once we have her on a stable high does of cellcept will start to taper the budesonide. And leave her on cellcept alone. Subjective   I am running out of here.      Objective   Physical exam  General  Normal weight white and frail female. Resting in bed. Appears exhausted. Conversant and cooperative  Neuro  Alert, oriented   Vital Signs Visit Vitals  /74   Pulse 88   Temp 98.2 °F (36.8 °C)   Resp 18   Ht 5' (1.524 m)   Wt 52.2 kg (115 lb)   SpO2 100%   BMI 22.46 kg/m²     Skin  Warm and dry. No acanthosis noted along neckline. Heart   Regular rate and rhythm. No murmurs, rubs or gallops  Lungs  Clear to auscultation without rales or rhonchi  Extremities No foot wounds      Laboratory  No results for input(s): GLU, AGAP, TRIGL, WBC, CREA, GFRNA, AST, ALT in the last 72 hours.     No lab exists for component: A1C      Factors impacting BG management  Factor Dose Comments   Nutrition:  Standard meals     60 grams/meal      Infection Pneumonia  IV antibiotics    Alcoholic hepatitis  Cirrhosis       Blood glucose pattern      Significant diabetes-related events over the past 24-72 hours  A1C 8.2%  Fasting B  Pre-prandial B-247  Basal: 20 units Lantus daily  Bolus: 6 units with meals  Correction: 9 units in the last 24h  On cellcept for liver inflammation   9mg budesonide daily for autoimmune hepatitis   IVIG (Today is )    Assessment and Plan   Nursing Diagnosis Risk for unstable blood glucose pattern   Nursing Intervention Domain 5259 Decision-making Support   Nursing Interventions Examined current inpatient diabetes/blood glucose control   Explored factors facilitating and impeding inpatient management  Explored corrective strategies with patient and responsible inpatient provider   Informed patient of rational for insulin strategy while hospitalized       Evaluation   Humble Fuentes is a 69year old female, with Type 2 Diabetes and cirrhosis s/t autoimmune hepatitis, who is admitted with sepsis s/t community acquired pneumonia. A1C on admission was 8.2% (improved from 9.9%) with recent adjustments in oral antihyperglycemic agents a few weeks ago with her PCP. She is prescribed 9mg budesonide- starting earlier this year for treatment of autoimmune hepatitis and elevations in S3Y most certainly related to steroid use. She does endorse stable fasting BG values PTA but does see hyperglycemia to the 200-300s in the evenings prior to hospital admission. Factors impacting glucose at this time are infection/PNA, budesonide, cirrhosis and elevated A1C. She continues to have steroid induced hyperglycemia s/t budesonide despite high dose glargine and mealtime humalog. Doses have been adjusted to target inpatient BG goal of 140-180mg/dl. Recommendations   POC glucose ACHS    Consistent carbohydrate diet (60 grams CHO/meal)    3. Continue the subcutaneous Insulin Order set (2276): Insulin Dosing Specific recommendation   Basal                                       (Based on weight, BMI & GFR) 20 units Lantus daily        Nutritional                                      (Based on CHO/dextrose load) Resistant sensitivity: 6 units Humalog/meal today. Corrective                                       (Useful in adjusting insulin dosing) Resistant sensitivity [facility]         Discharge Recommendations   Has a follow-up visit with endocrinologist: Drew Rdoriguez NP. [facility] 11/17/22 at 1pm. Please fax clinicals to [phone] at discharge. On Discharge, please place an outpatient order for \"diabetes education\" (enter as REF20). This will trigger a referral for the Program for Diabetes Health which includes outpatient diabetes self management training with a certified diabetes educator. Continue Metformin 500mg daily, Adjust amaryl to 1mg daily at breakfast.    Will need basal insulin given with each budesonide dose. Glargine PEN 20 units SQ once daily. Pen Needle, Diabetic 32 Gauge x 5/32\" (1 box)    Patient to check BG before meals and bedtime. Please bring BG log to endocrinology apt. Billing Code(s)   [x] 04662  Before making these care recommendations, I personally reviewed the hospitalization record, including notes, laboratory & diagnostic data and current medications, and examined the patient at the bedside (circumstances permitting) before making care recommendations. More than fifty (50) percent of the time was spent in patient counseling and/or care coordination.   Total minutes: 21      ALBERTO Hernandez BC-ADM  Board Certified Advanced Diabetes Manager  Clinical Nurse Specialist  Program for Diabetes Health  Access via [employer]

## 2022-10-27 NOTE — PROGRESS NOTES
RUR: 12% Low     EYAD: [facility] has accepted. [facility] scheduled for today, Thursday, 10/27 @ 11:30AM. Follow-up with PCP/specialist.      Primary Contact: Daughter, Sarah Villalba, [phone] or Jocelyn Ashley, [phone]    9:45AM - CM spoke with [facility] liaison (p: [phone]) to confirm today's discharge. No assigned room until patient arrives at [facility]. Nursing to call report to to [phone]. CM portion of EMTALA completed. Discharge packet, ambulance form and EMTALA placed on patient's hard chart. Nursing made aware. Patient and daughter remain in agreement with discharge. No further CM needs.      DARSHAN Dietrich   [phone]

## 2022-10-27 NOTE — PROGRESS NOTES
TRANSFER - OUT REPORT:    Verbal report given to Jodi Cobb RN (name) on Charmayne Cosier  being transferred to [facility] [address] (unit) for routine progression of care       Report consisted of patients Situation, Background, Assessment and   Recommendations(SBAR). Information from the following report(s) SBAR and Kardex was reviewed with the receiving nurse. Lines:       Opportunity for questions and clarification was provided.       Patient transported with:   [email or web address]

## 2022-10-27 NOTE — PROGRESS NOTES
Wound Care Follow Up: 10/27/22    Patient getting ready to transfer to [address] [place] [address]. Sacral foam C/D/I. Not removed. Heels w/out erythema.

## 2022-10-27 NOTE — PROGRESS NOTES
Bedside and Verbal shift change report given to [address] [place] [address]  (oncoming nurse) by Siena Edwards  (offgoing nurse). Report included the following information SBAR.

## 2022-10-27 NOTE — PROGRESS NOTES
Neurology Progress Note     NAME: Nicole Hernadez   :  1945   MRN:  [id number]   DATE:  10/27/2022    Assessment:     Active Problems:    CAP (community acquired pneumonia) (10/18/2022)      Guillain Barré syndrome ([facility] (10/27/2022)    Pt is a 77yo right-handed female with DM, autoimmune hepatitis with cirrhosis, SLE on chronic immunosuppression with Cellcept, admitted 10/18/22 due to severe weakness in LE preventing her from walking. She was found on admission to a leukocytosis and lactic acidosis, CXR with minimal bibasilar opacities concerning for PNA, started on Zithromax and Rocephin. She feels her symptoms worsened after receiving the flu vaccine 4 weeks ago. She underwent MRI L-spine w/wo contrast 10/19/22 without mention of nerve root enhancement, does have degenerative changes with mod to severe central stenosis. LP 10/20/22 with cytoalbuminologic dissociation with Pro 107. Exam today: Patient has no SOB. BUE 5/5, BLE bilat HF 4/5, KF/E 4-/5, DF 4+/5,/PF 4/5, better inversion, bilat 1+ patellar reflexes  IgA level 454  Plan:   **Presumed GBS  - Suspect GBS given progressive LE weakness after flu vaccine with absent LE reflexes and elevated CSF protein. - MRI L/S-spine showed no pathology as cause  - Completed 5 day course IVIG and strength continues to improve  - start Lyrica for BLE neuropathic pain  - Discharge today to IPR    The patient does not need to follow-up in the Neurology clinic after discharge. Subjective:   Patient is ready for discharge and daughter is at the bedside.     Objective:   Chart reviewed since last seen    Current Facility-Administered Medications   Medication Dose Route Frequency    insulin lispro (HUMALOG) injection 6 Units  6 Units SubCUTAneous TID WITH MEALS    insulin glargine (LANTUS) injection 20 Units  20 Units SubCUTAneous DAILY    dextrose 10 % infusion 0-250 mL  0-250 mL IntraVENous PRN    melatonin tablet 6 mg  6 mg Oral QHS PRN    glucose chewable tablet 16 g  4 Tablet Oral PRN    glucagon (GLUCAGEN) injection 1 mg  1 mg IntraMUSCular PRN    insulin lispro (HUMALOG) injection   SubCUTAneous AC&HS    traMADoL (ULTRAM) tablet 50 mg  50 mg Oral Q6H PRN    sodium chloride (NS) flush 5-40 mL  5-40 mL IntraVENous Q8H    sodium chloride (NS) flush 5-40 mL  5-40 mL IntraVENous PRN    acetaminophen (TYLENOL) tablet 650 mg  650 mg Oral Q6H PRN    Or    acetaminophen (TYLENOL) suppository 650 mg  650 mg Rectal Q6H PRN    polyethylene glycol (MIRALAX) packet 17 g  17 g Oral DAILY PRN    ondansetron (ZOFRAN ODT) tablet 4 mg  4 mg Oral Q8H PRN    Or    ondansetron (ZOFRAN) injection 4 mg  4 mg IntraVENous Q6H PRN    budesonide (ENTOCORT EC) capsule 9 mg  9 mg Oral DAILY    mycophenolate mofetil (CELLCEPT) capsule 500 mg  500 mg Oral ACB&D    pantoprazole (PROTONIX) tablet 40 mg  40 mg Oral DAILY    spironolactone (ALDACTONE) tablet 100 mg  100 mg Oral DAILY       Visit Vitals  /74   Pulse 88   Temp 98.2 °F (36.8 °C)   Resp 18   Ht 5' (1.524 m)   Wt 52.2 kg (115 lb)   SpO2 100%   BMI 22.46 kg/m²     Temp (24hrs), Av.1 °F (36.7 °C), Min:97.6 °F (36.4 °C), Max:98.4 °F (36.9 °C)      No intake/output data recorded. 10/25 1901 - 10/27 0700  In: 800 [P.O.:800]  Out: 850 [Urine:850]      Physical Exam:  General: Frail patient, well nourished  Cardiac: Regular rate and rhythm with no murmurs. Resp: Mild dyspnea during history/exam  Extremities: 2+ Radial pulses, no cyanosis or edema    Neurological Exam:  Mental Status: Oriented to time, place and person. Speech and language intact. Attention and fund of knowledge appropriate. Normal recent and remote memory. Cranial Nerves:   VFF, EOMI, no nystagmus, no ptosis. Facial movement is symmetric. Hearing is intact. Motor:  5/5 strength in upper extremities.  Bilat HF 4/5, bilateral PF 4/5, DF 4+/5. Better inversion. Normal bulk and tone. Reflexes:   Deep tendon reflexes 2+ and symmetric in UE, BLE 1+. Toes downgoing. Sensory:   Intact to LT and PP   Gait:  Not assessed   Cerebellar:  N/A         Lab Review   Recent Results (from the past 24 hour(s))   GLUCOSE, POC    Collection Time: 10/26/22 12:08 PM   Result Value Ref Range    Glucose (POC) 158 (H) 65 - 117 mg/dL    Performed by Jorge Yates  PCT    GLUCOSE, POC    Collection Time: 10/26/22  4:50 PM   Result Value Ref Range    Glucose (POC) 176 (H) 65 - 117 mg/dL    Performed by Peña Saeed    GLUCOSE, POC    Collection Time: 10/26/22  9:57 PM   Result Value Ref Range    Glucose (POC) 207 (H) 65 - 117 mg/dL    Performed by Rita Palafox, POC    Collection Time: 10/27/22  6:19 AM   Result Value Ref Range    Glucose (POC) 81 65 - 117 mg/dL    Performed by [id number]    Collection Time: 10/27/22  8:13 AM   Result Value Ref Range    Magnesium 1.8 1.6 - 2.4 mg/dL   PHOSPHORUS    Collection Time: 10/27/22  8:13 AM   Result Value Ref Range    Phosphorus 2.9 2.6 - 4.7 MG/DL       Additional comments:  I have reviewed the patient's new clinical lab test results. There are no new images to review.     Care Plan discussed with:  Patient x   Family    RN    Care Manager    Consultant/Specialist:  AMADO Parrish

## 2022-10-28 NOTE — TELEPHONE ENCOUNTER
Daughter advised per Dr. Twan Sanchez it is ok to keep patient's appt for 12/22/22. She does not need to be seen sooner.

## 2022-11-10 NOTE — TELEPHONE ENCOUNTER
Called Gagan Neil from [facility]. She stated that for the past days she has been declining again. Reports extremity weakness along with leg edema. Reports 2 days ago they gave her prednisone 50mg daily, but reports it has not been helping. Reports the last time this happened in October where she received IVIG for about 5 days on 10/21/22. She wanted some advice on whether she should go back to the hospital to get IVIG due to her progressing weakness. Spoke to one of our providers in our office, and he stated that since she is experiencing this again- to go to the ER to get it done. Suggested she get it done in the ER instead of outpatient as outpatient takes longer for the patient to be seen for it. She verbalizes understanding.

## 2022-11-10 NOTE — TELEPHONE ENCOUNTER
She's callinig stating the patient got a flu shot and started to decline and they gave her IVIG and thinking she had Guillain Bowie Syndrome. She is in the rehab facility and not doing well. Please call to discuss her current medical condition.

## 2023-02-18 NOTE — DISCHARGE SUMMARY
Discharge Summary       PATIENT ID: Jhonathan Rios  MRN: [id number]   YOB: 1945    DATE OF ADMISSION: 10/18/2022  3:59 PM    DATE OF DISCHARGE: 10/27/22   PRIMARY CARE PROVIDER: Ani Edwards NP     ATTENDING PHYSICIAN: Dr. Marisol Astorga  DISCHARGING PROVIDER: Nj Adams PA-C    To contact this individual call [phone] and ask the  to page. If unavailable ask to be transferred the Adult Hospitalist Department. CONSULTATIONS: IP CONSULT TO NEUROLOGY  IP CONSULT TO NEPHROLOGY    PROCEDURES/SURGERIES: * No surgery found *    DISCHARGE DIAGNOSES:   -- Suspected Guillain-Houston Syndrome  -- Community Acquired Pneumonia    ADMITTING DIAGNOSES & HOSPITAL COURSE:   Jhonathan Rios is a 69 y.o. female with T2DM, autoimmune hepatitis with cirrhosis, SLE on chronic immunosuppression with Cellcept, admitted 10/18/22 due to severe weakness in LE preventing her from walking. On admission she was found to have a leukocytosis and lactic acidosis, CXR with minimal bibasilar opacities concerning for PNA, started on Zithromax and Rocephin for which she completed a 5 day course during the admission. Of note, her symptoms of LE weakness have been progressive since receiving the flu vaccine ~ 4 weeks PTA. She underwent MRI L-spine w/wo contrast 10/19/22 without mention of nerve root enhancement, does have degenerative changes with mod to severe central stenosis. LP 10/20/22 with cytoalbuminologic dissociation with Pro 107. She was diagnosed with suspected Guillain-Houston Syndrome by our Neurology team due to progressive LE weakness after flu vaccine with absent LE reflexes and elevated CSF protein with MRI L/S spine revealing no pathology. She was started on IVIG 0.4g/kg daily x 5 days and completed the course on 10/26/22. She worked with PT/OT during hospitalization and after receiving the IVIG her LE weakness was improving.  She was medically cleared for discharge to [facility] on 10/27/22. I reviewed her case with neurology and that she developed new neuropathic pain in bilateral thighs and recommended initiating on Lyrica daily and to further uptitrate as an outpatient. Prior to discharge I discussed with her daughter, Carson Bertrand, who was coming to the hospital and in agreement with plan. For discharge pain medications she was given a paper script for Tramadol 50mg q6h PRN with #12# tablets and 0 refills (had not required dosing for multiple days prior to discharge) and Lyrica 50mg daily for neuropathic pain with #30# tablets and 0 refills (paperscript). I reviewed her home medications with her daughter NEY and the difference between our list is that she also takes Metformin 500mg daily and no longer takes the Hydroxychloroquine as the patient self discontinued and never restarted. [facility] Attending MD, Dr. Isaac Avila, was notified and in agreement with plan. DISCHARGE DIAGNOSES / PLAN:      #Suspected Guillain-Barré syndrome POA  #Progressive lower extremity weakness due to above  #Absent LE reflexes  Due To above  -MRI lumbar spine with L2-L3 disc extrusion with moderate spinal and left foraminal stenosis. L4-L5 severe spinal stenosis  -Lumbar puncture 10/20/2022 with elevated protein ()  -Assessed by neurology and started on IVIG day 5/5 and completed course on 10/27/22  -PT OT recommending inpatient rehab and ultimately discharged on 10/27/22 to [facility]  -- Neuropathic pain: Tramadol 50mg q6h PRN for pain (#12 tablets and 0 refills, paper script) and Lyrica 50mg daily (#30# tablets and 0 refills, paper script). [facility].  Safety instructions with opioids completed  -- Per Neurology does not require follow-up in their clinic     #Community-acquired pneumonia POA resolved  -Patient completed course of antibiotic (CTX and Zithromycin)  x 5 days during hospitalization     #History of autoimmune hepatitis with liver cirrhosis POA  #History of systemic lupus erythematosus POA  -Continue home regimen budesonide 9mg daily (see below for diabetes recommendations with Budesonide) and CellCept 500mg BID  -- Per daughter the patient self-discontinued Hydroxychloroquine. She can follow-up with prior prescriber to discuss need to continue     #Type 2 diabetes mellitus  -- The diabetes specialists were consulted during this hospitalization for her care. -- [facility] will need to assist with her getting a glucometer and insulin supplies on discharge from their facility  -- Discharge recommendations:   Has a follow-up visit with endocrinologist: Indira Wray NP. [facility] 11/17/22 at 1pm. Please fax clinicals to [phone] at discharge. On Discharge, please place an outpatient order for \"diabetes education\" (enter as REF20). This will trigger a referral for the Program for Diabetes Health which includes outpatient diabetes self management training with a certified diabetes educator. THIS WAS ORDERED ON DISCHARGE     Continue Metformin 500mg daily, Adjust amaryl to 1mg daily at breakfast. DISCHARGE MEDICATION RECONCILIATION ORDERED TO REFLECT     Will need basal insulin given with each budesonide dose. Glargine PEN 20 units SQ once daily. DISCHARGE MEDICATION RECONCILIATION ORDERED TO REFLECT     Pen Needle, Diabetic 32 Gauge x 5/32\" (1 box)     Patient to check BG before meals and bedtime. Please bring BG log to endocrinology apt. #History of GERD  -- Continue home Protonix daily     #Hypertension  #LE Edema  -- Continue home Spirinolactone 100mg daily  -- LE Edema likely due to mIVF (had been continued on NS 100cc/hr) and recommend compression stockings. #Blister  -- Fluid filled blister on mons pubis on 10/27/22. No surrounding erythema/discharge or concerns for infection. Recommend wound care at rehab to assess.    BMI: Body mass index is 22.46 kg/m². . This patient: Has a BMI within normal limits. PENDING TEST RESULTS:   At the time of discharge the following test results are still pending: None     PENDING TEST RESULTS:   At the time of discharge the following test results are still pending: None     FOLLOW UP APPOINTMENTS:    -- Please follow-up with your Primary Care Doctor in 2-4 weeks for continued management of your  medical conditions  -- Please follow-up with your diabetes team, Reymundo Lorenzana NP, as scheduled on 11/17/22     ADDITIONAL CARE RECOMMENDATIONS:   -- Please take all medication as prescribed  -- You were discharged with Tramadol as needed for neuropathic pain. Do not increase the dose or frequency of this medication. Do not drive, operate machinery, or drink alcohol on this medication  -- You were also started on Lyrica 50mg daily (1 tablet) for the neuropathic pain. Please follow-up with your primary care doctor for titration of this medication  -- The diabetes team were consulted during this hospitalization and assisted with diabetes regimen. They recommend you discharge on Lantus 20mg daily (as Budesonide is a steroid and can cause elevation) and to use with Budesonide and to take Metformin 500mg daily and Amaryl 1mg daily  -- Please wear compression stockings for the lower extremity swelling     DIET: Diabetic Diet  Nutrition recommendations 10/26/22:  Nutrition Recommendations/Plan:   Continue 4 carb choice  Add Glucerna x 1 per day. TUBE FEEDING INSTRUCTIONS: N/A     OXYGEN / BiPAP SETTINGS: N/A     ACTIVITY: Activity as tolerated     WOUND CARE: None. Do not break the blister on the mons pubis. Monitor for surrounding redness or discharge     EQUIPMENT needed: Compression stockings    NOTIFY YOUR PHYSICIAN FOR ANY OF THE FOLLOWING:   Fever over 101 degrees for 24 hours. Chest pain, shortness of breath, fever, chills, nausea, vomiting, diarrhea, change in mentation, falling, weakness, bleeding.  Severe pain or pain not relieved by medications, as well as any other signs or symptoms that you may have questions about. FOLLOW UP APPOINTMENTS:    Follow-up Information       Follow up With Specialties Details Why Contact Sina Senior NP  Follow up on 11/17/2022 First visit 11/17 at 1pm  [email or web address] Please print and fill out out prior to first visit Ellie, [address] [facility]  [phone]    Jorge Vásquez NP Nurse Practitioner Call Please call to make a follow-up appointment with your Primary Care Doctor in 2-4 weeks for ongoing management of your medical conditions [address] [facility]  [phone]             DISCHARGE MEDICATIONS:  Current Discharge Medication List        START taking these medications    Details   insulin glargine (LANTUS) 100 unit/mL injection Indications: type 2 diabetes mellitus  Qty: 1 mL  Start date: 10/28/2022      traMADoL (ULTRAM) 50 mg tablet Take 1 Tablet by mouth every six (6) hours as needed for Pain for up to 3 days. Max Daily Amount: 200 mg. Indications: disorder characterized by stiff, tender & painful muscles, neuropathic pain  Qty: 12 Tablet, Refills: 0  Start date: 10/27/2022, End date: 10/30/2022    Associated Diagnoses: GBS (Guillain [place] syndrome) ([facility])      pregabalin (Lyrica) 50 mg capsule Take 1 Capsule by mouth daily. Max Daily Amount: 50 mg. Indications: neuropathic pain  Qty: 30 Capsule, Refills: 0  Start date: 10/27/2022    Associated Diagnoses: GBS (Guillain [place] syndrome) ([facility]           CONTINUE these medications which have CHANGED    Details   glimepiride (AMARYL) 1 mg tablet Take 1 Tablet by mouth every morning. Start date: 10/27/2022           CONTINUE these medications which have NOT CHANGED    Details   metFORMIN (GLUCOPHAGE) 500 mg tablet Take 1 Tablet by mouth daily (with breakfast).   Start date: 10/27/2022      budesonide (ENTOCORT EC) 3 mg capsule TAKE 3 CAP BY MOUTH DAILY.  Qty: 90 Capsule, Refills: 3    Associated Diagnoses: Cirrhosis of liver without ascites, unspecified hepatic cirrhosis type (HCC)      spironolactone (Aldactone) 100 mg tablet Take 1 Tablet by mouth daily. Qty: 90 Tablet, Refills: 3    Associated Diagnoses: Cirrhosis of liver without ascites, unspecified hepatic cirrhosis type (HCC)      mycophenolate (CellCept) 500 mg tablet Take 1 Tablet by mouth two (2) times a day. Qty: 180 Tablet, Refills: 3    Associated Diagnoses: Cirrhosis of liver without ascites, unspecified hepatic cirrhosis type (HCC)      pantoprazole (Protonix) 40 mg tablet Take 1 Tablet by mouth daily. Qty: 30 Tablet, Refills: 0           STOP taking these medications       hydrOXYchloroQUINE (PLAQUENIL) 200 mg tablet Comments:   Reason for Stopping:               DISPOSITION:    Home With:   OT  PT  HH  RN       Long term SNF/Inpatient Rehab ([facility])    Independent/assisted living    Hospice    Other:       PATIENT CONDITION AT DISCHARGE: Stable and improved    Functional status    Poor     Deconditioned     Independent      Cognition     Lucid     Forgetful     Dementia      Catheters/lines (plus indication)    [place]     PICC     PEG     None, all lines to be removed prior to discharge     Code status     Full code     DNR      PHYSICAL EXAMINATION AT DISCHARGE:  General:          Alert, cooperative, no distress, appears stated age. HEENT:           Atraumatic, anicteric sclerae, pink conjunctivae                          No oral ulcers, mucosa moist, throat clear  Neck:               Supple, symmetrical  Lungs:             Clear to auscultation bilaterally. No Wheezing or Rhonchi. No rales. Heart:              Regular  rhythm,  No  murmur. 2+ pitting edema in BLE and pedal edema. Warm and well perfused  Abdomen:        Soft, non-tender. Not distended. Bowel sounds normal  Extremities:     No cyanosis. Skin:                Not pale. Not Jaundiced  No rashes. ~3cm fluid filled blister on mons pubis with no surrounding erythema, warmth, or discharge  Psych:             Not anxious or agitated. Neurologic:      Alert, moves all extremities, answers questions appropriately and responds to commands. Motor Exam: RHF 5, R KE 4, R DF/PF 5, L HF 5, L KE 4, L DF/PF 5.  Sensation intact to light touch in all dermatomes      CHRONIC MEDICAL DIAGNOSES:  Problem List as of 10/27/2022 Date Reviewed: 10/16/2022            Codes Class Noted - Resolved    Guillain Barré syndrome ([facility] ICD-10-CM: G61.0  ICD-9-CM: 357.0  10/27/2022 - Present        CAP (community acquired pneumonia) ICD-10-CM: J18.9  ICD-9-CM: 486  10/18/2022 - Present        Ascites ICD-10-CM: R18.8  ICD-9-CM: 789.59  10/16/2022 - Present        Carrington's esophagus ICD-10-CM: K22.70  ICD-9-CM: 530.85  10/16/2022 - Present        Abnormal uterine bleeding (AUB) ICD-10-CM: N93.9  ICD-9-CM: 626.9  9/7/2022 - Present        Recent unexplained weight loss ICD-10-CM: R63.4  ICD-9-CM: 783.21  9/7/2022 - Present        Uterine fibroid ICD-10-CM: D25.9  ICD-9-CM: 218.9  7/3/2022 - Present        Cirrhosis ([facility] ICD-10-CM: K74.60  ICD-9-CM: 571.5  6/24/2022 - Present        Type 2 diabetes mellitus (HCC) ICD-10-CM: E11.9  ICD-9-CM: 250.00  Unknown - Present        GERD (gastroesophageal reflux disease) ICD-10-CM: K21.9  ICD-9-CM: 530.81  Unknown - Present        Lupus ([facility] ICD-10-CM: M32.9  ICD-9-CM: 710.0  Unknown - Present        History of cholecystectomy ICD-10-CM: Z90.49  ICD-9-CM: V45.79  Unknown - Present        H/O lumbosacral spine surgery ICD-10-CM: Z98.890  ICD-9-CM: V15.29  Unknown - Present        Autoimmune hepatitis ([facility] ICD-10-CM: K75.4  ICD-9-CM: 571.42  2021 - Present           Greater than 45 minutes were spent with the patient on counseling and coordination of care    Signed:   Nilo Mendes PA-C  10/27/2022  11:15 AM Cimetidine Counseling:  I discussed with the patient the risks of Cimetidine including but not limited to gynecomastia, headache, diarrhea, nausea, drowsiness, arrhythmias, pancreatitis, skin rashes, psychosis, bone marrow suppression and kidney toxicity.

## (undated) DEVICE — 1200 GUARD II KIT W/5MM TUBE W/O VAC TUBE: Brand: GUARDIAN

## (undated) DEVICE — Device

## (undated) DEVICE — CANNULA NSL O2 AD 7 FT END-TIDAL CARBON DIOX VENTFLO

## (undated) DEVICE — BITEBLOCK ENDOSCP 60FR MAXI WHT POLYETH STURDY W/ VELC WVN

## (undated) DEVICE — SYR 5ML 1/5 GRAD LL NSAF LF --

## (undated) DEVICE — BASIN EMSIS 16OZ GRAPHITE PLAS KID SHP MOLD GRAD FOR ORAL

## (undated) DEVICE — CONTAINER SPEC 20 ML LID NEUT BUFF FORMALIN 10 % POLYPR STS

## (undated) DEVICE — ELECTRODE,RADIOTRANSLUCENT,FOAM,3PK: Brand: MEDLINE

## (undated) DEVICE — BAG SPEC BIOHZRD 10 X 10 IN --

## (undated) DEVICE — KIT COLON W/ 1.1OZ LUB AND 2 END

## (undated) DEVICE — FORCEPS BX L240CM JAW DIA2.8MM L CAP W/ NDL MIC MESH TOOTH

## (undated) DEVICE — SOLIDIFIER MEDC 1200ML -- CONVERT TO 356117

## (undated) DEVICE — CATH IV AUTOGRD BC BLU 22GA 25 -- INSYTE

## (undated) DEVICE — SIMPLICITY FLUFF UNDERPAD 23X36, MODERATE: Brand: SIMPLICITY

## (undated) DEVICE — BAG BELONG PT PERS CLEAR HANDL

## (undated) DEVICE — ENDO KIT 1: Brand: MEDLINE INDUSTRIES, INC.

## (undated) DEVICE — CANNULA CUSH AD W/ 14FT TBG

## (undated) DEVICE — SET ADMIN 16ML TBNG L100IN 2 Y INJ SITE IV PIGGY BK DISP

## (undated) DEVICE — (D)SENSOR RMFG 02 PULS OXMTR -- DISC BY MFR USE ITEM 133445

## (undated) DEVICE — MOUTHPIECE ENDOSCP 20X27MM --

## (undated) DEVICE — 3M™ CUROS™ DISINFECTING CAP FOR NEEDLELESS CONNECTORS 270/CARTON 20 CARTONS/CASE CFF1-270: Brand: CUROS™

## (undated) DEVICE — SYR 3ML LL TIP 1/10ML GRAD --

## (undated) DEVICE — CUFF RMFG BP INF SZ 11 DISP -- LAWSON OEM ITEM 238915

## (undated) DEVICE — SET GRAV CK VLV NEEDLESS ST 3 GANGED 4WAY STPCOCK HI FLO 10